# Patient Record
Sex: FEMALE | Race: BLACK OR AFRICAN AMERICAN | Employment: PART TIME | URBAN - METROPOLITAN AREA
[De-identification: names, ages, dates, MRNs, and addresses within clinical notes are randomized per-mention and may not be internally consistent; named-entity substitution may affect disease eponyms.]

---

## 2017-01-03 ENCOUNTER — OFFICE VISIT (OUTPATIENT)
Dept: OBGYN CLINIC | Age: 72
End: 2017-01-03

## 2017-01-03 VITALS
HEIGHT: 65 IN | WEIGHT: 179.4 LBS | BODY MASS INDEX: 29.89 KG/M2 | HEART RATE: 61 BPM | SYSTOLIC BLOOD PRESSURE: 103 MMHG | DIASTOLIC BLOOD PRESSURE: 59 MMHG | RESPIRATION RATE: 16 BRPM | TEMPERATURE: 98.9 F

## 2017-01-03 DIAGNOSIS — B37.2 YEAST INFECTION OF THE SKIN: ICD-10-CM

## 2017-01-03 DIAGNOSIS — N89.8 VAGINAL DISCHARGE: ICD-10-CM

## 2017-01-03 DIAGNOSIS — Z01.419 WELL WOMAN EXAM WITH ROUTINE GYNECOLOGICAL EXAM: Primary | ICD-10-CM

## 2017-01-03 RX ORDER — NYSTATIN AND TRIAMCINOLONE ACETONIDE 100000; 1 [USP'U]/G; MG/G
CREAM TOPICAL 2 TIMES DAILY
Qty: 30 G | Refills: 3 | Status: ON HOLD | OUTPATIENT
Start: 2017-01-03 | End: 2022-08-26

## 2017-01-03 RX ORDER — NYSTATIN AND TRIAMCINOLONE ACETONIDE 100000; 1 [USP'U]/G; MG/G
CREAM TOPICAL 2 TIMES DAILY
Qty: 30 G | Refills: 3 | Status: SHIPPED | OUTPATIENT
Start: 2017-01-03 | End: 2017-01-03 | Stop reason: SDUPTHER

## 2017-01-03 NOTE — PROGRESS NOTES
NEW PATIENT EXAM  Stefani/Post Menopause    SUBJECTIVE: Kaleb Shukla is a 80 y.o. female who presents for annual well woman exam. Complaints of perineal irritation and itching. Lots of scratching. No LMP recorded. Patient has had a hysterectomy. GYN History  Menopause:  49  Post menopausal bleeding:   NO  Sexually active: NO  Contraception:  none  Sexually transmitted diseases/infections: NO    Last pap: >10 years  The prior Pap result: Normal.      Past Medical History   Diagnosis Date    Arthritis 2014    CAD (coronary artery disease)     Chronic obstructive pulmonary disease ([facility] [address] 2016    Hypercholesterolemia     Hypertension      Past Surgical History   Procedure Laterality Date    Hx hysterectomy  1983     OB History     No data available        Family History   Problem Relation Age of Onset    Diabetes Mother     Lung Disease Father      Lung cancer    Diabetes Maternal Grandmother     Heart Disease Maternal Grandfather     Arthritis-rheumatoid Paternal Grandmother     Cancer Paternal Grandfather      Social History     Social History    Marital status: SINGLE     Spouse name: N/A    Number of children: N/A    Years of education: N/A     Occupational History    Not on file. Social History Main Topics    Smoking status: Current Every Day Smoker     Packs/day: 6.00     Types: Cigarettes    Smokeless tobacco: Not on file      Comment: patient states she has patches    Alcohol use No    Drug use: No    Sexual activity: No     Other Topics Concern    Not on file     Social History Narrative       Current Outpatient Prescriptions   Medication Sig Dispense Refill    nystatin-triamcinolone (MYCOLOG II) topical cream Apply  to affected area two (2) times a day. 30 g 3    magnesium oxide (MAG-OX) 400 mg tablet TAKE 1 TABLET EVERY DAY  6    traMADol (ULTRAM) 50 mg tablet Take 50 mg by mouth two (2) times daily as needed for Pain.       linaclotide (LINZESS) 145 mcg cap capsule Take  by mouth Daily (before breakfast).  cholecalciferol, vitamin D3, (VITAMIN D3) 2,000 unit tab Take  by mouth.  FOLIC ACID/MV,FE,MIN (ONE DAILY FOR WOMEN PO) Take  by mouth daily.  omeprazole (PRILOSEC) 20 mg capsule Take 20 mg by mouth daily.  cyanocobalamin (VITAMIN B-12) 100 mcg tablet Take 100 mcg by mouth daily.  atenolol (TENORMIN) 25 mg tablet Take 25 mg by mouth daily.  atorvastatin (LIPITOR) 20 mg tablet Take 1 tablet by mouth nightly. 30 tablet 12    amlodipine (NORVASC) 5 mg tablet Take 1 tablet by mouth daily. 30 tablet 12    lisinopril (PRINIVIL, ZESTRIL) 10 mg tablet Take 1 tablet by mouth daily. 30 tablet 12    nitroglycerin (NITROSTAT) 0.4 mg SL tablet 1 tablet by SubLINGual route every five (5) minutes as needed for Chest Pain. 25 tablet 12    gabapentin (NEURONTIN) 600 mg tablet Take 600 mg by mouth four (4) times daily as needed.  diclofenac EC (VOLTAREN) 75 mg EC tablet Take 75 mg by mouth two (2) times a day.  aspirin delayed-release 81 mg tablet Take  by mouth daily. Review of Systems:   Complete review of systems reviewed from social and history data forms. Pertinent positives in HPI. Objective:     Visit Vitals    /59 (BP 1 Location: Right arm, BP Patient Position: Sitting)    Pulse 61    Temp 98.9 °F (37.2 °C) (Oral)    Resp 16    Ht 5' 5\" (1.651 m)    Wt 179 lb 6.4 oz (81.4 kg)    BMI 29.85 kg/m2       General:  alert, cooperative, no distress, appears stated age   Skin:  Normal.   Lymph Nodes:  Cervical, supraclavicular, and axillary nodes normal.   Breast Exam: normal appearance, no masses or tenderness    Lungs:  clear to auscultation bilaterally   Heart:  regular rate and rhythm, S1, S2 normal, no murmur, click, rub or gallop   Abdomen: soft, non-tender.  No masses,  no organomegaly   Back:  Costovertebral angle tenderness absent   Genitourinary: BUS normal. Introitus normal. Normal appearing vaginal epithelium, Vaginal discharge described as normal and physiologic.,  Adnexa normal in size left and right without tenderness. Extremities:  extremities normal, atraumatic, no cyanosis or edema     Neurologic:  negative   Psychiatric:  non focal     ASSESSMENT:      ICD-10-CM ICD-9-CM    1. Well woman exam with routine gynecological exam Z01.419 V72.31    2. Vaginal discharge N89.8 623.5    3. Yeast infection of the skin B37.2 112.3 nystatin-triamcinolone (MYCOLOG II) topical cream      DISCONTINUED: nystatin-triamcinolone (MYCOLOG II) topical cream     Follow-up Disposition:  Return if symptoms worsen or fail to improve. Today's care was reviewed and discussed with the patient. She expresses understanding and approval of today's plan.     Reggie Gillespie MD

## 2017-01-03 NOTE — PROGRESS NOTES
Chief Complaint   Patient presents with    New Patient     Patient complains of vaginal itching with odorous brownish discharge for 4-5 months.     -Note written by Ian Pa RN

## 2017-01-03 NOTE — MR AVS SNAPSHOT
Visit Information Date & Time Provider Department Dept. Phone Encounter #  
 1/3/2017  2:30 PM Sky Núñez [phone] [id number] Upcoming Health Maintenance Date Due Hepatitis C Screening 1945 FOBT Q 1 YEAR AGE 50-75 7/23/1996 ZOSTER VACCINE AGE 60> 7/23/2006 OSTEOPOROSIS SCREENING (DEXA) 7/23/2011 BREAST CANCER SCRN MAMMOGRAM 10/11/2018 Allergies as of 1/3/2017  Review Complete On: 1/3/2017 By: Brandie Baker MD  
 No Known Allergies Current Immunizations  Never Reviewed Name Date Influenza High Dose Vaccine PF 8/22/2016 Not reviewed this visit You Were Diagnosed With   
  
 Codes Comments Vaginal discharge    -  Primary ICD-10-CM: N89.8 ICD-9-CM: 623.5 Yeast infection of the skin     ICD-10-CM: B37.2 ICD-9-CM: 112.3 Vitals BP Pulse Temp Resp Height(growth percentile) Weight(growth percentile) 103/59 (BP 1 Location: Right arm, BP Patient Position: Sitting) 61 98.9 °F (37.2 °C) (Oral) 16 5' 5\" (1.651 m) 179 lb 6.4 oz (81.4 kg) BMI OB Status Smoking Status 29.85 kg/m2 Hysterectomy Current Every Day Smoker Vitals History BMI and BSA Data Body Mass Index Body Surface Area  
 29.85 kg/m 2 1.93 m 2 Preferred Pharmacy Pharmacy Name Phone [id number] [place] [address] Your Updated Medication List  
  
   
This list is accurate as of: 1/3/17  3:35 PM.  Always use your most recent med list. amLODIPine 5 mg tablet Commonly known as:  [place] Take 1 tablet by mouth daily. aspirin delayed-release 81 mg tablet Take  by mouth daily. atenolol 25 mg tablet Commonly known as:  TENORMIN Take 25 mg by mouth daily. atorvastatin 20 mg tablet Commonly known as:  LIPITOR Take 1 tablet by mouth nightly. diclofenac EC 75 mg EC tablet Commonly known as:  VOLTAREN Take 75 mg by mouth two (2) times a day.  
  
 gabapentin 600 mg tablet Commonly known as:  NEURONTIN Take 600 mg by mouth four (4) times daily as needed. LINZESS 145 mcg Cap capsule Generic drug:  linaclotide Take  by mouth Daily (before breakfast). lisinopril 10 mg tablet Commonly known as:  [place] Take 1 tablet by mouth daily. magnesium oxide 400 mg tablet Commonly known as:  MAG-OX  
TAKE 1 TABLET EVERY DAY  
  
 nitroglycerin 0.4 mg SL tablet Commonly known as:  NITROSTAT  
1 tablet by SubLINGual route every five (5) minutes as needed for Chest Pain. nystatin-triamcinolone topical cream  
Commonly known as:  MYCOLOG II Apply  to affected area two (2) times a day. omeprazole 20 mg capsule Commonly known as:  PRILOSEC Take 20 mg by mouth daily. ONE DAILY FOR WOMEN PO Take  by mouth daily. traMADol 50 mg tablet Commonly known as:  ULTRAM  
Take 50 mg by mouth two (2) times daily as needed for Pain. VITAMIN B-12 100 mcg tablet Generic drug:  cyanocobalamin Take 100 mcg by mouth daily. VITAMIN D3 2,000 unit Tab Generic drug:  cholecalciferol (vitamin D3) Take  by mouth. Prescriptions Sent to Pharmacy Refills  
 nystatin-triamcinolone (MYCOLOG II) topical cream 3 Sig: Apply  to affected area two (2) times a day. Class: Normal  
 Pharmacy: [facility] #[id number] #: [phone] Route: Topical  
  
Patient Instructions Menopause Diet: Care Instructions Your Care Instructions Healthy eating helps ease menopause symptoms. And it can reduce your risk for getting conditions such as osteoporosis and heart disease. Follow-up care is a key part of your treatment and safety.  Be sure to make and go to all appointments, and call your doctor if you are having problems. It's also a good idea to know your test results and keep a list of the medicines you take. How can you care for yourself at home? · Limit fats in your diet. · Choose foods that have a lot of calcium. The recommended daily intake for adults ages 23 to 48 is 1,000 milligrams (mg). Adults over 50 need 1,200 mg a day. Take a calcium supplement if you don't get enough calcium in the foods you eat. · Add vitamin D to your daily diet. It helps your body use calcium. The recommended daily intake of vitamin D is 600 international units (IU) a day for children and adults up to age 79. Adults age 70 and older need 800 IU a day. Take vitamin D supplements if you need to. · Include good sources of fiber in your diet each day. These include whole grains, beans, fruits, and vegetables. · Avoid simple sugars. This helps if you have mood swings, anxiety, or depression. · Avoid caffeine, or cut back on it. Caffeine can cause sleep problems. It can also make you feel anxious. To relieve these symptoms, pay attention to how much caffeine you are getting in drinks and chocolate. · Limit your intake of alcohol. Heavy drinking tends to make symptoms worse. Where can you learn more? Go to http://edna-ilda.info/. Enter D404 in the search box to learn more about \"Menopause Diet: Care Instructions. \" Current as of: July 26, 2016 Content Version: 11.1 © 3864-1819 [employer]. Care instructions adapted under license by [employer] (which disclaims liability or warranty for this information). If you have questions about a medical condition or this instruction, always ask your healthcare professional. [address] any warranty or liability for your use of this information. Introducing [facility]!    
 Nathalia Lugo introduces [employer] patient portal. Now you can access parts of your medical record, email your doctor's office, and request medication refills online. 1. In your internet browser, go to [email or web address] 2. Click on the First Time User? Click Here link in the Sign In box. You will see the New Member Sign Up page. 3. Enter your WorkSnug Access Code exactly as it appears below. You will not need to use this code after youve completed the sign-up process. If you do not sign up before the expiration date, you must request a new code. · WorkSnug Access Code: Rose Marie Guadalupe Expires: 4/3/2017  3:35 PM 
 
4. Enter the last four digits of your Social Security Number (xxxx) and Date of Birth (mm/dd/yyyy) as indicated and click Submit. You will be taken to the next sign-up page. 5. Create a [email or web address] ID. This will be your WorkSnug login ID and cannot be changed, so think of one that is secure and easy to remember. 6. Create a WorkSnug password. You can change your password at any time. 7. Enter your Password Reset Question and Answer. This can be used at a later time if you forget your password. 8. Enter your e-mail address. You will receive e-mail notification when new information is available in [address] 9. Click Sign Up. You can now view and download portions of your medical record. 10. Click the Download Summary menu link to download a portable copy of your medical information. If you have questions, please visit the Frequently Asked Questions section of the WorkSnug website. Remember, WorkSnug is NOT to be used for urgent needs. For medical emergencies, dial 911. Now available from your iPhone and Android! Please provide this summary of care documentation to your next provider. Your primary care clinician is listed as James Mckenzie. If you have any questions after today's visit, please call [phone].

## 2017-01-03 NOTE — PATIENT INSTRUCTIONS
Menopause Diet: Care Instructions  Your Care Instructions  Healthy eating helps ease menopause symptoms. And it can reduce your risk for getting conditions such as osteoporosis and heart disease. Follow-up care is a key part of your treatment and safety. Be sure to make and go to all appointments, and call your doctor if you are having problems. It's also a good idea to know your test results and keep a list of the medicines you take. How can you care for yourself at home? · Limit fats in your diet. · Choose foods that have a lot of calcium. The recommended daily intake for adults ages 23 to 48 is 1,000 milligrams (mg). Adults over 50 need 1,200 mg a day. Take a calcium supplement if you don't get enough calcium in the foods you eat. · Add vitamin D to your daily diet. It helps your body use calcium. The recommended daily intake of vitamin D is 600 international units (IU) a day for children and adults up to age 79. Adults age 70 and older need 800 IU a day. Take vitamin D supplements if you need to. · Include good sources of fiber in your diet each day. These include whole grains, beans, fruits, and vegetables. · Avoid simple sugars. This helps if you have mood swings, anxiety, or depression. · Avoid caffeine, or cut back on it. Caffeine can cause sleep problems. It can also make you feel anxious. To relieve these symptoms, pay attention to how much caffeine you are getting in drinks and chocolate. · Limit your intake of alcohol. Heavy drinking tends to make symptoms worse. Where can you learn more? Go to http://edna-ilda.info/. Enter G445 in the search box to learn more about \"Menopause Diet: Care Instructions. \"  Current as of: July 26, 2016  Content Version: 11.1  © 0705-5909 [employer]. Care instructions adapted under license by [employer] (which disclaims liability or warranty for this information).  If you have questions about a medical condition or this instruction, always ask your healthcare professional. [place] [address] any warranty or liability for your use of this information.

## 2017-01-09 LAB
CYTOLOGIST CVX/VAG CYTO: NORMAL
CYTOLOGY CVX/VAG DOC THIN PREP: NORMAL
DX ICD CODE: NORMAL
DX ICD CODE: NORMAL
LABCORP, 190119: NORMAL
Lab: NORMAL
Lab: NORMAL
OTHER STN SPEC: NORMAL
PATH REPORT.FINAL DX SPEC: NORMAL
STAT OF ADQ CVX/VAG CYTO-IMP: NORMAL

## 2017-01-10 RX ORDER — METRONIDAZOLE 500 MG/1
2000 TABLET ORAL
Qty: 4 TAB | Refills: 0 | Status: ON HOLD | OUTPATIENT
Start: 2017-01-10 | End: 2022-08-26

## 2017-01-10 NOTE — PROGRESS NOTES
Pt given results. Pt states she hasn't had a sexual partner in 10 years, and its been a long time since she's been checked. Pt encouraged to take her medication and to make an appt to have another exam done in one year. Pt voiced understanding.

## 2017-01-27 ENCOUNTER — DOCUMENTATION ONLY (OUTPATIENT)
Dept: OBGYN CLINIC | Age: 72
End: 2017-01-27

## 2017-04-25 ENCOUNTER — OFFICE VISIT (OUTPATIENT)
Dept: CARDIOLOGY CLINIC | Age: 72
End: 2017-04-25

## 2017-04-25 VITALS
RESPIRATION RATE: 16 BRPM | BODY MASS INDEX: 30.26 KG/M2 | HEART RATE: 70 BPM | SYSTOLIC BLOOD PRESSURE: 110 MMHG | HEIGHT: 65 IN | DIASTOLIC BLOOD PRESSURE: 70 MMHG | OXYGEN SATURATION: 91 % | WEIGHT: 181.6 LBS

## 2017-04-25 DIAGNOSIS — E78.2 MIXED HYPERLIPIDEMIA: ICD-10-CM

## 2017-04-25 DIAGNOSIS — I73.9 CLAUDICATION OF BOTH LOWER EXTREMITIES (HCC): Primary | ICD-10-CM

## 2017-04-25 DIAGNOSIS — I10 ESSENTIAL HYPERTENSION: ICD-10-CM

## 2017-04-25 DIAGNOSIS — R09.89 DECREASED PULSES IN FEET: ICD-10-CM

## 2017-04-25 RX ORDER — DICLOFENAC SODIUM 10 MG/G
GEL TOPICAL 4 TIMES DAILY
COMMUNITY

## 2017-04-25 NOTE — MR AVS SNAPSHOT
Visit Information Date & Time Provider Department Dept. Phone Encounter #  
 4/25/2017  3:45 PM Deyanira Steward, [address] [facility] [phone] [id number] Follow-up Instructions Return in about 4 weeks (around 5/23/2017). Routing History Follow-up and Disposition History Upcoming Health Maintenance Date Due Hepatitis C Screening 1945 DTaP/Tdap/Td series (1 - Tdap) 7/23/1967 FOBT Q 1 YEAR AGE 50-75 7/23/1996 ZOSTER VACCINE AGE 60> 7/23/2006 GLAUCOMA SCREENING Q2Y 7/23/2011 OSTEOPOROSIS SCREENING (DEXA) 7/23/2011 Pneumococcal 65+ Low/Medium Risk (1 of 2 - PCV13) 7/23/2011 MEDICARE YEARLY EXAM 7/23/2011 BREAST CANCER SCRN MAMMOGRAM 10/11/2018 Allergies as of 4/25/2017  Review Complete On: 4/25/2017 By: Deyanira Steward MD  
 No Known Allergies Current Immunizations  Never Reviewed Name Date Influenza High Dose Vaccine PF 8/22/2016 Not reviewed this visit You Were Diagnosed With   
  
 Codes Comments Claudication of both lower extremities ([facility] [address]    -  Primary ICD-10-CM: I73.9 ICD-9-CM: 443.9 Mixed hyperlipidemia     ICD-10-CM: E78.2 ICD-9-CM: 272.2 Essential hypertension     ICD-10-CM: I10 
ICD-9-CM: 401.9 Decreased pulses in feet     ICD-10-CM: R09.89 ICD-9-CM: 546. 9 Vitals BP Pulse Resp Height(growth percentile) Weight(growth percentile) SpO2  
 110/70 (BP 1 Location: Left arm, BP Patient Position: Sitting) 70 16 5' 5\" (1.651 m) 181 lb 9.6 oz (82.4 kg) 91% BMI OB Status Smoking Status 30.22 kg/m2 Hysterectomy Current Every Day Smoker Vitals History BMI and BSA Data Body Mass Index Body Surface Area  
 30.22 kg/m 2 1.94 m 2 Preferred Pharmacy Pharmacy Name Phone [email or web address], [address] [phone] Your Updated Medication List  
  
   
 This list is accurate as of: 4/25/17  4:04 PM.  Always use your most recent med list. amLODIPine 5 mg tablet Commonly known as:  [place]phina [place] Take 1 tablet by mouth daily. aspirin delayed-release 81 mg tablet Take  by mouth daily. atenolol 25 mg tablet Commonly known as:  TENORMIN Take 25 mg by mouth daily. atorvastatin 20 mg tablet Commonly known as:  LIPITOR Take 1 tablet by mouth nightly. * diclofenac EC 75 mg EC tablet Commonly known as:  VOLTAREN Take 75 mg by mouth two (2) times a day. * VOLTAREN 1 % Gel Generic drug:  diclofenac Apply  to affected area four (4) times daily. gabapentin 600 mg tablet Commonly known as:  NEURONTIN Take 600 mg by mouth four (4) times daily as needed. LINZESS 145 mcg Cap capsule Generic drug:  linaclotide Take  by mouth Daily (before breakfast). lisinopril 10 mg tablet Commonly known as:  Jodean Embs Take 1 tablet by mouth daily. magnesium oxide 400 mg tablet Commonly known as:  MAG-OX  
TAKE 1 TABLET EVERY DAY  
  
 metroNIDAZOLE 500 mg tablet Commonly known as:  FLAGYL Take 4 Tabs by mouth once as needed for up to 1 dose. nitroglycerin 0.4 mg SL tablet Commonly known as:  NITROSTAT  
1 tablet by SubLINGual route every five (5) minutes as needed for Chest Pain. nystatin-triamcinolone topical cream  
Commonly known as:  MYCOLOG II Apply  to affected area two (2) times a day. omeprazole 20 mg capsule Commonly known as:  PRILOSEC Take 20 mg by mouth daily. ONE DAILY FOR WOMEN PO Take  by mouth daily. traMADol 50 mg tablet Commonly known as:  ULTRAM  
Take 50 mg by mouth two (2) times daily as needed for Pain. VITAMIN B-12 100 mcg tablet Generic drug:  cyanocobalamin Take 100 mcg by mouth daily. VITAMIN D3 2,000 unit Tab Generic drug:  cholecalciferol (vitamin D3) Take  by mouth. * Notice: This list has 2 medication(s) that are the same as other medications prescribed for you. Read the directions carefully, and ask your doctor or other care provider to review them with you. Follow-up Instructions Return in about 4 weeks (around 5/23/2017). To-Do List   
 04/25/2017 Imaging:  [facility]ACH[facility] INDEX Introducing [facility]! Debby Leonard introduces [employer] patient portal. Now you can access parts of your medical record, email your doctor's office, and request medication refills online. 1. In your internet browser, go to [email or web address] 2. Click on the First Time User? Click Here link in the Sign In box. You will see the New Member Sign Up page. 3. Enter your [employer] Access Code exactly as it appears below. You will not need to use this code after youve completed the sign-up process. If you do not sign up before the expiration date, you must request a new code. · [employer] Access Code: [email or web address] Expires: 7/24/2017  2:24 PM 
 
4. Enter the last four digits of your Social Security Number (xxxx) and Date of Birth (mm/dd/yyyy) as indicated and click Submit. You will be taken to the next sign-up page. 5. Create a [employer] ID. This will be your [employer] login ID and cannot be changed, so think of one that is secure and easy to remember. 6. Create a [employer] password. You can change your password at any time. 7. Enter your Password Reset Question and Answer. This can be used at a later time if you forget your password. 8. Enter your e-mail address. You will receive e-mail notification when new information is available in [address] 9. Click Sign Up. You can now view and download portions of your medical record. 10. Click the Download Summary menu link to download a portable copy of your medical information.  
 
If you have questions, please visit the Frequently Asked Questions section of the [employer]. Remember, Tailoredhart is NOT to be used for urgent needs. For medical emergencies, dial 911. Now available from your iPhone and Android! Please provide this summary of care documentation to your next provider. Your primary care clinician is listed as Sandy Mcintyre. If you have any questions after today's visit, please call [phone].

## 2017-04-25 NOTE — PROGRESS NOTES
Chief Complaint   Patient presents with    Other     vascular consult-some swelling in lf ankle, pain in both legs, some discoloration on lf thigh

## 2017-04-25 NOTE — PROGRESS NOTES
4/25/2017 3:50 PM      Subjective:     Nathan Murillo is seen in office today for further evaluation of b/l LE pain, cramps, claudication. She has these symptoms for last year, however now symptoms worse and she is having hard time walking. Symptoms consistent with [place] class 3. Visit Vitals    /70 (BP 1 Location: Left arm, BP Patient Position: Sitting)    Pulse 70    Resp 16    Ht 5' 5\" (1.651 m)    Wt 181 lb 9.6 oz (82.4 kg)    SpO2 91%    BMI 30.22 kg/m2     Current Outpatient Prescriptions   Medication Sig    diclofenac (VOLTAREN) 1 % gel Apply  to affected area four (4) times daily.  nystatin-triamcinolone (MYCOLOG II) topical cream Apply  to affected area two (2) times a day.  magnesium oxide (MAG-OX) 400 mg tablet TAKE 1 TABLET EVERY DAY    traMADol (ULTRAM) 50 mg tablet Take 50 mg by mouth two (2) times daily as needed for Pain.  linaclotide (LINZESS) 145 mcg cap capsule Take  by mouth Daily (before breakfast).  cholecalciferol, vitamin D3, (VITAMIN D3) 2,000 unit tab Take  by mouth.  FOLIC ACID/MV,FE,MIN (ONE DAILY FOR WOMEN PO) Take  by mouth daily.  omeprazole (PRILOSEC) 20 mg capsule Take 20 mg by mouth daily.  cyanocobalamin (VITAMIN B-12) 100 mcg tablet Take 100 mcg by mouth daily.  atenolol (TENORMIN) 25 mg tablet Take 25 mg by mouth daily.  atorvastatin (LIPITOR) 20 mg tablet Take 1 tablet by mouth nightly.  amlodipine (NORVASC) 5 mg tablet Take 1 tablet by mouth daily.  lisinopril (PRINIVIL, ZESTRIL) 10 mg tablet Take 1 tablet by mouth daily.  nitroglycerin (NITROSTAT) 0.4 mg SL tablet 1 tablet by SubLINGual route every five (5) minutes as needed for Chest Pain.  gabapentin (NEURONTIN) 600 mg tablet Take 600 mg by mouth four (4) times daily as needed.  diclofenac EC (VOLTAREN) 75 mg EC tablet Take 75 mg by mouth two (2) times a day.  aspirin delayed-release 81 mg tablet Take  by mouth daily.     metroNIDAZOLE (FLAGYL) 500 mg tablet Take 4 Tabs by mouth once as needed for up to 1 dose. No current facility-administered medications for this visit. Objective:      Visit Vitals    /70 (BP 1 Location: Left arm, BP Patient Position: Sitting)    Pulse 70    Resp 16    Ht 5' 5\" (1.651 m)    Wt 181 lb 9.6 oz (82.4 kg)    SpO2 91%    BMI 30.22 kg/m2       Data Review:     Reviewed and/or ordered active problem list, medication list tests    Past Medical History:   Diagnosis Date    Arthritis 2014    CAD (coronary artery disease)     Chronic obstructive pulmonary disease ([facility] [address] 2016    Hypercholesterolemia     Hypertension       Past Surgical History:   Procedure Laterality Date    HX HYSTERECTOMY  1983     No Known Allergies   Family History   Problem Relation Age of Onset    Diabetes Mother     Lung Disease Father      Lung cancer    Diabetes Maternal Grandmother     Heart Disease Maternal Grandfather     Arthritis-rheumatoid Paternal Grandmother     Cancer Paternal Grandfather       Social History     Social History    Marital status: SINGLE     Spouse name: N/A    Number of children: N/A    Years of education: N/A     Occupational History    Not on file. Social History Main Topics    Smoking status: Current Every Day Smoker     Packs/day: 6.00     Types: Cigarettes    Smokeless tobacco: Never Used      Comment: patient states she has patches    Alcohol use No    Drug use: No    Sexual activity: No     Other Topics Concern    Not on file     Social History Narrative       Review of Systems     General: Not Present- Anorexia, Chills, Dietary Changes, Fatigue, Fever, Medication Changes, Night Sweats, Weight Gain > 10lbs. and Weight Loss > 10lbs. .  Skin: Not Present- Bruising and Excessive Sweating. HEENT: Not Present- Headache, Visual Loss and Vertigo.   Respiratory: Not Present- Cough, Decreased Exercise Tolerance, Difficulty Breathing, Snoring and Wheezing. Cardiovascular: Not Present- Abnormal Blood Pressure, Chest Pain, Difficulty Breathing On Exertion, Edema, Fainting / Blacking Out, Irregular Heart Beat, Orthopnea, Palpitations, Paroxysmal Nocturnal Dyspnea, Rapid Heart Rate, Shortness of Breath and Swelling of Extremities. Gastrointestinal: Not Present- Black, Tarry Stool, Bloody Stool, Diarrhea, Hematemesis, Rectal Bleeding and Vomiting. Musculoskeletal: Not Present- Muscle Pain and Muscle Weakness. Neurological: Not Present- Dizziness. Psychiatric: Not Present- Depression. Endocrine: Not Present- Cold Intolerance, Heat Intolerance and Thyroid Problems. Hematology: Not Present- Abnormal Bleeding, Anemia, Blood Clots and Easy Bruising.       Physical Exam   The physical exam findings are as follows:       General   Mental Status - Alert. General Appearance - Cooperative and Well groomed. Not in acute distress. Orientation - Oriented to time, Oriented to place and Oriented to person. Build & Nutrition - Well developed. HEENT  Head - Normal.  Eye - Normal.      Neck   Carotid Arteries - normal upstroke. No Bruits. Chest and Lung Exam   Inspection:   Chest Wall: - Normal. Accessory muscles - No use of accessory muscles in breathing. Auscultation:   Breath sounds: - Normal.      Cardiovascular   Inspection: Jugular vein - Bilateral - Inspection Normal.  Palpation/Percussion:   Apical Impulse: - Normal.  Auscultation: Rhythm - Regular. Heart Sounds - S1 WNL and S2 WNL. No S3 or S4. Murmurs & Other Heart Sounds: Auscultation of the heart reveals - No Murmurs. Abdomen   Palpation/Percussion: Palpation and Percussion of the abdomen reveal - No Palpable abdominal masses. Auscultation: Auscultation of the abdomen reveals - Bowel sounds normal.      Peripheral Vascular   Upper Extremity: Inspection - Bilateral - No Cyanotic nailbeds or Digital clubbing.   Palpation: Radial pulse - Bilateral - Normal.  Lower Extremity:   Palpation: Femoral pulse - Bilateral - Normal. Pop b/l weak. Dorsalis pedis pulse - Bilateral - weak. Posterior tibia pulse - Bilateral - weak. Edema - Bilateral - No edema. No evidence of varicose veins, spider veins or telangiectasias. Auscultation - No Bilateral Groin Bruits. Neurologic   Mental Status: Affect - normal.  Motor: - Normal. Gait - Normal.        Assessment:       ICD-10-CM ICD-9-CM    1. Claudication of both lower extremities (HCC) I73.9 443. 9 ANKLE BRACHIAL INDEX   2. Mixed hyperlipidemia E78.2 272.2    3. Essential hypertension I10 401.9    4. Decreased pulses in feet R09.89 785. 9 ANKLE BRACHIAL INDEX       Plan:     1. Assess for PAF with DEVAUGHN. Further recommendation based upon DEVAUGHN results. 2. HTN: BP controlled. Continue current meds. 3. On statin. 4. Smoking cessation d/w pt.

## 2017-05-08 ENCOUNTER — OFFICE VISIT (OUTPATIENT)
Dept: CARDIOLOGY CLINIC | Age: 72
End: 2017-05-08

## 2017-05-08 DIAGNOSIS — M79.605 PAIN IN BOTH LOWER EXTREMITIES: ICD-10-CM

## 2017-05-08 DIAGNOSIS — R09.89 DECREASED PULSES IN FEET: Primary | ICD-10-CM

## 2017-05-08 DIAGNOSIS — M79.604 PAIN IN BOTH LOWER EXTREMITIES: ICD-10-CM

## 2017-05-09 ENCOUNTER — TELEPHONE (OUTPATIENT)
Dept: CARDIOLOGY CLINIC | Age: 72
End: 2017-05-09

## 2017-05-09 NOTE — TELEPHONE ENCOUNTER
.Verified patient with two identifiers. Pt informed of DEVAUGHN study results. Arterial doppler scheduled per Dr Maged Oliveira.

## 2017-05-17 ENCOUNTER — CLINICAL SUPPORT (OUTPATIENT)
Dept: CARDIOLOGY CLINIC | Age: 72
End: 2017-05-17

## 2017-05-17 DIAGNOSIS — R68.89 ABNORMAL ANKLE BRACHIAL INDEX (ABI): ICD-10-CM

## 2017-05-17 DIAGNOSIS — I73.9 CLAUDICATION (HCC): ICD-10-CM

## 2017-05-17 DIAGNOSIS — R09.89 DECREASED PULSES IN FEET: Primary | ICD-10-CM

## 2017-05-17 NOTE — PROCEDURES
[facility]  *** FINAL REPORT ***    Name: Dixie Grove  MRN: [id number]       Outpatient  : 1945  HIS Order #: [id number]  [address] [facility] Visit #: [id number]  Date: 17 May 2017    TYPE OF TEST: Extremity Arterial Duplex    REASON FOR TEST  Claudication (both sides), Rest pain (both sides)                            Right                     Left  Artery               PSV   Finding             PSV   Finding  ------------------  -----  ---------------    -----  ---------------  External iliac:  Common femoral:     111.0                      92.0  Mild stenosis  Profunda femoris:   108.0                     144.0  Mild stenosis  Proximal SFA:       138.0                     177.0  Mid SFA:             96.0  >50% stenosis      100.0  Mild stenosis  Distal SFA:          89.0                      73.0  Popliteal:           87.0  Mild stenosis       73.0  Mild stenosis  Anterior tibial:     74.0                      91.0  Mild stenosis  Posterior tibial:    90.0                      94.0  Mild stenosis    Pressures               Right     Left               -----     -----     Brachial:           DP:           PT:            DEVAUGHN:            Toe: INTERPRETATION/FINDINGS  Right leg :  1. <50% stenosis of the popliteal (ak) artery. 2. >50% stenosis of the superficial femoral artery. 3. A monophasic signal is demonstrated in the distal superficial  femoral, anterior tibial, posterior tibial and peroneal arteries. Left leg :  1. <50% stenosis of the common femoral, profunda femoris, superficial  femoral, popliteal (ak), anterior tibial and posterior tibial  arteries. 2. Unable to visualize the peroneal artery. 3. A monophasic signal is demonstrated in the anterior tibial and  posterior tibial arteries. ADDITIONAL COMMENTS    I have personally reviewed the data relevant to the interpretation of  this  study.     TECHNOLOGIST: Lesley Harrington RVT  Signed: 2017 02:33 PM    PHYSICIAN: Jeff Noriega.  Ryanne Bloom MD  Signed: 05/18/2017 05:48 PM

## 2017-05-23 ENCOUNTER — OFFICE VISIT (OUTPATIENT)
Dept: CARDIOLOGY CLINIC | Age: 72
End: 2017-05-23

## 2017-05-23 VITALS
RESPIRATION RATE: 16 BRPM | HEART RATE: 57 BPM | BODY MASS INDEX: 29.84 KG/M2 | DIASTOLIC BLOOD PRESSURE: 60 MMHG | HEIGHT: 65 IN | WEIGHT: 179.1 LBS | SYSTOLIC BLOOD PRESSURE: 102 MMHG | OXYGEN SATURATION: 94 %

## 2017-05-23 DIAGNOSIS — E78.2 MIXED HYPERLIPIDEMIA: ICD-10-CM

## 2017-05-23 DIAGNOSIS — I25.10 CORONARY ARTERY DISEASE INVOLVING NATIVE CORONARY ARTERY OF NATIVE HEART WITHOUT ANGINA PECTORIS: ICD-10-CM

## 2017-05-23 DIAGNOSIS — I73.9 CLAUDICATION (HCC): ICD-10-CM

## 2017-05-23 DIAGNOSIS — I73.9 PAD (PERIPHERAL ARTERY DISEASE) (HCC): Primary | ICD-10-CM

## 2017-05-23 DIAGNOSIS — I10 ESSENTIAL HYPERTENSION: ICD-10-CM

## 2017-05-23 NOTE — PROGRESS NOTES
5/23/2017 1:48 PM      Subjective:     Marshall Tracey continues to c/o b/l LE claudication. L >> R. Limiting symptoms. DEVAUGHN and LE arterial doppler noted. Visit Vitals    /60 (BP 1 Location: Right arm, BP Patient Position: Sitting)    Pulse (!) 57    Resp 16    Ht 5' 5\" (1.651 m)    Wt 179 lb 1.6 oz (81.2 kg)    SpO2 94%    BMI 29.8 kg/m2     Current Outpatient Prescriptions   Medication Sig    diclofenac (VOLTAREN) 1 % gel Apply  to affected area four (4) times daily.  nystatin-triamcinolone (MYCOLOG II) topical cream Apply  to affected area two (2) times a day.  magnesium oxide (MAG-OX) 400 mg tablet TAKE 1 TABLET EVERY DAY    traMADol (ULTRAM) 50 mg tablet Take 50 mg by mouth two (2) times daily as needed for Pain.  cholecalciferol, vitamin D3, (VITAMIN D3) 2,000 unit tab Take  by mouth.  FOLIC ACID/MV,FE,MIN (ONE DAILY FOR WOMEN PO) Take  by mouth daily.  omeprazole (PRILOSEC) 20 mg capsule Take 20 mg by mouth daily.  cyanocobalamin (VITAMIN B-12) 100 mcg tablet Take 100 mcg by mouth daily.  atenolol (TENORMIN) 25 mg tablet Take 25 mg by mouth daily.  atorvastatin (LIPITOR) 20 mg tablet Take 1 tablet by mouth nightly.  amlodipine (NORVASC) 5 mg tablet Take 1 tablet by mouth daily.  lisinopril (PRINIVIL, ZESTRIL) 10 mg tablet Take 1 tablet by mouth daily.  nitroglycerin (NITROSTAT) 0.4 mg SL tablet 1 tablet by SubLINGual route every five (5) minutes as needed for Chest Pain.  gabapentin (NEURONTIN) 600 mg tablet Take 600 mg by mouth four (4) times daily as needed.  diclofenac EC (VOLTAREN) 75 mg EC tablet Take 75 mg by mouth two (2) times a day.  aspirin delayed-release 81 mg tablet Take  by mouth daily.  metroNIDAZOLE (FLAGYL) 500 mg tablet Take 4 Tabs by mouth once as needed for up to 1 dose.  linaclotide (LINZESS) 145 mcg cap capsule Take  by mouth Daily (before breakfast).      No current facility-administered medications for this visit. Objective:      Visit Vitals    /60 (BP 1 Location: Right arm, BP Patient Position: Sitting)    Pulse (!) 57    Resp 16    Ht 5' 5\" (1.651 m)    Wt 179 lb 1.6 oz (81.2 kg)    SpO2 94%    BMI 29.8 kg/m2       Data Review:     Reviewed and/or ordered active problem list, medication list tests    Past Medical History:   Diagnosis Date    Arthritis 2014    CAD (coronary artery disease)     Chronic obstructive pulmonary disease ([facility] [address] 2016    Hypercholesterolemia     Hypertension       Past Surgical History:   Procedure Laterality Date    HX HYSTERECTOMY  1983     No Known Allergies   Family History   Problem Relation Age of Onset    Diabetes Mother     Lung Disease Father      Lung cancer    Diabetes Maternal Grandmother     Heart Disease Maternal Grandfather     Arthritis-rheumatoid Paternal Grandmother     Cancer Paternal Grandfather       Social History     Social History    Marital status: SINGLE     Spouse name: N/A    Number of children: N/A    Years of education: N/A     Occupational History    Not on file. Social History Main Topics    Smoking status: Current Every Day Smoker     Packs/day: 6.00     Types: Cigarettes    Smokeless tobacco: Never Used      Comment: patient states she has patches    Alcohol use No    Drug use: No    Sexual activity: No     Other Topics Concern    Not on file     Social History Narrative         Review of Systems     General: Not Present- Anorexia, Chills, Dietary Changes, Fatigue, Fever, Medication Changes, Night Sweats, Weight Gain > 10lbs. and Weight Loss > 10lbs. .  Skin: Not Present- Bruising and Excessive Sweating. HEENT: Not Present- Headache, Visual Loss and Vertigo. Respiratory: Not Present- Cough, Decreased Exercise Tolerance, Difficulty Breathing, Snoring and Wheezing.   Cardiovascular: Not Present- Abnormal Blood Pressure, Chest Pain, Difficulty Breathing On Exertion, Edema, Fainting / Blacking Out, Irregular Heart Beat, Orthopnea, Palpitations, Paroxysmal Nocturnal Dyspnea, Rapid Heart Rate, Shortness of Breath and Swelling of Extremities. Gastrointestinal: Not Present- Black, Tarry Stool, Bloody Stool, Diarrhea, Hematemesis, Rectal Bleeding and Vomiting. Musculoskeletal: Not Present- Muscle Pain and Muscle Weakness. Neurological: Not Present- Dizziness. Psychiatric: Not Present- Depression. Endocrine: Not Present- Cold Intolerance, Heat Intolerance and Thyroid Problems. Hematology: Not Present- Abnormal Bleeding, Anemia, Blood Clots and Easy Bruising.       Physical Exam   The physical exam findings are as follows:       General   Mental Status - Alert. General Appearance - Not in acute distress. Chest and Lung Exam   Inspection: Accessory muscles - No use of accessory muscles in breathing. Auscultation:   Breath sounds: - Normal.      Cardiovascular   Inspection: Jugular vein - Bilateral - Inspection Normal.  Palpation/Percussion:   Apical Impulse: - Normal.  Auscultation: Rhythm - Regular. Heart Sounds - S1 WNL and S2 WNL. No S3 or S4. Murmurs & Other Heart Sounds: Auscultation of the heart reveals - No Murmurs. Carotid arteries - No Carotid bruit. Peripheral Vascular   Upper Extremity: Inspection - Bilateral - No Cyanotic nailbeds or Digital clubbing. Lower Extremity:   Palpation: Dorsalis pedis pulse - Bilateral - NP. Posterior tibia pulse - Bilateral - NP. Edema - Bilateral - No edema. Assessment:       ICD-10-CM ICD-9-CM    1. PAD (peripheral artery disease) ([facility]) I73.9 443.9 IR ANGIO EXT LOWER BI      CBC W/O DIFF      METABOLIC PANEL, COMPREHENSIVE      PROTHROMBIN TIME + INR   2. Claudication ([facility]) I73.9 443.9 IR ANGIO EXT LOWER BI      CBC W/O DIFF      METABOLIC PANEL, COMPREHENSIVE      PROTHROMBIN TIME + INR   3.  Coronary artery disease involving native coronary artery of native heart without angina pectoris I25.10 414.01 IR ANGIO EXT LOWER BI      CBC W/O DIFF METABOLIC PANEL, COMPREHENSIVE      PROTHROMBIN TIME + INR   4. Essential hypertension I10 401.9 IR ANGIO EXT LOWER BI      CBC W/O DIFF      METABOLIC PANEL, COMPREHENSIVE      PROTHROMBIN TIME + INR   5. Mixed hyperlipidemia E78.2 272.2 IR ANGIO EXT LOWER BI      CBC W/O DIFF      METABOLIC PANEL, COMPREHENSIVE      PROTHROMBIN TIME + INR       Plan:     1. PAD: [place] class 3 limiting symptoms. Recommend LE angio. I discussed the risks/benefits/alternatives of the procedure with the patient. Risks include (but are not limited to) bleeding, infection, death. The patient understands and agrees to proceed. 2. HTN: BP controlled. Continue current meds. 3. On statin. 4. Smoking cessation d/w pt.

## 2017-05-24 ENCOUNTER — HOSPITAL ENCOUNTER (OUTPATIENT)
Dept: LAB | Age: 72
Discharge: HOME | End: 2017-05-24
Payer: MEDICARE

## 2017-05-24 PROCEDURE — 85610 PROTHROMBIN TIME: CPT

## 2017-05-24 PROCEDURE — 85027 COMPLETE CBC AUTOMATED: CPT

## 2017-05-24 PROCEDURE — 80053 COMPREHEN METABOLIC PANEL: CPT

## 2017-05-24 PROCEDURE — 36415 COLL VENOUS BLD VENIPUNCTURE: CPT

## 2017-05-25 LAB
ALBUMIN SERPL-MCNC: 4.1 G/DL (ref 3.5–4.8)
ALBUMIN/GLOB SERPL: 1.5 {RATIO} (ref 1.2–2.2)
ALP SERPL-CCNC: 100 IU/L (ref 39–117)
ALT SERPL-CCNC: 26 IU/L (ref 0–32)
AST SERPL-CCNC: 22 IU/L (ref 0–40)
BILIRUB SERPL-MCNC: 0.2 MG/DL (ref 0–1.2)
BUN SERPL-MCNC: 13 MG/DL (ref 8–27)
BUN/CREAT SERPL: 17 (ref 12–28)
CALCIUM SERPL-MCNC: 9.2 MG/DL (ref 8.7–10.3)
CHLORIDE SERPL-SCNC: 101 MMOL/L (ref 96–106)
CO2 SERPL-SCNC: 26 MMOL/L (ref 18–29)
CREAT SERPL-MCNC: 0.78 MG/DL (ref 0.57–1)
ERYTHROCYTE [DISTWIDTH] IN BLOOD BY AUTOMATED COUNT: 14.6 % (ref 12.3–15.4)
GLOBULIN SER CALC-MCNC: 2.7 G/DL (ref 1.5–4.5)
GLUCOSE SERPL-MCNC: 92 MG/DL (ref 65–99)
HCT VFR BLD AUTO: 44.6 % (ref 34–46.6)
HGB BLD-MCNC: 14.7 G/DL (ref 11.1–15.9)
INR PPP: 1 (ref 0.8–1.2)
MCH RBC QN AUTO: 30.4 PG (ref 26.6–33)
MCHC RBC AUTO-ENTMCNC: 33 G/DL (ref 31.5–35.7)
MCV RBC AUTO: 92 FL (ref 79–97)
PLATELET # BLD AUTO: 218 X10E3/UL (ref 150–379)
POTASSIUM SERPL-SCNC: 4.7 MMOL/L (ref 3.5–5.2)
PROT SERPL-MCNC: 6.8 G/DL (ref 6–8.5)
PROTHROMBIN TIME: 9.9 SEC (ref 9.1–12)
RBC # BLD AUTO: 4.83 X10E6/UL (ref 3.77–5.28)
SODIUM SERPL-SCNC: 144 MMOL/L (ref 134–144)
WBC # BLD AUTO: 5.5 X10E3/UL (ref 3.4–10.8)

## 2017-05-26 ENCOUNTER — HOSPITAL ENCOUNTER (OUTPATIENT)
Dept: CARDIAC CATH/INVASIVE PROCEDURES | Age: 72
Discharge: HOME | End: 2017-05-26
Attending: INTERNAL MEDICINE | Admitting: INTERNAL MEDICINE
Payer: MEDICARE

## 2017-05-26 VITALS
HEIGHT: 66 IN | BODY MASS INDEX: 28.12 KG/M2 | RESPIRATION RATE: 15 BRPM | WEIGHT: 175 LBS | OXYGEN SATURATION: 98 % | SYSTOLIC BLOOD PRESSURE: 124 MMHG | DIASTOLIC BLOOD PRESSURE: 66 MMHG | HEART RATE: 65 BPM | TEMPERATURE: 97.7 F

## 2017-05-26 PROCEDURE — 74011250636 HC RX REV CODE- 250/636

## 2017-05-26 PROCEDURE — C1894 INTRO/SHEATH, NON-LASER: HCPCS

## 2017-05-26 PROCEDURE — 77030029065 HC DRSG HEMO QCLOT ZMED -B

## 2017-05-26 PROCEDURE — C1887 CATHETER, GUIDING: HCPCS

## 2017-05-26 PROCEDURE — 74011250636 HC RX REV CODE- 250/636: Performed by: INTERNAL MEDICINE

## 2017-05-26 PROCEDURE — 74011000250 HC RX REV CODE- 250

## 2017-05-26 PROCEDURE — 77030021533 HC CATH ANGI DX PRFMA MRTM -A

## 2017-05-26 PROCEDURE — 76937 US GUIDE VASCULAR ACCESS: CPT

## 2017-05-26 PROCEDURE — C1769 GUIDE WIRE: HCPCS

## 2017-05-26 PROCEDURE — 77030010852 HC CATH NB ADVNTG COOK -B

## 2017-05-26 PROCEDURE — 74011636320 HC RX REV CODE- 636/320

## 2017-05-26 RX ORDER — HEPARIN SODIUM 1000 [USP'U]/ML
1000-10000 INJECTION, SOLUTION INTRAVENOUS; SUBCUTANEOUS
Status: DISCONTINUED | OUTPATIENT
Start: 2017-05-26 | End: 2017-05-26

## 2017-05-26 RX ORDER — LIDOCAINE HYDROCHLORIDE 10 MG/ML
INJECTION INFILTRATION; PERINEURAL
Status: COMPLETED
Start: 2017-05-26 | End: 2017-05-26

## 2017-05-26 RX ORDER — IODIXANOL 320 MG/ML
INJECTION, SOLUTION INTRAVASCULAR
Status: COMPLETED
Start: 2017-05-26 | End: 2017-05-26

## 2017-05-26 RX ORDER — MIDAZOLAM HYDROCHLORIDE 1 MG/ML
INJECTION, SOLUTION INTRAMUSCULAR; INTRAVENOUS
Status: COMPLETED
Start: 2017-05-26 | End: 2017-05-26

## 2017-05-26 RX ORDER — IODIXANOL 320 MG/ML
0-100 INJECTION, SOLUTION INTRAVASCULAR
Status: DISCONTINUED | OUTPATIENT
Start: 2017-05-26 | End: 2017-05-26

## 2017-05-26 RX ORDER — SODIUM CHLORIDE 9 MG/ML
75 INJECTION, SOLUTION INTRAVENOUS CONTINUOUS
Status: DISCONTINUED | OUTPATIENT
Start: 2017-05-26 | End: 2017-05-26 | Stop reason: HOSPADM

## 2017-05-26 RX ORDER — FENTANYL CITRATE 50 UG/ML
25-50 INJECTION, SOLUTION INTRAMUSCULAR; INTRAVENOUS
Status: DISCONTINUED | OUTPATIENT
Start: 2017-05-26 | End: 2017-05-26

## 2017-05-26 RX ORDER — MIDAZOLAM HYDROCHLORIDE 1 MG/ML
.5-2 INJECTION, SOLUTION INTRAMUSCULAR; INTRAVENOUS
Status: DISCONTINUED | OUTPATIENT
Start: 2017-05-26 | End: 2017-05-26

## 2017-05-26 RX ORDER — HEPARIN SODIUM 200 [USP'U]/100ML
500 INJECTION, SOLUTION INTRAVENOUS ONCE
Status: COMPLETED | OUTPATIENT
Start: 2017-05-26 | End: 2017-05-26

## 2017-05-26 RX ORDER — HEPARIN SODIUM 1000 [USP'U]/ML
INJECTION, SOLUTION INTRAVENOUS; SUBCUTANEOUS
Status: DISCONTINUED
Start: 2017-05-26 | End: 2017-05-26 | Stop reason: HOSPADM

## 2017-05-26 RX ORDER — FENTANYL CITRATE 50 UG/ML
INJECTION, SOLUTION INTRAMUSCULAR; INTRAVENOUS
Status: COMPLETED
Start: 2017-05-26 | End: 2017-05-26

## 2017-05-26 RX ORDER — HEPARIN SODIUM 200 [USP'U]/100ML
INJECTION, SOLUTION INTRAVENOUS
Status: COMPLETED
Start: 2017-05-26 | End: 2017-05-26

## 2017-05-26 RX ORDER — LIDOCAINE HYDROCHLORIDE 10 MG/ML
1-20 INJECTION INFILTRATION; PERINEURAL ONCE
Status: COMPLETED | OUTPATIENT
Start: 2017-05-26 | End: 2017-05-26

## 2017-05-26 RX ADMIN — LIDOCAINE HYDROCHLORIDE 12 ML: 10 INJECTION, SOLUTION INFILTRATION; PERINEURAL at 09:26

## 2017-05-26 RX ADMIN — IODIXANOL 24 ML: 320 INJECTION, SOLUTION INTRAVASCULAR at 09:41

## 2017-05-26 RX ADMIN — MIDAZOLAM HYDROCHLORIDE 2 MG: 1 INJECTION INTRAMUSCULAR; INTRAVENOUS at 09:13

## 2017-05-26 RX ADMIN — FENTANYL CITRATE 50 MCG: 50 INJECTION, SOLUTION INTRAMUSCULAR; INTRAVENOUS at 09:13

## 2017-05-26 RX ADMIN — HEPARIN SODIUM 1000 UNITS: 200 INJECTION, SOLUTION INTRAVENOUS at 09:09

## 2017-05-26 RX ADMIN — LIDOCAINE HYDROCHLORIDE 12 ML: 10 INJECTION INFILTRATION; PERINEURAL at 09:26

## 2017-05-26 RX ADMIN — IODIXANOL 80 ML: 320 INJECTION, SOLUTION INTRAVASCULAR at 09:45

## 2017-05-26 RX ADMIN — MIDAZOLAM HYDROCHLORIDE 2 MG: 1 INJECTION, SOLUTION INTRAMUSCULAR; INTRAVENOUS at 09:13

## 2017-05-26 RX ADMIN — FENTANYL CITRATE 50 MCG: 50 INJECTION, SOLUTION INTRAMUSCULAR; INTRAVENOUS at 09:25

## 2017-05-26 NOTE — PROGRESS NOTES
Discharge instructions reviewed with patient. Patient transported to front entrance via wheelchair. Pt transferred safely to vehicle.

## 2017-05-26 NOTE — PROGRESS NOTES
TRANSFER - IN REPORT:    Verbal report received from LUCITA Yun(name) on [address] [facility] [place]  being received from [facility](unit) for routine progression of care      Report consisted of patients Situation, Background, Assessment and   Recommendations(SBAR). Information from the following report(s) Procedure Summary and MAR was reviewed with the receiving nurse. Opportunity for questions and clarification was provided. Assessment completed upon patients arrival to unit and care assumed.

## 2017-05-26 NOTE — PROGRESS NOTES
SHEATH PULL NOTE:    Patient informed of procedure with questions answered with review. Sheath site prepped with Chloraprep swab. 5 fr sheath in rfa pulled by JULIETTE Simeon RN. Hand hold and quick clot, with manual compression to site. No bleeding, no hematoma, no pain at site. Hemostasis obtained with hand hold/manual compression at site. Patient tolerated well. No change in status. Handhold for 15 minutes. No change at site. Occlusive dressing applied to site. No bleeding, no hematoma, no pain/discomfort at site. Groin instructions provided with review. Continue to monitor procedure site and patient status. *Advised patient to keep head flat and extremity flat to decrease risk of bleeding. *Recommended that patient not drink for ONE HOUR post sheath pull completion. *Recommended that patient not eat for TWO HOURS post sheath pull completion. *Instructed patient on rationale for delay of PO products to decrease risk for aspiration and if additional treatment to procedure site is required. Patient verbalized understanding of instructions with review.

## 2017-05-26 NOTE — IP AVS SNAPSHOT
Current Discharge Medication List  
  
CONTINUE these medications which have NOT CHANGED Dose & Instructions Dispensing Information Comments Morning Noon Evening Bedtime  
 amLODIPine 5 mg tablet Commonly known as:  Zane Radha Your last dose was: Your next dose is:    
   
   
 Dose:  5 mg Take 1 tablet by mouth daily. Quantity:  30 tablet Refills:  12  
     
   
   
   
  
 aspirin delayed-release 81 mg tablet Your last dose was: Your next dose is: Take  by mouth daily. Refills:  0  
     
   
   
   
  
 atenolol 25 mg tablet Commonly known as:  TENORMIN Your last dose was: Your next dose is:    
   
   
 Dose:  25 mg Take 25 mg by mouth daily. Refills:  0  
     
   
   
   
  
 atorvastatin 20 mg tablet Commonly known as:  LIPITOR Your last dose was: Your next dose is:    
   
   
 Dose:  20 mg Take 1 tablet by mouth nightly. Quantity:  30 tablet Refills:  12  
     
   
   
   
  
 * diclofenac EC 75 mg EC tablet Commonly known as:  VOLTAREN Your last dose was: Your next dose is:    
   
   
 Dose:  75 mg Take 75 mg by mouth two (2) times a day. Refills:  0  
     
   
   
   
  
 * VOLTAREN 1 % Gel Generic drug:  diclofenac Your last dose was: Your next dose is:    
   
   
 Apply  to affected area four (4) times daily. Refills:  0  
     
   
   
   
  
 gabapentin 600 mg tablet Commonly known as:  NEURONTIN Your last dose was: Your next dose is:    
   
   
 Dose:  600 mg Take 600 mg by mouth four (4) times daily as needed. Refills:  0 LINZESS 145 mcg Cap capsule Generic drug:  linaclotide Your last dose was: Your next dose is: Take  by mouth Daily (before breakfast). Refills:  0  
     
   
   
   
  
 lisinopril 10 mg tablet Commonly known as:  Randle Dance  
   
 Your last dose was: Your next dose is:    
   
   
 Dose:  10 mg Take 1 tablet by mouth daily. Quantity:  30 tablet Refills:  12  
     
   
   
   
  
 magnesium oxide 400 mg tablet Commonly known as:  MAG-OX Your last dose was: Your next dose is: TAKE 1 TABLET EVERY DAY Refills:  6  
     
   
   
   
  
 metroNIDAZOLE 500 mg tablet Commonly known as:  FLAGYL Your last dose was: Your next dose is:    
   
   
 Dose:  2000 mg Take 4 Tabs by mouth once as needed for up to 1 dose. Quantity:  4 Tab Refills:  0  
     
   
   
   
  
 nitroglycerin 0.4 mg SL tablet Commonly known as:  NITROSTAT Your last dose was: Your next dose is:    
   
   
 Dose:  0.4 mg  
1 tablet by SubLINGual route every five (5) minutes as needed for Chest Pain. Quantity:  25 tablet Refills:  12  
     
   
   
   
  
 nystatin-triamcinolone topical cream  
Commonly known as:  MYCOLOG II Your last dose was: Your next dose is:    
   
   
 Apply  to affected area two (2) times a day. Quantity:  30 g Refills:  3  
     
   
   
   
  
 omeprazole 20 mg capsule Commonly known as:  PRILOSEC Your last dose was: Your next dose is:    
   
   
 Dose:  20 mg Take 20 mg by mouth daily. Refills:  0  
     
   
   
   
  
 ONE DAILY FOR WOMEN PO Your last dose was: Your next dose is: Take  by mouth daily. Refills:  0  
     
   
   
   
  
 traMADol 50 mg tablet Commonly known as:  ULTRAM  
   
Your last dose was: Your next dose is:    
   
   
 Dose:  50 mg Take 50 mg by mouth two (2) times daily as needed for Pain. Refills:  0  
     
   
   
   
  
 VITAMIN B-12 100 mcg tablet Generic drug:  cyanocobalamin Your last dose was: Your next dose is:    
   
   
 Dose:  100 mcg Take 100 mcg by mouth daily. Refills:  0 VITAMIN D3 2,000 unit Tab Generic drug:  cholecalciferol (vitamin D3) Your last dose was: Your next dose is: Take  by mouth. Refills:  0  
     
   
   
   
  
 * Notice: This list has 2 medication(s) that are the same as other medications prescribed for you. Read the directions carefully, and ask your doctor or other care provider to review them with you.

## 2017-05-26 NOTE — DISCHARGE INSTRUCTIONS
[address] [place] [address]  [phone]      CARDIOLOGY DISCHARGE INSTRUCTIONS      Patient ID:  Bozena Augustin  [id number]  40 y.o.  1945    Admit Date: 5/26/2017    Discharge Date: 5/26/2017     Admitting Physician: Stephanie Hawkins MD     Discharge Physician: Stephanie Hawkins MD    Admission Diagnoses:   ABN TEST    Discharge Diagnoses: Active Problems:    * No active hospital problems. *      Discharge Condition: Good    Cardiology Procedures this Admission:  LE angio    Disposition: home    Reference discharge instructions provided by nursing for diet and activity. Signed: Stephanie Hawkins MD  5/26/2017  9:51 AM  PERIPHERAL CATHETERIZATION/PCI DISCHARGE INSTRUCTIONS    It is normal to feel tired the first couple days. Take it easy and follow the physicians instructions. CHECK THE CATHETER INSERTION SITE DAILY:    You may shower 24 hours after the procedure, remove the bandage during showering. Wash with soap and water and pat dry. Gentle cleaning of the site with soap and water is sufficient, cover with a dry clean dressing or bandage. Do not apply creams or powders to the area. Do not sit in a bathtub or pool of water for 7 days or until wound has completely healed. Temporary bruising and discomfort is normal and may last a few weeks. You may have a  formation of a small lump at the site which may last up to 6 weeks. CALL THE PHYSICIANS:    If the site becomes red, swollen or feels warm to the touch  If there is bleeding or drainage or if there is unusual pain at the groin or down the leg. If there is any bleeding, lie down, apply pressure or have someone apply pressure with a clean cloth until the bleeding stops. If the bleeding continues, call 911 to be transported to the hospital.  DO NOT DRIVE YOURSELF, [facility]     ACTIVITY:    For the first 24-48 hours or as instructed by the physician:  No lifting, pushing or pulling over 10 pounds and no straining the insertion site. Do not life grocery bags or the garbage can, do not run the vacuum  or  for 7 days. Start with short walks as in the hospital and gradually increase as tolerated each day. It is recommended to walk 30 minutes 5-7 days per week. Follow your physicians instructions on activity. Avoid walking outside in extremes of heat or cold. Walk inside when it is cold and windy or hot and humid. Things to keep in mind:  No driving for at least 24 hours, or as designated by your physician. Limit the number of times you go up and down the stairs  Take rests and pace yourself with activity. Be careful and do not strain with bowel movements. MEDICATIONS:    Take all medications as prescribed  Call your physician if you have any questions  Keep an updated list of your medications with you at all times and give a list to your physician and pharmacist      AFTER CARE:    Follow up with your physician as instructed. Follow a heart healthy diet with proper portion control, daily stress management, daily exercise, blood pressure and cholesterol control , and smoking cessation.

## 2017-05-26 NOTE — PROGRESS NOTES
TRANSFER - OUT REPORT:    Verbal report given to VIRAJ Vergara(name) on [employer]  being transferred to [facility]) for routine progression of care       Report consisted of patients Situation, Background, Assessment and   Recommendations(SBAR). Information from the following report(s) MAR was reviewed with the receiving nurse. Lines:   Peripheral IV 05/26/17 Right Arm (Active)   Site Assessment Clean, dry, & intact 5/26/2017  8:48 AM   Phlebitis Assessment 0 5/26/2017  8:48 AM   Infiltration Assessment 0 5/26/2017  8:48 AM   Dressing Status Clean, dry, & intact 5/26/2017  8:48 AM        Opportunity for questions and clarification was provided.       Patient transported with:   Registered Nurse

## 2017-05-26 NOTE — IP AVS SNAPSHOT
[address] [facility] 
[phone] Patient: Earl Davis MRN: [id number] SGP:6/41/9097 You are allergic to the following No active allergies Recent Documentation Height Weight Breastfeeding? BMI OB Status Smoking Status 1.676 m 79.4 kg No 28.25 kg/m2 Hysterectomy Current Every Day Smoker Emergency Contacts Name Discharge Info Relation Home Work Mobile [address]  Other Relative [6] [phone] Milad Yao  Child [2] [phone] Aubrey Yao  Child [2] [phone] About your hospitalization You were admitted on:  May 26, 2017 You last received care in the:  [facility] You were discharged on:  May 26, 2017 Unit phone number:  [phone] Why you were hospitalized Your primary diagnosis was:  Not on File Providers Seen During Your Hospitalizations Provider Role Specialty Primary office phone Ana M Zuniga MD Attending Provider Cardiology [phone] Your Primary Care Physician (PCP) Primary Care Physician Office Phone Office Fax Les Costello [phone] Follow-up Information Follow up With Details Comments Contact Info Mathew Jade, [address] 
[phone] Current Discharge Medication List  
  
CONTINUE these medications which have NOT CHANGED Dose & Instructions Dispensing Information Comments Morning Noon Evening Bedtime  
 amLODIPine 5 mg tablet Commonly known as:  Misa Fast Your last dose was: Your next dose is:    
   
   
 Dose:  5 mg Take 1 tablet by mouth daily. Quantity:  30 tablet Refills:  12  
     
   
   
   
  
 aspirin delayed-release 81 mg tablet Your last dose was: Your next dose is: Take  by mouth daily. Refills:  0 atenolol 25 mg tablet Commonly known as:  TENORMIN Your last dose was: Your next dose is:    
   
   
 Dose:  25 mg Take 25 mg by mouth daily. Refills:  0  
     
   
   
   
  
 atorvastatin 20 mg tablet Commonly known as:  LIPITOR Your last dose was: Your next dose is:    
   
   
 Dose:  20 mg Take 1 tablet by mouth nightly. Quantity:  30 tablet Refills:  12  
     
   
   
   
  
 * diclofenac EC 75 mg EC tablet Commonly known as:  VOLTAREN Your last dose was: Your next dose is:    
   
   
 Dose:  75 mg Take 75 mg by mouth two (2) times a day. Refills:  0  
     
   
   
   
  
 * VOLTAREN 1 % Gel Generic drug:  diclofenac Your last dose was: Your next dose is:    
   
   
 Apply  to affected area four (4) times daily. Refills:  0  
     
   
   
   
  
 gabapentin 600 mg tablet Commonly known as:  NEURONTIN Your last dose was: Your next dose is:    
   
   
 Dose:  600 mg Take 600 mg by mouth four (4) times daily as needed. Refills:  0 LINZESS 145 mcg Cap capsule Generic drug:  linaclotide Your last dose was: Your next dose is: Take  by mouth Daily (before breakfast). Refills:  0  
     
   
   
   
  
 lisinopril 10 mg tablet Commonly known as:  Gregory Mash Your last dose was: Your next dose is:    
   
   
 Dose:  10 mg Take 1 tablet by mouth daily. Quantity:  30 tablet Refills:  12  
     
   
   
   
  
 magnesium oxide 400 mg tablet Commonly known as:  MAG-OX Your last dose was: Your next dose is: TAKE 1 TABLET EVERY DAY Refills:  6  
     
   
   
   
  
 metroNIDAZOLE 500 mg tablet Commonly known as:  FLAGYL Your last dose was: Your next dose is:    
   
   
 Dose:  2000 mg Take 4 Tabs by mouth once as needed for up to 1 dose. Quantity:  4 Tab Refills:  0  
     
   
   
   
  
 nitroglycerin 0.4 mg SL tablet Commonly known as:  NITROSTAT Your last dose was: Your next dose is:    
   
   
 Dose:  0.4 mg  
1 tablet by SubLINGual route every five (5) minutes as needed for Chest Pain. Quantity:  25 tablet Refills:  12  
     
   
   
   
  
 nystatin-triamcinolone topical cream  
Commonly known as:  MYCOLOG II Your last dose was: Your next dose is:    
   
   
 Apply  to affected area two (2) times a day. Quantity:  30 g Refills:  3  
     
   
   
   
  
 omeprazole 20 mg capsule Commonly known as:  PRILOSEC Your last dose was: Your next dose is:    
   
   
 Dose:  20 mg Take 20 mg by mouth daily. Refills:  0  
     
   
   
   
  
 ONE DAILY FOR WOMEN PO Your last dose was: Your next dose is: Take  by mouth daily. Refills:  0  
     
   
   
   
  
 traMADol 50 mg tablet Commonly known as:  ULTRAM  
   
Your last dose was: Your next dose is:    
   
   
 Dose:  50 mg Take 50 mg by mouth two (2) times daily as needed for Pain. Refills:  0  
     
   
   
   
  
 VITAMIN B-12 100 mcg tablet Generic drug:  cyanocobalamin Your last dose was: Your next dose is:    
   
   
 Dose:  100 mcg Take 100 mcg by mouth daily. Refills:  0  
     
   
   
   
  
 VITAMIN D3 2,000 unit Tab Generic drug:  cholecalciferol (vitamin D3) Your last dose was: Your next dose is: Take  by mouth. Refills:  0  
     
   
   
   
  
 * Notice: This list has 2 medication(s) that are the same as other medications prescribed for you. Read the directions carefully, and ask your doctor or other care provider to review them with you. Discharge Instructions Deandra Reyes, [facility] [address]  [phone] CARDIOLOGY DISCHARGE INSTRUCTIONS Patient ID: 
Jian De Luna [id number] 80 y.o. 
1945 Admit Date: 5/26/2017 Discharge Date: 5/26/2017 Admitting Physician: Kat Reveles MD  
 
Discharge Physician: Kat Reveles MD 
 
Admission Diagnoses: ABN TEST Discharge Diagnoses: Active Problems: * No active hospital problems. * Discharge Condition: Good Cardiology Procedures this Admission:  LE angio Disposition: home Reference discharge instructions provided by nursing for diet and activity. Signed: Kat Reveles MD 
5/26/2017 
9:51 AM 
PERIPHERAL CATHETERIZATION/PCI DISCHARGE INSTRUCTIONS It is normal to feel tired the first couple days. Take it easy and follow the physicians instructions. CHECK THE CATHETER INSERTION SITE DAILY: 
 
You may shower 24 hours after the procedure, remove the bandage during showering. Wash with soap and water and pat dry. Gentle cleaning of the site with soap and water is sufficient, cover with a dry clean dressing or bandage. Do not apply creams or powders to the area. Do not sit in a bathtub or pool of water for 7 days or until wound has completely healed. Temporary bruising and discomfort is normal and may last a few weeks. You may have a  formation of a small lump at the site which may last up to 6 weeks. CALL THE PHYSICIANS: 
 
If the site becomes red, swollen or feels warm to the touch If there is bleeding or drainage or if there is unusual pain at the groin or down the leg. If there is any bleeding, lie down, apply pressure or have someone apply pressure with a clean cloth until the bleeding stops. If the bleeding continues, call 911 to be transported to the hospital. 
DO  [place] [address]. ACTIVITY: 
 
For the first 24-48 hours or as instructed by the physician: No lifting, pushing or pulling over 10 pounds and no straining the insertion site.  Do not life grocery bags or the garbage can, do not run the vacuum  or  for 7 days. Start with short walks as in the hospital and gradually increase as tolerated each day. It is recommended to walk 30 minutes 5-7 days per week. Follow your physicians instructions on activity. Avoid walking outside in extremes of heat or cold. Walk inside when it is cold and windy or hot and humid. Things to keep in mind: 
No driving for at least 24 hours, or as designated by your physician. Limit the number of times you go up and down the stairs Take rests and pace yourself with activity. Be careful and do not strain with bowel movements. MEDICATIONS: 
 
Take all medications as prescribed Call your physician if you have any questions Keep an updated list of your medications with you at all times and give a list to your physician and pharmacist 
 
 
AFTER CARE: 
 
Follow up with your physician as instructed. Follow a heart healthy diet with proper portion control, daily stress management, daily exercise, blood pressure and cholesterol control , and smoking cessation. Discharge Orders None Introducing [facility]! Joana Mullen introduces [email or web address] patient portal. Now you can access parts of your medical record, email your doctor's office, and request medication refills online. 1. In your internet browser, go to [email or web address] 2. Click on the First Time User? Click Here link in the Sign In box. You will see the New Member Sign Up page. 3. Enter your [email or web address] Access Code exactly as it appears below. You will not need to use this code after youve completed the sign-up process. If you do not sign up before the expiration date, you must request a new code. · [email or web address] Access Code: [email or web address] Expires: 7/24/2017  2:24 PM 
 
4. Enter the last four digits of your Social Security Number (xxxx) and Date of Birth (mm/dd/yyyy) as indicated and click Submit.  You will be taken to the next sign-up page. 5. Create a Agent Ace ID. This will be your [employer] login ID and cannot be changed, so think of one that is secure and easy to remember. 6. Create a [employer] password. You can change your password at any time. 7. Enter your Password Reset Question and Answer. This can be used at a later time if you forget your password. 8. Enter your e-mail address. You will receive e-mail notification when new information is available in [address] 9. Click Sign Up. You can now view and download portions of your medical record. 10. Click the Download Summary menu link to download a portable copy of your medical information. If you have questions, please visit the Frequently Asked Questions section of the [employer] website. Remember, [employer] is NOT to be used for urgent needs. For medical emergencies, dial 911. Now available from your iPhone and Android! General Information Please provide this summary of care documentation to your next provider. Patient Signature:  ____________________________________________________________ Date:  ____________________________________________________________  
  
[facility] Provider Signature:  ____________________________________________________________ Date:  ____________________________________________________________

## 2017-05-27 NOTE — PROCEDURES
[facility] [address] [facility]   PERIPHERAL ANGIOGRAPHY       Name:  Gabriella Workman   MR#:  [id number]   :  1945   Account #:  [de-identified]        Date of Adm:  2017       PROCEDURE:   1. Abdominal aortogram.   2. Bilateral lower extremity angiography. 3. 3rd order cath placement on right common femoral artery, in left common   femoral artery. 4. Central line access using ultrasound guidance. FINDINGS:   1. Abdominal aorta: Mild lateral sclerosis. No evidence of any aortic   aneurysm, aortic stenosis noted. Bilateral renal arteries   are angiographically patent. 2. Right common iliac artery: Mild disease. 3. Right external iliac artery: No significant stenosis. 4. Right internal iliac artery: No significant stenosis. 5. Right common femoral artery: Minor irregularities. No significant   stenosis. 6. Right profunda femoris artery: No significant stenosis. 7. Superficial femoral artery: Minor irregularity. No significant stenosis. 8. Right popliteal artery: No significant stenosis. 9. Right infrapopliteal vessels: 2 vessel distal runoff is noted. No   evidence of any significant stenosis. Flow is sluggish. 10. The left common iliac artery: No significant stenosis. Mild   atherosclerosis. 11. Left internal iliac artery: Focal 50% stenosis at the ostium. No other   significant disease. 12. Left external iliac artery: No significant stenosis. 13. Left common femoral artery: Mild disease. No significant stenosis. 14. Left profunda femoris artery: Ostial 30% focal stenosis. No   significant disease. 15. Left superficial femoral artery: Mild disease. Focal 20% to 30%   stenosis in the mid segment without any significant pressure gradient   across the lesion. 16. Left popliteal artery: No significant stenosis. 17: Left infra popliteal vessels: 2 vessel distal runoff is noted in the left   plantar arch.  Left anterior iliac artery is smaller and atretic. Left peroneal and   posterior tibial arteries supply blood flow to left plantar arch. Brief note: Right CFA access was obtained using ultrasound guidance and   modified Seldinger technique. Five-Ukrainian  sheath was placed. Five-Ukrainian pigtail catheter was advanced into the abdominal aorta. The abdominal aortogram was performed. Right lower extremity   angiogram was performed through the right common femoral artery   sheath. Rim catheter and VersaCore wire was used to cross over to   the left common femoral artery. Dedicated angiogram of left lower   extremity was performed. Pressure   pullback across the left superficial femoral artery lesion was performed. CONCLUSION: No evidence of any significant peripheral arterial   disease. COMPLICATIONS: None. AMOUNT OF BLOOD LOSS: Minimal.     SPECIMENS REMOVED: None. ANESTHESIA: IV conscious sedation local anesthesia.               Shreya Fernandez MD      [address] [place] [address].Mervin   D:  05/26/2017   11:08   T:  05/27/2017   10:55   Job #:  [id number]

## 2018-03-09 ENCOUNTER — HOSPITAL ENCOUNTER (OUTPATIENT)
Dept: MAMMOGRAPHY | Age: 73
Discharge: HOME | End: 2018-03-09
Attending: FAMILY MEDICINE
Payer: MEDICARE

## 2018-03-09 ENCOUNTER — HOSPITAL ENCOUNTER (OUTPATIENT)
Dept: BONE DENSITY | Age: 73
Discharge: HOME | End: 2018-03-09
Attending: FAMILY MEDICINE
Payer: MEDICARE

## 2018-03-09 DIAGNOSIS — Z12.31 VISIT FOR SCREENING MAMMOGRAM: ICD-10-CM

## 2018-03-09 DIAGNOSIS — M81.0 OSTEOPOROSIS: ICD-10-CM

## 2018-03-09 PROCEDURE — 77080 DXA BONE DENSITY AXIAL: CPT

## 2018-03-09 PROCEDURE — 77067 SCR MAMMO BI INCL CAD: CPT

## 2018-06-29 ENCOUNTER — HOSPITAL ENCOUNTER (OUTPATIENT)
Dept: CT IMAGING | Age: 73
Discharge: HOME | End: 2018-06-29
Attending: INTERNAL MEDICINE
Payer: MEDICARE

## 2018-06-29 DIAGNOSIS — R19.01 RUQ ABDOMINAL MASS: ICD-10-CM

## 2018-06-29 PROCEDURE — 74160 CT ABDOMEN W/CONTRAST: CPT

## 2018-06-29 PROCEDURE — 74011000255 HC RX REV CODE- 255: Performed by: RADIOLOGY

## 2018-06-29 RX ORDER — BARIUM SULFATE 20 MG/ML
450 SUSPENSION ORAL
Status: COMPLETED | OUTPATIENT
Start: 2018-06-29 | End: 2018-06-29

## 2018-06-29 RX ADMIN — BARIUM SULFATE 450 ML: 20 SUSPENSION ORAL at 12:49

## 2019-04-16 ENCOUNTER — HOSPITAL ENCOUNTER (OUTPATIENT)
Dept: MAMMOGRAPHY | Age: 74
Discharge: HOME | End: 2019-04-16
Attending: INTERNAL MEDICINE
Payer: MEDICARE

## 2019-04-16 DIAGNOSIS — Z12.39 SCREENING BREAST EXAMINATION: ICD-10-CM

## 2019-04-16 PROCEDURE — 77067 SCR MAMMO BI INCL CAD: CPT

## 2020-11-11 ENCOUNTER — HOSPITAL ENCOUNTER (OUTPATIENT)
Dept: MAMMOGRAPHY | Age: 75
Discharge: HOME | End: 2020-11-11
Attending: INTERNAL MEDICINE
Payer: MEDICARE

## 2020-11-11 ENCOUNTER — HOSPITAL ENCOUNTER (OUTPATIENT)
Dept: BONE DENSITY | Age: 75
Discharge: HOME | End: 2020-11-11
Attending: INTERNAL MEDICINE
Payer: MEDICARE

## 2020-11-11 DIAGNOSIS — Z78.0 POSTMENOPAUSAL: ICD-10-CM

## 2020-11-11 DIAGNOSIS — Z12.31 VISIT FOR SCREENING MAMMOGRAM: ICD-10-CM

## 2020-11-11 PROCEDURE — 77067 SCR MAMMO BI INCL CAD: CPT

## 2020-11-11 PROCEDURE — 77080 DXA BONE DENSITY AXIAL: CPT

## 2021-03-29 ENCOUNTER — TRANSCRIBE ORDER (OUTPATIENT)
Dept: SCHEDULING | Age: 76
End: 2021-03-29

## 2021-03-29 DIAGNOSIS — J44.9 COPD (CHRONIC OBSTRUCTIVE PULMONARY DISEASE) (HCC): ICD-10-CM

## 2021-03-29 DIAGNOSIS — Z72.0 TOBACCO ABUSE: Primary | ICD-10-CM

## 2021-05-19 ENCOUNTER — HOSPITAL ENCOUNTER (OUTPATIENT)
Dept: CT IMAGING | Age: 76
Discharge: HOME | End: 2021-05-19
Attending: INTERNAL MEDICINE
Payer: MEDICARE

## 2021-05-19 DIAGNOSIS — Z72.0 TOBACCO ABUSE: ICD-10-CM

## 2021-05-19 DIAGNOSIS — J44.9 COPD (CHRONIC OBSTRUCTIVE PULMONARY DISEASE) (HCC): ICD-10-CM

## 2021-05-19 PROCEDURE — 71271 CT THORAX LUNG CANCER SCR C-: CPT

## 2021-08-11 ENCOUNTER — TRANSCRIBE ORDER (OUTPATIENT)
Dept: SCHEDULING | Age: 76
End: 2021-08-11

## 2021-08-11 DIAGNOSIS — G56.93 NEUROPATHY OF BOTH UPPER EXTREMITIES: Primary | ICD-10-CM

## 2021-08-17 ENCOUNTER — HOSPITAL ENCOUNTER (OUTPATIENT)
Dept: MRI IMAGING | Age: 76
Discharge: HOME | End: 2021-08-17
Attending: INTERNAL MEDICINE
Payer: MEDICARE

## 2021-08-17 DIAGNOSIS — G56.93 NEUROPATHY OF BOTH UPPER EXTREMITIES: ICD-10-CM

## 2021-08-17 PROCEDURE — 72141 MRI NECK SPINE W/O DYE: CPT

## 2021-10-29 ENCOUNTER — TRANSCRIBE ORDER (OUTPATIENT)
Dept: SCHEDULING | Age: 76
End: 2021-10-29

## 2021-10-29 DIAGNOSIS — Z12.31 VISIT FOR SCREENING MAMMOGRAM: Primary | ICD-10-CM

## 2021-11-12 ENCOUNTER — HOSPITAL ENCOUNTER (OUTPATIENT)
Dept: MAMMOGRAPHY | Age: 76
Discharge: HOME | End: 2021-11-12
Attending: INTERNAL MEDICINE

## 2021-11-12 DIAGNOSIS — Z12.31 VISIT FOR SCREENING MAMMOGRAM: ICD-10-CM

## 2022-02-15 ENCOUNTER — HOSPITAL ENCOUNTER (OUTPATIENT)
Dept: MAMMOGRAPHY | Age: 77
Discharge: HOME | End: 2022-02-15
Attending: INTERNAL MEDICINE
Payer: MEDICARE

## 2022-02-15 PROCEDURE — 77067 SCR MAMMO BI INCL CAD: CPT

## 2022-03-18 PROBLEM — R68.89 ABNORMAL ANKLE BRACHIAL INDEX (ABI): Status: ACTIVE | Noted: 2017-05-17

## 2022-03-18 PROBLEM — I10 ESSENTIAL HYPERTENSION: Status: ACTIVE | Noted: 2017-04-25

## 2022-03-19 PROBLEM — R09.89 DECREASED PULSES IN FEET: Status: ACTIVE | Noted: 2017-04-25

## 2022-03-19 PROBLEM — E78.2 MIXED HYPERLIPIDEMIA: Status: ACTIVE | Noted: 2017-04-25

## 2022-03-19 PROBLEM — I73.9 CLAUDICATION (HCC): Status: ACTIVE | Noted: 2017-05-17

## 2022-06-22 ENCOUNTER — TRANSCRIBE ORDER (OUTPATIENT)
Dept: SCHEDULING | Age: 77
End: 2022-06-22

## 2022-06-22 DIAGNOSIS — F17.210 CIGARETTE SMOKER: Primary | ICD-10-CM

## 2022-08-26 ENCOUNTER — APPOINTMENT (OUTPATIENT)
Dept: GENERAL RADIOLOGY | Age: 77
DRG: 189 | End: 2022-08-26
Attending: NURSE PRACTITIONER
Payer: MEDICARE

## 2022-08-26 ENCOUNTER — TRANSCRIBE ORDER (OUTPATIENT)
Dept: SCHEDULING | Age: 77
End: 2022-08-26

## 2022-08-26 ENCOUNTER — HOSPITAL ENCOUNTER (INPATIENT)
Age: 77
LOS: 3 days | Discharge: HOME | DRG: 189 | End: 2022-08-29
Attending: EMERGENCY MEDICINE | Admitting: STUDENT IN AN ORGANIZED HEALTH CARE EDUCATION/TRAINING PROGRAM
Payer: MEDICARE

## 2022-08-26 ENCOUNTER — APPOINTMENT (OUTPATIENT)
Dept: CT IMAGING | Age: 77
DRG: 189 | End: 2022-08-26
Attending: NURSE PRACTITIONER
Payer: MEDICARE

## 2022-08-26 DIAGNOSIS — I73.9 CLAUDICATION IN PERIPHERAL VASCULAR DISEASE (HCC): ICD-10-CM

## 2022-08-26 DIAGNOSIS — J44.1 ACUTE EXACERBATION OF CHRONIC OBSTRUCTIVE PULMONARY DISEASE (COPD) (HCC): Primary | ICD-10-CM

## 2022-08-26 DIAGNOSIS — I25.10 CAD (CORONARY ARTERY DISEASE): ICD-10-CM

## 2022-08-26 DIAGNOSIS — R07.9 CHEST PAIN AT REST: ICD-10-CM

## 2022-08-26 DIAGNOSIS — R09.2 RESPIRATORY ARREST (HCC): Primary | ICD-10-CM

## 2022-08-26 PROBLEM — J96.01 ACUTE RESPIRATORY FAILURE WITH HYPOXIA (HCC): Status: ACTIVE | Noted: 2022-08-26

## 2022-08-26 LAB
ALBUMIN SERPL-MCNC: 3.5 G/DL (ref 3.5–5)
ALBUMIN/GLOB SERPL: 0.9 {RATIO} (ref 1.1–2.2)
ALP SERPL-CCNC: 85 U/L (ref 45–117)
ALT SERPL-CCNC: 23 U/L (ref 12–78)
ANION GAP SERPL CALC-SCNC: 4 MMOL/L (ref 5–15)
AST SERPL-CCNC: 19 U/L (ref 15–37)
BASOPHILS # BLD: 0.1 K/UL (ref 0–0.1)
BASOPHILS NFR BLD: 1 % (ref 0–1)
BILIRUB SERPL-MCNC: 1 MG/DL (ref 0.2–1)
BNP SERPL-MCNC: 1223 PG/ML (ref 0–450)
BUN SERPL-MCNC: 15 MG/DL (ref 6–20)
BUN/CREAT SERPL: 19 (ref 12–20)
CALCIUM SERPL-MCNC: 9.1 MG/DL (ref 8.5–10.1)
CHLORIDE SERPL-SCNC: 98 MMOL/L (ref 97–108)
CO2 SERPL-SCNC: 39 MMOL/L (ref 21–32)
CREAT SERPL-MCNC: 0.77 MG/DL (ref 0.55–1.02)
DIFFERENTIAL METHOD BLD: ABNORMAL
EOSINOPHIL # BLD: 0.3 K/UL (ref 0–0.4)
EOSINOPHIL NFR BLD: 4 % (ref 0–7)
ERYTHROCYTE [DISTWIDTH] IN BLOOD BY AUTOMATED COUNT: 18.5 % (ref 11.5–14.5)
FLUAV RNA SPEC QL NAA+PROBE: NOT DETECTED
FLUBV RNA SPEC QL NAA+PROBE: NOT DETECTED
GLOBULIN SER CALC-MCNC: 4.1 G/DL (ref 2–4)
GLUCOSE SERPL-MCNC: 95 MG/DL (ref 65–100)
HCT VFR BLD AUTO: 51.2 % (ref 35–47)
HGB BLD-MCNC: 15.1 G/DL (ref 11.5–16)
IMM GRANULOCYTES # BLD AUTO: 0 K/UL (ref 0–0.04)
IMM GRANULOCYTES NFR BLD AUTO: 0 % (ref 0–0.5)
LACTATE SERPL-SCNC: 2 MMOL/L (ref 0.4–2)
LYMPHOCYTES # BLD: 1.9 K/UL (ref 0.8–3.5)
LYMPHOCYTES NFR BLD: 33 % (ref 12–49)
MCH RBC QN AUTO: 26.3 PG (ref 26–34)
MCHC RBC AUTO-ENTMCNC: 29.5 G/DL (ref 30–36.5)
MCV RBC AUTO: 89 FL (ref 80–99)
MONOCYTES # BLD: 1 K/UL (ref 0–1)
MONOCYTES NFR BLD: 18 % (ref 5–13)
NEUTS SEG # BLD: 2.5 K/UL (ref 1.8–8)
NEUTS SEG NFR BLD: 44 % (ref 32–75)
NRBC # BLD: 0 K/UL (ref 0–0.01)
NRBC BLD-RTO: 0 PER 100 WBC
PLATELET # BLD AUTO: 157 K/UL (ref 150–400)
PMV BLD AUTO: 11.7 FL (ref 8.9–12.9)
POTASSIUM SERPL-SCNC: 4 MMOL/L (ref 3.5–5.1)
PROT SERPL-MCNC: 7.6 G/DL (ref 6.4–8.2)
RBC # BLD AUTO: 5.75 M/UL (ref 3.8–5.2)
SARS-COV-2, COV2: NOT DETECTED
SODIUM SERPL-SCNC: 141 MMOL/L (ref 136–145)
TROPONIN-HIGH SENSITIVITY: 32 NG/L (ref 0–51)
WBC # BLD AUTO: 5.8 K/UL (ref 3.6–11)

## 2022-08-26 PROCEDURE — 71275 CT ANGIOGRAPHY CHEST: CPT

## 2022-08-26 PROCEDURE — 77010033678 HC OXYGEN DAILY

## 2022-08-26 PROCEDURE — 36415 COLL VENOUS BLD VENIPUNCTURE: CPT

## 2022-08-26 PROCEDURE — 83605 ASSAY OF LACTIC ACID: CPT

## 2022-08-26 PROCEDURE — 94760 N-INVAS EAR/PLS OXIMETRY 1: CPT

## 2022-08-26 PROCEDURE — 83880 ASSAY OF NATRIURETIC PEPTIDE: CPT

## 2022-08-26 PROCEDURE — 74011000636 HC RX REV CODE- 636: Performed by: EMERGENCY MEDICINE

## 2022-08-26 PROCEDURE — 74011000250 HC RX REV CODE- 250: Performed by: NURSE PRACTITIONER

## 2022-08-26 PROCEDURE — 77030029684 HC NEB SM VOL KT MONA -A

## 2022-08-26 PROCEDURE — 80053 COMPREHEN METABOLIC PANEL: CPT

## 2022-08-26 PROCEDURE — 71045 X-RAY EXAM CHEST 1 VIEW: CPT

## 2022-08-26 PROCEDURE — 85025 COMPLETE CBC W/AUTO DIFF WBC: CPT

## 2022-08-26 PROCEDURE — 74011250636 HC RX REV CODE- 250/636: Performed by: STUDENT IN AN ORGANIZED HEALTH CARE EDUCATION/TRAINING PROGRAM

## 2022-08-26 PROCEDURE — 87040 BLOOD CULTURE FOR BACTERIA: CPT

## 2022-08-26 PROCEDURE — 84484 ASSAY OF TROPONIN QUANT: CPT

## 2022-08-26 PROCEDURE — 87636 SARSCOV2 & INF A&B AMP PRB: CPT

## 2022-08-26 PROCEDURE — 93005 ELECTROCARDIOGRAM TRACING: CPT

## 2022-08-26 PROCEDURE — 99285 EMERGENCY DEPT VISIT HI MDM: CPT

## 2022-08-26 PROCEDURE — 74011636637 HC RX REV CODE- 636/637: Performed by: NURSE PRACTITIONER

## 2022-08-26 PROCEDURE — 74011000250 HC RX REV CODE- 250: Performed by: STUDENT IN AN ORGANIZED HEALTH CARE EDUCATION/TRAINING PROGRAM

## 2022-08-26 PROCEDURE — 94640 AIRWAY INHALATION TREATMENT: CPT

## 2022-08-26 PROCEDURE — 74011250637 HC RX REV CODE- 250/637: Performed by: STUDENT IN AN ORGANIZED HEALTH CARE EDUCATION/TRAINING PROGRAM

## 2022-08-26 PROCEDURE — 65270000032 HC RM SEMIPRIVATE

## 2022-08-26 RX ORDER — AZITHROMYCIN 250 MG/1
1 TABLET, FILM COATED ORAL DAILY
COMMUNITY
Start: 2022-08-24 | End: 2022-08-29

## 2022-08-26 RX ORDER — ONDANSETRON 4 MG/1
4 TABLET, ORALLY DISINTEGRATING ORAL
Status: DISCONTINUED | OUTPATIENT
Start: 2022-08-26 | End: 2022-08-29 | Stop reason: HOSPADM

## 2022-08-26 RX ORDER — POLYETHYLENE GLYCOL 3350 17 G/17G
17 POWDER, FOR SOLUTION ORAL DAILY PRN
Status: DISCONTINUED | OUTPATIENT
Start: 2022-08-26 | End: 2022-08-29 | Stop reason: HOSPADM

## 2022-08-26 RX ORDER — PANTOPRAZOLE SODIUM 40 MG/1
40 TABLET, DELAYED RELEASE ORAL
Status: DISCONTINUED | OUTPATIENT
Start: 2022-08-27 | End: 2022-08-29 | Stop reason: HOSPADM

## 2022-08-26 RX ORDER — METOPROLOL TARTRATE 25 MG/1
1 TABLET, FILM COATED ORAL 2 TIMES DAILY
COMMUNITY
Start: 2022-08-05

## 2022-08-26 RX ORDER — IPRATROPIUM BROMIDE AND ALBUTEROL SULFATE 2.5; .5 MG/3ML; MG/3ML
3 SOLUTION RESPIRATORY (INHALATION)
Status: DISCONTINUED | OUTPATIENT
Start: 2022-08-26 | End: 2022-08-27

## 2022-08-26 RX ORDER — PREDNISONE 20 MG/1
60 TABLET ORAL
Status: COMPLETED | OUTPATIENT
Start: 2022-08-26 | End: 2022-08-26

## 2022-08-26 RX ORDER — SODIUM CHLORIDE 0.9 % (FLUSH) 0.9 %
5-40 SYRINGE (ML) INJECTION EVERY 8 HOURS
Status: DISCONTINUED | OUTPATIENT
Start: 2022-08-26 | End: 2022-08-29 | Stop reason: HOSPADM

## 2022-08-26 RX ORDER — AMLODIPINE BESYLATE 5 MG/1
5 TABLET ORAL DAILY
Status: DISCONTINUED | OUTPATIENT
Start: 2022-08-27 | End: 2022-08-27

## 2022-08-26 RX ORDER — FLUTICASONE PROPIONATE AND SALMETEROL 500; 50 UG/1; UG/1
1 POWDER RESPIRATORY (INHALATION) 2 TIMES DAILY
COMMUNITY
Start: 2022-08-05

## 2022-08-26 RX ORDER — UREA 10 %
100 LOTION (ML) TOPICAL DAILY
Status: DISCONTINUED | OUTPATIENT
Start: 2022-08-27 | End: 2022-08-29 | Stop reason: HOSPADM

## 2022-08-26 RX ORDER — FUROSEMIDE 20 MG/1
1 TABLET ORAL DAILY
COMMUNITY
Start: 2022-08-05

## 2022-08-26 RX ORDER — IPRATROPIUM BROMIDE AND ALBUTEROL SULFATE 2.5; .5 MG/3ML; MG/3ML
3 SOLUTION RESPIRATORY (INHALATION)
Status: DISCONTINUED | OUTPATIENT
Start: 2022-08-26 | End: 2022-08-29 | Stop reason: HOSPADM

## 2022-08-26 RX ORDER — ACETAMINOPHEN 650 MG/1
650 SUPPOSITORY RECTAL
Status: DISCONTINUED | OUTPATIENT
Start: 2022-08-26 | End: 2022-08-29 | Stop reason: HOSPADM

## 2022-08-26 RX ORDER — IPRATROPIUM BROMIDE AND ALBUTEROL SULFATE 2.5; .5 MG/3ML; MG/3ML
3 SOLUTION RESPIRATORY (INHALATION)
Status: COMPLETED | OUTPATIENT
Start: 2022-08-26 | End: 2022-08-26

## 2022-08-26 RX ORDER — ACETAMINOPHEN 325 MG/1
650 TABLET ORAL
Status: DISCONTINUED | OUTPATIENT
Start: 2022-08-26 | End: 2022-08-29 | Stop reason: HOSPADM

## 2022-08-26 RX ORDER — SODIUM CHLORIDE 0.9 % (FLUSH) 0.9 %
5-40 SYRINGE (ML) INJECTION AS NEEDED
Status: DISCONTINUED | OUTPATIENT
Start: 2022-08-26 | End: 2022-08-29 | Stop reason: HOSPADM

## 2022-08-26 RX ORDER — ENOXAPARIN SODIUM 100 MG/ML
40 INJECTION SUBCUTANEOUS DAILY
Status: DISCONTINUED | OUTPATIENT
Start: 2022-08-27 | End: 2022-08-29 | Stop reason: HOSPADM

## 2022-08-26 RX ORDER — DICLOFENAC SODIUM 10 MG/G
2 GEL TOPICAL 4 TIMES DAILY
Status: DISCONTINUED | OUTPATIENT
Start: 2022-08-26 | End: 2022-08-29 | Stop reason: HOSPADM

## 2022-08-26 RX ORDER — PREDNISONE 20 MG/1
TABLET ORAL
COMMUNITY
Start: 2022-08-24 | End: 2022-08-29

## 2022-08-26 RX ORDER — ATORVASTATIN CALCIUM 40 MG/1
1 TABLET, FILM COATED ORAL DAILY
COMMUNITY
Start: 2022-07-30

## 2022-08-26 RX ORDER — LISINOPRIL 5 MG/1
10 TABLET ORAL DAILY
Status: DISCONTINUED | OUTPATIENT
Start: 2022-08-27 | End: 2022-08-29 | Stop reason: HOSPADM

## 2022-08-26 RX ORDER — DULOXETIN HYDROCHLORIDE 60 MG/1
1 CAPSULE, DELAYED RELEASE ORAL DAILY
COMMUNITY
Start: 2022-08-09

## 2022-08-26 RX ORDER — ALBUTEROL SULFATE 90 UG/1
2 AEROSOL, METERED RESPIRATORY (INHALATION)
COMMUNITY

## 2022-08-26 RX ORDER — ONDANSETRON 2 MG/ML
4 INJECTION INTRAMUSCULAR; INTRAVENOUS
Status: DISCONTINUED | OUTPATIENT
Start: 2022-08-26 | End: 2022-08-29 | Stop reason: HOSPADM

## 2022-08-26 RX ORDER — ALENDRONATE SODIUM 70 MG/1
1 TABLET ORAL
COMMUNITY
Start: 2022-08-24

## 2022-08-26 RX ORDER — ASPIRIN 81 MG/1
81 TABLET ORAL DAILY
Status: DISCONTINUED | OUTPATIENT
Start: 2022-08-27 | End: 2022-08-29 | Stop reason: HOSPADM

## 2022-08-26 RX ORDER — GABAPENTIN 800 MG/1
1 TABLET ORAL 3 TIMES DAILY
COMMUNITY
Start: 2022-07-01

## 2022-08-26 RX ORDER — ATORVASTATIN CALCIUM 10 MG/1
20 TABLET, FILM COATED ORAL
Status: DISCONTINUED | OUTPATIENT
Start: 2022-08-26 | End: 2022-08-29

## 2022-08-26 RX ADMIN — METHYLPREDNISOLONE SODIUM SUCCINATE 40 MG: 40 INJECTION, POWDER, FOR SOLUTION INTRAMUSCULAR; INTRAVENOUS at 23:54

## 2022-08-26 RX ADMIN — PREDNISONE 60 MG: 20 TABLET ORAL at 15:58

## 2022-08-26 RX ADMIN — IPRATROPIUM BROMIDE AND ALBUTEROL SULFATE 3 ML: .5; 3 SOLUTION RESPIRATORY (INHALATION) at 14:04

## 2022-08-26 RX ADMIN — SODIUM CHLORIDE, PRESERVATIVE FREE 10 ML: 5 INJECTION INTRAVENOUS at 21:35

## 2022-08-26 RX ADMIN — IOPAMIDOL 80 ML: 755 INJECTION, SOLUTION INTRAVENOUS at 14:56

## 2022-08-26 RX ADMIN — IPRATROPIUM BROMIDE AND ALBUTEROL SULFATE 3 ML: .5; 3 SOLUTION RESPIRATORY (INHALATION) at 23:51

## 2022-08-26 RX ADMIN — IPRATROPIUM BROMIDE AND ALBUTEROL SULFATE 3 ML: .5; 3 SOLUTION RESPIRATORY (INHALATION) at 21:17

## 2022-08-26 RX ADMIN — ATORVASTATIN CALCIUM 20 MG: 10 TABLET, FILM COATED ORAL at 21:35

## 2022-08-26 RX ADMIN — ONDANSETRON 4 MG: 2 INJECTION INTRAMUSCULAR; INTRAVENOUS at 21:46

## 2022-08-26 RX ADMIN — DICLOFENAC SODIUM 2 G: 10 GEL TOPICAL at 21:35

## 2022-08-26 NOTE — ED PROVIDER NOTES
EMERGENCY DEPARTMENT HISTORY AND PHYSICAL EXAM          Date: 8/26/2022  Patient Name: Zoey Laughlin    History of Presenting Illness     Chief Complaint   Patient presents with    Shortness of Breath     LOW SPO2          History Provided By: Patient    Chief Complaint: shortness of breath  Duration: 1 month  Timing:  Gradual and Worsening  Quality:  Has trouble catching her breath she states and is short of breath even at rest  Severity: Severe  Modifying Factors:   Associated Symptoms:  Cough      HPI: Zoey Laughlin is a 69 y.o. female with a PMH of CAD hypertension hyperlipidemia COPD who presents with a breath for the past month. Patient states she has also had a cough which has been productive for clear mucus. Per patient's doctor patient has declined admission to the hospital for COPD exacerbation and yesterday home oxygen was ordered for patient to have delivered. to her home. Patient came to the ER today for CTA which she had ordered as outpatient and came to the emergency department due to her shortness of breath.     PCP: Shaheen Moss MD    Current Facility-Administered Medications   Medication Dose Route Frequency Provider Last Rate Last Admin    [START ON 8/28/2022] amLODIPine (NORVASC) tablet 10 mg  10 mg Oral DAILY Krystlelory Cross MD        albuterol-ipratropium (DUO-NEB) 2.5 MG-0.5 MG/3 ML  3 mL Nebulization Q6H RT Krystle Cross, MD   3 mL at 08/27/22 1400    sodium chloride (NS) flush 5-40 mL  5-40 mL IntraVENous Q8H Krystlelory Cross MD   10 mL at 08/27/22 1400    sodium chloride (NS) flush 5-40 mL  5-40 mL IntraVENous PRN Krystle Cross MD        acetaminophen (TYLENOL) tablet 650 mg  650 mg Oral Q6H PRN Krystlelory Cross MD        Or    acetaminophen (TYLENOL) suppository 650 mg  650 mg Rectal Q6H PRN Krystlelory Cross MD        polyethylene glycol (MIRALAX) packet 17 g  17 g Oral DAILY PRN Krystle Cross MD        ondansetron (ZOFRAN ODT) tablet 4 mg  4 mg Oral Q8H PRN Franky Booker MD        Or    ondansetron [facility]) injection 4 mg  4 mg IntraVENous Q6H PRN Franky Booker MD   4 mg at 08/26/22 2146    enoxaparin (LOVENOX) injection 40 mg  40 mg SubCUTAneous DAILY Franky Booker MD   40 mg at 08/27/22 0847    methylPREDNISolone (PF) (SOLU-MEDROL) injection 40 mg  40 mg IntraVENous Q6H Franky Booker MD   40 mg at 08/27/22 1224    albuterol-ipratropium (DUO-NEB) 2.5 MG-0.5 MG/3 ML  3 mL Nebulization Q4H PRN Franky Booker MD        aspirin delayed-release tablet 81 mg  81 mg Oral DAILY Franky Booker MD   81 mg at 08/27/22 0847    atorvastatin (LIPITOR) tablet 20 mg  20 mg Oral QHS Franky Booker MD   20 mg at 08/26/22 2135    cyanocobalamin (VITAMIN B12) tablet 100 mcg  100 mcg Oral DAILY Franky Booker MD   100 mcg at 08/27/22 0381    diclofenac (VOLTAREN) 1 % topical gel 2 g  2 g Topical QID Franky Booker MD   2 g at 08/27/22 1235    lisinopriL (PRINIVIL, ZESTRIL) tablet 10 mg  10 mg Oral DAILY Franky Booker MD   10 mg at 08/27/22 0847    pantoprazole (PROTONIX) tablet 40 mg  40 mg Oral ACB Franky Booker MD   40 mg at 08/27/22 0470       Past History     Past Medical History:  Past Medical History:   Diagnosis Date    Arthritis 2014    CAD (coronary artery disease)     Chronic obstructive pulmonary disease ([facility] [address] 2016    Hypercholesterolemia     Hypertension        Past Surgical History:  Past Surgical History:   Procedure Laterality Date    HX HYSTERECTOMY  1983       Family History:  Family History   Problem Relation Age of Onset    Diabetes Mother     Lung Disease Father         Lung cancer    Diabetes Maternal Grandmother     Heart Disease Maternal Grandfather     Arthritis-rheumatoid Paternal Grandmother     Cancer Paternal Grandfather        Social History:  Social History     Tobacco Use    Smoking status: Every Day     Packs/day: 0.25     Types: Cigarettes    Smokeless tobacco: Never    Tobacco comments:     patient states she has patches Substance Use Topics    Alcohol use: No    Drug use: No       Allergies:  No Known Allergies      Review of Systems   Review of Systems   Constitutional:  Negative for fatigue and fever. Respiratory:  Positive for cough, shortness of breath and wheezing. Cardiovascular:  Negative for chest pain. Gastrointestinal:  Negative for abdominal pain. Musculoskeletal:  Negative for arthralgias, myalgias, neck pain and neck stiffness. Skin:  Negative for pallor and rash. Neurological:  Negative for dizziness, tremors and headaches. All other systems reviewed and are negative. Physical Exam     Vitals:    08/27/22 1216 08/27/22 1404 08/27/22 1428 08/27/22 1548   BP: 120/68   118/68   Pulse: 93   (!) 102   Resp: 20      Temp: 98.4 °F (36.9 °C)   98.6 °F (37 °C)   SpO2: 93% 97% (!) 88% (!) 88%   Weight:       Height:         Physical Exam  Vitals and nursing note reviewed. Constitutional:       General: She is not in acute distress. Appearance: She is well-developed. HENT:      Head: Normocephalic and atraumatic. Nose: Nose normal.      Mouth/Throat:      Mouth: Mucous membranes are moist.      Pharynx: No oropharyngeal exudate. Eyes:      General:         Right eye: No discharge. Left eye: No discharge. Conjunctiva/sclera: Conjunctivae normal.   Neck:      Trachea: No tracheal deviation. Cardiovascular:      Rate and Rhythm: Regular rhythm. Tachycardia present. Heart sounds: Normal heart sounds. No murmur heard. Pulmonary:      Effort: Pulmonary effort is normal. No respiratory distress. Breath sounds: Examination of the right-upper field reveals decreased breath sounds and wheezing. Examination of the left-upper field reveals decreased breath sounds and wheezing. Decreased breath sounds and wheezing present. No rales. Chest:      Chest wall: No tenderness. Abdominal:      General: Bowel sounds are normal. There is no distension. Palpations: Abdomen is soft. Tenderness: There is no abdominal tenderness. There is no rebound. Musculoskeletal:         General: No tenderness. Normal range of motion. Cervical back: Normal range of motion and neck supple. Right lower leg: Edema present. Comments: Blisters lower leg   Lymphadenopathy:      Cervical: No cervical adenopathy. Skin:     General: Skin is warm and dry. Findings: No erythema or rash. Neurological:      Mental Status: She is alert and oriented to person, place, and time. Cranial Nerves: No cranial nerve deficit. Psychiatric:         Behavior: Behavior normal.         Thought Content: Thought content normal.         Judgment: Judgment normal.         Diagnostic Study Results     Labs -   No results found for this or any previous visit (from the past 12 hour(s)). Radiologic Studies -   CTA CHEST W OR W WO CONT   Final Result   1. No CT evidence for central pulmonary embolus at this time . 2. Enlarged main pulmonary outflow tract. Correlate for pulmonary arterial   hypertension. 3. Emphysema. XR CHEST PORT   Final Result   No acute cardiac pulmonary process. Mild atelectasis in the right   lung base. CT Results  (Last 48 hours)                 08/26/22 1456  CTA CHEST W OR W WO CONT Final result    Impression:  1. No CT evidence for central pulmonary embolus at this time . 2. Enlarged main pulmonary outflow tract. Correlate for pulmonary arterial   hypertension. 3. Emphysema. Narrative:  EXAM: CT ANGIOGRAPHY CHEST        INDICATION:  SOB ??PE was originally ordered as out pt PCP called requested it   now       COMPARISON: 5/19/2021 CT chest without contrast..       CONTRAST: 80 mL of Isovue-370. TECHNIQUE:    Precontrast  images were obtained to localize the volume for acquisition.    Multislice helical CT arteriography was performed from the diaphragm to the   thoracic inlet during uneventful rapid bolus intravenous contrast administration. Lung and soft tissue windows were generated. Coronal and   sagittal  reformatted images were also generated. 3D post processing consisting   of coronal maximum intensity projection images was performed. CT dose reduction   was achieved through use of a standardized protocol tailored for this   examination and automatic exposure control for dose modulation. FINDINGS:   The lungs are clear of mass, nodule, airspace disease or edema. Emphysematous   change. The pulmonary arteries are well enhanced and no pulmonary emboli are identified. Main pulmonary outflow tract 4.1 cm, enlarged. There is no mediastinal or hilar adenopathy or mass. The aorta enhances normally   without evidence of aneurysm or dissection. Aortic calcification with plaque   formation. Mild cardiomegaly. The visualized portions of the upper abdominal organs are normal.                 CXR Results  (Last 48 hours)                 08/26/22 1329  XR CHEST PORT Final result    Impression:  No acute cardiac pulmonary process. Mild atelectasis in the right   lung base. Narrative:  EXAM: XR CHEST PORT       INDICATION: chest pain       COMPARISON: 5/22/2006       FINDINGS: A portable AP radiograph of the chest was obtained at 1326 hours. Cardiac monitoring leads. . Mild patchy atelectasis in the right lung base. . The   cardiac and mediastinal contours and pulmonary vascularity are normal.  The   bones and soft tissues are grossly within normal limits. Medical Decision Making   I am the first provider for this patient. I reviewed the vital signs, available nursing notes, past medical history, past surgical history, family history and social history. Vital Signs-Reviewed the patient's vital signs.     Records Reviewed: Nursing Notes    Provider Notes (Medical Decision Making):   DDX PE pneumonia bronchitis upper respiratory infection  81-year-old female presents ambulatory to the emergency department for a CTA which was ordered by her PCP found to be short of breath present to the emergency department O2 sat 86% on room air. On discussion with the primary care doctor at [facility] Dr. Burk President she advised CTA be done iand patient be admitted. patient had been adamant about staying in the hospital when she was seen at Dr. Spring Vicente office. ED Course as of 08/27/22 1705   Fri Aug 26, 2022   1329 I have discussed available test results and HPI with Dr. Flores Briones hospitalist.  She has accepted admission. [AN]      ED Course User Index  [AN] Shweta Stern NP          Please note that this dictation was completed with Dragon, computer voice recognition software. Quite often unanticipated grammatical, syntax, homophones, and other interpretive errors are inadvertently transcribed by the computer software. Please disregard these errors. Additionally, please excuse any errors that have escaped final proofreading.     Diagnosis     Clinical Impression: exacerbation of COPD

## 2022-08-26 NOTE — PROGRESS NOTES
Transition of Care Plan:  *RUR: TBD     * Disposition- Home  * Transportation at Discharge: TBD  * IM/MOONS/OBS/FOC letter:  2nd IMM  * Appointment with PCP Isaak Farris     CM opened case for assessment of D/C planning needs, CM reviewed chart. Per RN note \"Pt presents to ED reporting that her PCP sent her here. Pt reports she is just here for a CT scan. When questioned by the ED, it was found her O2 was in the low 80s. Pt reports she cary not normally wear oxygen. Pt has COPD and still smokes cigarettes\"     Patient is [facility] at imaging CM to follow-up with patient for assessment.     [address] [facility], [address] [phone]

## 2022-08-26 NOTE — ED TRIAGE NOTES
Pt arrives to the ED AAOX4 with a c/c of low oxygen levels. Pt states her PCP sent her to the ED to get a CT scan. Pt is noted 83% on RA, states she only feels SOB when she \"moved around a lot. \" Pt able to speak in full sentences. Pt ambulated into ED/Triage using her walker. Pt is noted in stable condition and in no acute distress.

## 2022-08-26 NOTE — ED NOTES
After pt placed on 2L NC, O2 noted to be fluctuating between the upper 80s to upper 90s.
IP bed assigned.
Noted that pt keeps taking off her supplemental O2. Pt also keeps unhooking herself from monitoring equiptment.  Multiple attempts made to educate pt on need to stay hooked up to everything and how to use the call bell for help
Pt [facility] for imaging
Pt presents to ED reporting that her PCP sent her here. Pt reports she is just here for a CT scan. When questioned by the ED, it was found her O2 was in the low 80s. Pt reports she cary not normally wear oxygen. Pt has COPD and still smokes cigarettes. Pt is alert and oriented x 4, RR even and unlabored, skin is warm and dry. Assessment completed and pt updated on plan of care. Call bell in reach. Emergency Department Nursing Plan of Care       The Nursing Plan of Care is developed from the Nursing assessment and Emergency Department Attending provider initial evaluation. The plan of care may be reviewed in the ED Provider note.     The Plan of Care was developed with the following considerations:   Patient / Family readiness to learn indicated by:verbalized understanding  Persons(s) to be included in education: patient  Barriers to Learning/Limitations:No    Signed     Caryn Clayton RN    8/26/2022
RT made aware of duo neb
TRANSFER - OUT REPORT:    Verbal report given to Earl Campos RN(name) on [employer]  being transferred to [facility](unit) for routine progression of care       Report consisted of patients Situation, Background, Assessment and   Recommendations(SBAR). Information from the following report(s) SBAR and ED Summary was reviewed with the receiving nurse. Lines:   Peripheral IV 08/26/22 Right Antecubital (Active)   Site Assessment Clean, dry, & intact 08/26/22 1357   Phlebitis Assessment 0 08/26/22 1357   Infiltration Assessment 0 08/26/22 1357   Dressing Status Clean, dry, & intact 08/26/22 1357   Dressing Type Tape;Transparent 08/26/22 1357   Hub Color/Line Status Pink;Flushed;Patent 08/26/22 1357   Action Taken Blood drawn 08/26/22 1357        Opportunity for questions and clarification was provided.       Patient transported with:   Monitor  Registered Nurse
Abnormal Lactate: > 2

## 2022-08-26 NOTE — H&P
Hospitalist Admission Note    NAME: Kimberly Jean-Baptiste   :  1945   MRN:  [id number]   Room Number: ER10/10  @ [facility]     Date/Time:  2022 5:40 PM    Patient PCP: Deyvi Baker MD  ______________________________________________________________________  Please note that this dictation was completed with [employer], the computer voice recognition software. Quite often unanticipated grammatical, syntax, homophones, and other interpretive errors are inadvertently transcribed by the computer software. Please disregard these errors. Please excuse any errors that have escaped final proofreading. Given the patient's current clinical presentation, I have a high level of concern for decompensation if discharged from the emergency department. Complex decision making was performed, which includes reviewing the patient's available past medical records, laboratory results, and x-ray films. My assessment of this patient's clinical condition and my plan of care is as follows. Assessment / Plan: Active Problems:    Acute respiratory failure with hypoxia ([address] (2022)      Acute respiratory failure with hypoxia POA due to   COPD Exacerbation POA   sPO2 84% on room air. CXR interpreted independently no consolidation or pleural effusion noted. CTA Chest reviewed - no PE noted, emphysematous changes in lungs, enlarged main pulmonary artery. - continue supplemental oxygen  - Duoneb LASHAWN, PRN  - Methylprednisolone  - COVID, Influenza PCR      Uncontrolled hypertension POA   HLD POA    - resume amlodipine  - holding atenolol  - continue statin      Tobacco dependence POA   - Patient was counseled extensively on the need to abstain from tobacco, its addictive tendencies, its deleterious effects on the lungs, cardiovascular  as well as its financial & social sequelae  - Nicotine patch       GERD POA  - continue PPI      Body mass index is 28.57 kg/m².   Code Status: Full   Surrogate Decision Maker:    DVT Prophylaxis: Lovenox  GI Prophylaxis: not indicated  Baseline: ambulatory with walker         Subjective:   CHIEF COMPLAINT: shortness of breath     HISTORY OF PRESENT ILLNESS:     Garcia Jaffe is a 69 y.o.  female with PMH of CAD, COPD, Arthritis, HLD, HTN who presents to ED with c/o shortness of breath. Patient reported shortness of breath on exertion, wheezing , nonproductive cough, that has been going on for past 3-4 days. Presented to PCP for Check up and was sent ro ER. She was found to have sPO2 84 % on room air in ED triage. Denies fevers, chills, sick contacts. She is vaccinated for COVID. We were asked to admit for work up and evaluation of the above problems. Past Medical History:   Diagnosis Date    Arthritis 2014    CAD (coronary artery disease)     Chronic obstructive pulmonary disease ([address] 2016    Hypercholesterolemia     Hypertension         Past Surgical History:   Procedure Laterality Date    HX HYSTERECTOMY  1983       Social History     Tobacco Use    Smoking status: Every Day     Packs/day: 0.25     Types: Cigarettes    Smokeless tobacco: Never    Tobacco comments:     patient states she has patches   Substance Use Topics    Alcohol use: No        Family History   Problem Relation Age of Onset    Diabetes Mother     Lung Disease Father         Lung cancer    Diabetes Maternal Grandmother     Heart Disease Maternal Grandfather     Arthritis-rheumatoid Paternal Grandmother     Cancer Paternal Grandfather      No Known Allergies     Prior to Admission medications    Medication Sig Start Date End Date Taking? Authorizing Provider   diclofenac (VOLTAREN) 1 % gel Apply  to affected area four (4) times daily. Provider, Historical   metroNIDAZOLE (FLAGYL) 500 mg tablet Take 4 Tabs by mouth once as needed for up to 1 dose.  1/10/17   Hollings, Dorothey Skiff, MD   nystatin-triamcinolone (MYCOLOG II) topical cream Apply  to affected area two (2) times a day. 1/3/17   Yvan Anderson MD   magnesium oxide (MAG-OX) 400 mg tablet TAKE 1 TABLET EVERY DAY 10/6/16   Provider, Historical   traMADol (ULTRAM) 50 mg tablet Take 50 mg by mouth two (2) times daily as needed for Pain. Provider, Historical   linaCLOtide (LINZESS) 145 mcg cap capsule Take  by mouth Daily (before breakfast). Provider, Historical   cholecalciferol, vitamin D3, 50 mcg (2,000 unit) tab Take  by mouth. Provider, Historical   FOLIC ACID/MV,FE,MIN (ONE DAILY FOR WOMEN PO) Take  by mouth daily. Provider, Historical   omeprazole (PRILOSEC) 20 mg capsule Take 20 mg by mouth daily. Provider, Historical   cyanocobalamin (VITAMIN B12) 100 mcg tablet Take 100 mcg by mouth daily. Provider, Historical   atenolol (TENORMIN) 25 mg tablet Take 25 mg by mouth daily. Provider, Historical   atorvastatin (LIPITOR) 20 mg tablet Take 1 tablet by mouth nightly. 8/30/14   Jose Maria Sun MD   amlodipine (NORVASC) 5 mg tablet Take 1 tablet by mouth daily. 8/30/14   Jose Maria Sun MD   lisinopril (PRINIVIL, ZESTRIL) 10 mg tablet Take 1 tablet by mouth daily. 8/30/14   Jose Maria Sun MD   nitroglycerin (NITROSTAT) 0.4 mg SL tablet 1 tablet by SubLINGual route every five (5) minutes as needed for Chest Pain. 8/30/14   Jose Maria Sun MD   gabapentin (NEURONTIN) 600 mg tablet Take 600 mg by mouth four (4) times daily as needed. Provider, Historical   diclofenac EC (VOLTAREN) 75 mg EC tablet Take 75 mg by mouth two (2) times a day. Provider, Historical   aspirin delayed-release 81 mg tablet Take  by mouth daily. Provider, Historical       REVIEW OF SYSTEMS:     I am not able to complete the review of systems because:    The patient is intubated and sedated    The patient has altered mental status due to his acute medical problems    The patient has baseline aphasia from prior stroke(s)    The patient has baseline dementia and is not reliable historian    The patient is in acute medical distress and unable to provide information           Total of 12 systems reviewed as follows:       POSITIVE= underlined text  Negative = text not underlined  General:  fever, chills, sweats, generalized weakness, weight loss/gain,      loss of appetite   Eyes:    blurred vision, eye pain, loss of vision, double vision  ENT:    rhinorrhea, pharyngitis   Respiratory:   cough, sputum production, SOB, SINGLETON, wheezing, pleuritic pain   Cardiology:   chest pain, palpitations, orthopnea, PND, edema, syncope   Gastrointestinal:  abdominal pain , N/V, diarrhea, dysphagia, constipation, bleeding   Genitourinary:  frequency, urgency, dysuria, hematuria, incontinence   Muskuloskeletal :  arthralgia, myalgia, back pain  Hematology:  easy bruising, nose or gum bleeding, lymphadenopathy   Dermatological: rash, ulceration, pruritis, color change / jaundice  Endocrine:   hot flashes or polydipsia   Neurological:  headache, dizziness, confusion, focal weakness, paresthesia,     Speech difficulties, memory loss, gait difficulty  Psychological: Feelings of anxiety, depression, agitation    Objective:   VITALS:    Visit Vitals  BP (!) 155/89   Pulse 95   Temp 99.3 °F (37.4 °C)   Resp 22   Ht 5' 6\" (1.676 m)   Wt 80.3 kg (177 lb)   SpO2 95%   BMI 28.57 kg/m²       PHYSICAL EXAM:    General:    Alert, cooperative, no distress, appears stated age. HEENT: Atraumatic, anicteric sclerae, pink conjunctivae     No oral ulcers, mucosa moist, throat clear, dentition fair  Neck:  Supple, symmetrical,  thyroid: non tender  Lungs:   Bilateral expiratory wheezing. No rales. Chest wall:  No tenderness  No Accessory muscle use. Heart:   Regular  rhythm,  No  murmur   No edema  Abdomen:   Soft, non-tender. Not distended. Bowel sounds normal  Extremities: No cyanosis. No clubbing,      Skin turgor normal, Capillary refill normal, Radial dial pulse 2+  Skin:     Not pale. Not Jaundiced  No rashes   Psych:  Good insight. Not depressed. Not anxious or agitated. Neurologic: EOMs intact. No facial asymmetry. No aphasia or slurred speech. Symmetrical strength, Sensation grossly intact. Alert and oriented X 4.     ______________________________________________________________________    Care Plan discussed with:  Patient/Family, Nurse, and     Expected  Disposition:  Home w/Family  ________________________________________________________________________  TOTAL TIME:  55 Minutes  CRITICAL CARE WAS PERFORMED FOR THIS ENCOUNTER: YES. I had a face to face encounter with the patient, reviewed and interpreted patient data including clinical events, labs, images, vital signs, I/O's, and examined patient. I have discussed the case and the plan and management of the patient's care with the consulting services, the bedside nurses and necessary ancillary providers. This patient has a high probability of imminent, clinically significant deterioration, which requires the highest level of preparedness to intervene urgently. I participated in the decision-making and personally managed or directed the management of the following life and organ supporting interventions that required my frequent assessment to treat or prevent imminent deterioration. I personally spent 35  minutes of critical care time. This is time spent at this critically ill patient's bedside actively involved in patient care as well as the coordination of care and discussions with the patient's family. This does not include any procedural time which has been billed separately.   IMPENDING DETERIORATION -Respiratory  ASSOCIATED RISK FACTORS -Hypoxia, Dysrhythmia, Metabolic changes,  MANAGEMENT- Bedside Assessment and Supervision of Care  INTERPRETATION -  Xrays, CTA Scan, ECG, Blood Pressure, and Cardiac Output Measures   INTERVENTIONS - hemodynamic mngmt and Metobolic interventions  CASE REVIEW - ER/ Consulting services  Nursing, and Family  TREATMENT RESPONSE -Improved  PERFORMED BY - Self    Critical Care Provided  35   Minutes non procedure based      Comments    x Reviewed previous records   >50% of visit spent in counseling and coordination of care x Discussion with patient and/or family and questions answered       ________________________________________________________________________  Signed: Jai Newberry MD    Procedures: see electronic medical records for all procedures/Xrays and details which were not copied into this note but were reviewed prior to creation of Plan. LAB DATA REVIEWED:    Recent Results (from the past 24 hour(s))   EKG, 12 LEAD, INITIAL    Collection Time: 08/26/22  1:33 PM   Result Value Ref Range    Ventricular Rate 89 BPM    Atrial Rate 89 BPM    P-R Interval 162 ms    QRS Duration 130 ms    Q-T Interval 402 ms    QTC Calculation (Bezet) 489 ms    Calculated P Axis 71 degrees    Calculated R Axis -39 degrees    Calculated T Axis -24 degrees    Diagnosis       Normal sinus rhythm  Possible Left atrial enlargement  Left axis deviation  Right bundle branch block  Inferior infarct , age undetermined  Abnormal ECG  No previous ECGs available     CBC WITH AUTOMATED DIFF    Collection Time: 08/26/22  1:34 PM   Result Value Ref Range    WBC 5.8 3.6 - 11.0 K/uL    RBC 5.75 (H) 3.80 - 5.20 M/uL    HGB 15.1 11.5 - 16.0 g/dL    HCT 51.2 (H) 35.0 - 47.0 %    MCV 89.0 80.0 - 99.0 FL    MCH 26.3 26.0 - 34.0 PG    MCHC 29.5 (L) 30.0 - 36.5 g/dL    RDW 18.5 (H) 11.5 - 14.5 %    PLATELET 369 587 - 952 K/uL    MPV 11.7 8.9 - 12.9 FL    NRBC 0.0 0  WBC    ABSOLUTE NRBC 0.00 0.00 - 0.01 K/uL    NEUTROPHILS 44 32 - 75 %    LYMPHOCYTES 33 12 - 49 %    MONOCYTES 18 (H) 5 - 13 %    EOSINOPHILS 4 0 - 7 %    BASOPHILS 1 0 - 1 %    IMMATURE GRANULOCYTES 0 0.0 - 0.5 %    ABS. NEUTROPHILS 2.5 1.8 - 8.0 K/UL    ABS. LYMPHOCYTES 1.9 0.8 - 3.5 K/UL    ABS. MONOCYTES 1.0 0.0 - 1.0 K/UL    ABS. EOSINOPHILS 0.3 0.0 - 0.4 K/UL    ABS. BASOPHILS 0.1 0.0 - 0.1 K/UL    ABS. IMM.  GRANS. 0.0 0.00 - 0.04 K/UL    DF AUTOMATED     METABOLIC PANEL, COMPREHENSIVE    Collection Time: 08/26/22  1:34 PM   Result Value Ref Range    Sodium 141 136 - 145 mmol/L    Potassium 4.0 3.5 - 5.1 mmol/L    Chloride 98 97 - 108 mmol/L    CO2 39 (H) 21 - 32 mmol/L    Anion gap 4 (L) 5 - 15 mmol/L    Glucose 95 65 - 100 mg/dL    BUN 15 6 - 20 MG/DL    Creatinine 0.77 0.55 - 1.02 MG/DL    BUN/Creatinine ratio 19 12 - 20      GFR est AA >60 >60 ml/min/1.73m2    GFR est non-AA >60 >60 ml/min/1.73m2    Calcium 9.1 8.5 - 10.1 MG/DL    Bilirubin, total 1.0 0.2 - 1.0 MG/DL    ALT (SGPT) 23 12 - 78 U/L    AST (SGOT) 19 15 - 37 U/L    Alk.  phosphatase 85 45 - 117 U/L    Protein, total 7.6 6.4 - 8.2 g/dL    Albumin 3.5 3.5 - 5.0 g/dL    Globulin 4.1 (H) 2.0 - 4.0 g/dL    A-G Ratio 0.9 (L) 1.1 - 2.2     TROPONIN-HIGH SENSITIVITY    Collection Time: 08/26/22  1:34 PM   Result Value Ref Range    Troponin-High Sensitivity 32 0 - 51 ng/L   LACTIC ACID    Collection Time: 08/26/22  1:34 PM   Result Value Ref Range    Lactic acid 2.0 0.4 - 2.0 MMOL/L   NT-PRO BNP    Collection Time: 08/26/22  1:34 PM   Result Value Ref Range    NT pro-BNP 1,223 (H) 0 - 450 PG/ML

## 2022-08-27 PROCEDURE — 74011250637 HC RX REV CODE- 250/637: Performed by: STUDENT IN AN ORGANIZED HEALTH CARE EDUCATION/TRAINING PROGRAM

## 2022-08-27 PROCEDURE — 74011000250 HC RX REV CODE- 250: Performed by: STUDENT IN AN ORGANIZED HEALTH CARE EDUCATION/TRAINING PROGRAM

## 2022-08-27 PROCEDURE — 74011250636 HC RX REV CODE- 250/636: Performed by: STUDENT IN AN ORGANIZED HEALTH CARE EDUCATION/TRAINING PROGRAM

## 2022-08-27 PROCEDURE — 65270000032 HC RM SEMIPRIVATE

## 2022-08-27 PROCEDURE — 94640 AIRWAY INHALATION TREATMENT: CPT

## 2022-08-27 PROCEDURE — 77010033678 HC OXYGEN DAILY

## 2022-08-27 PROCEDURE — 94761 N-INVAS EAR/PLS OXIMETRY MLT: CPT

## 2022-08-27 RX ORDER — IPRATROPIUM BROMIDE AND ALBUTEROL SULFATE 2.5; .5 MG/3ML; MG/3ML
3 SOLUTION RESPIRATORY (INHALATION)
Status: DISCONTINUED | OUTPATIENT
Start: 2022-08-27 | End: 2022-08-28

## 2022-08-27 RX ORDER — AMLODIPINE BESYLATE 5 MG/1
10 TABLET ORAL DAILY
Status: DISCONTINUED | OUTPATIENT
Start: 2022-08-28 | End: 2022-08-29 | Stop reason: HOSPADM

## 2022-08-27 RX ORDER — AMLODIPINE BESYLATE 5 MG/1
5 TABLET ORAL ONCE
Status: DISCONTINUED | OUTPATIENT
Start: 2022-08-27 | End: 2022-08-27

## 2022-08-27 RX ADMIN — AMLODIPINE BESYLATE 5 MG: 5 TABLET ORAL at 08:47

## 2022-08-27 RX ADMIN — SODIUM CHLORIDE, PRESERVATIVE FREE 10 ML: 5 INJECTION INTRAVENOUS at 14:00

## 2022-08-27 RX ADMIN — Medication 100 MCG: at 08:47

## 2022-08-27 RX ADMIN — ASPIRIN 81 MG: 81 TABLET, COATED ORAL at 08:47

## 2022-08-27 RX ADMIN — IPRATROPIUM BROMIDE AND ALBUTEROL SULFATE 3 ML: .5; 3 SOLUTION RESPIRATORY (INHALATION) at 14:00

## 2022-08-27 RX ADMIN — METHYLPREDNISOLONE SODIUM SUCCINATE 40 MG: 40 INJECTION, POWDER, FOR SOLUTION INTRAMUSCULAR; INTRAVENOUS at 05:54

## 2022-08-27 RX ADMIN — METHYLPREDNISOLONE SODIUM SUCCINATE 40 MG: 40 INJECTION, POWDER, FOR SOLUTION INTRAMUSCULAR; INTRAVENOUS at 12:24

## 2022-08-27 RX ADMIN — IPRATROPIUM BROMIDE AND ALBUTEROL SULFATE 3 ML: .5; 3 SOLUTION RESPIRATORY (INHALATION) at 20:10

## 2022-08-27 RX ADMIN — DICLOFENAC SODIUM 2 G: 10 GEL TOPICAL at 12:35

## 2022-08-27 RX ADMIN — DICLOFENAC SODIUM 2 G: 10 GEL TOPICAL at 08:48

## 2022-08-27 RX ADMIN — SODIUM CHLORIDE, PRESERVATIVE FREE 10 ML: 5 INJECTION INTRAVENOUS at 21:26

## 2022-08-27 RX ADMIN — METHYLPREDNISOLONE SODIUM SUCCINATE 40 MG: 40 INJECTION, POWDER, FOR SOLUTION INTRAMUSCULAR; INTRAVENOUS at 17:19

## 2022-08-27 RX ADMIN — ATORVASTATIN CALCIUM 20 MG: 10 TABLET, FILM COATED ORAL at 21:25

## 2022-08-27 RX ADMIN — DICLOFENAC SODIUM 2 G: 10 GEL TOPICAL at 21:26

## 2022-08-27 RX ADMIN — SODIUM CHLORIDE, PRESERVATIVE FREE 10 ML: 5 INJECTION INTRAVENOUS at 06:36

## 2022-08-27 RX ADMIN — PANTOPRAZOLE SODIUM 40 MG: 40 TABLET, DELAYED RELEASE ORAL at 08:47

## 2022-08-27 RX ADMIN — ENOXAPARIN SODIUM 40 MG: 100 INJECTION SUBCUTANEOUS at 08:47

## 2022-08-27 RX ADMIN — LISINOPRIL 10 MG: 5 TABLET ORAL at 08:47

## 2022-08-27 RX ADMIN — IPRATROPIUM BROMIDE AND ALBUTEROL SULFATE 3 ML: .5; 3 SOLUTION RESPIRATORY (INHALATION) at 07:56

## 2022-08-27 RX ADMIN — DICLOFENAC SODIUM 2 G: 10 GEL TOPICAL at 18:10

## 2022-08-27 RX ADMIN — METHYLPREDNISOLONE SODIUM SUCCINATE 40 MG: 40 INJECTION, POWDER, FOR SOLUTION INTRAMUSCULAR; INTRAVENOUS at 18:10

## 2022-08-27 NOTE — PROGRESS NOTES
ADULT PROTOCOL: JET AEROSOL ASSESSMENT    Patient  Kira Delgado     69 y.o.   female     8/26/2022  9:40 PM    Breath Sounds Pre Procedure: Right Breath Sounds: Diminished, Posterior (bases)                               Left Breath Sounds: Diminished, Posterior (bases)    Breath Sounds Post Procedure: Right Breath Sounds: Diminished (slight congestion upper airway)                                 Left Breath Sounds: Diminished (slight congestion upper airway)    Breathing pattern: Pre procedure Breathing Pattern: Regular          Post procedure Breathing Pattern: Regular    Heart Rate: Pre procedure Pulse: 90           Post procedure Pulse: 95    Resp Rate: Pre procedure Respirations: 18           Post procedure Respirations: 18    Peak Flow: Pre bronchodilator             Post bronchodilator       FVC/FEV1:  N/A    Incentive Spirometry:             Cough: Pre procedure Cough: Non-productive               Post procedure Cough: Non-productive    Suctioned: NO    Sputum: Pre procedure                   Post procedure      Oxygen: O2 Device: Nasal cannula   Flow rate (L/min) 3L     Changed: YES    SpO2: Pre procedure SpO2: 96 %   with oxygen              Post procedure SpO2: 97 %  with oxygen    Nebulizer Therapy: Current medications Aerosolized Medications: DuoNeb      Changed: NO    Smoking History:    Smoking status: Every Day       Packs/day: 0.25       Types: Cigarettes    Smokeless tobacco: Never    Tobacco comments:       patient states she has patches       Problem List:   Patient Active Problem List   Diagnosis Code    CAD (coronary artery disease) I25.10    SOB (shortness of breath) R06.02    Chest pain at rest R07.9    Decreased pulses in feet R09.89    Essential hypertension I10    Mixed hyperlipidemia E78.2    Abnormal ankle brachial index (DEVAUGHN) R68.89    Claudication (HCC) I73.9    Acute respiratory failure with hypoxia (HCC) J96.01       Respiratory Therapist: Merline Mathieu, RT

## 2022-08-27 NOTE — PROGRESS NOTES
Pt given med's po with ice water and Iv line flushed as ordered. Pt began to feel nauseated,stating that usual. She vomited clear liquid about 60 ml, she is to be given zofran. States she ate dinner earlier and had black beans and tomatoes which she normally done eat but was so hungry she ate it. Vitals have been taken Bp 167/96- - 02 =93%- R-18-  T-98. 2. will notify md

## 2022-08-27 NOTE — PROGRESS NOTES
Pt received in bed she is asleep, rise and fall of chest observed.  Pt has on NC @ 2L, call bell in reach

## 2022-08-27 NOTE — PROGRESS NOTES
Hospitalist Progress Note    NAME: Jill Villanueva   :  1945   MRN:  [id number]   Room Number:  493/18  @ [facility]     Please note that this dictation was completed with [employer], the computer voice recognition software. Quite often unanticipated grammatical, syntax, homophones, and other interpretive errors are inadvertently transcribed by the computer software. Please disregard these errors. Please excuse any errors that have escaped final proofreading. Interim Hospital Summary: 69 y.o. female whom presented on 2022 with      Assessment / Plan:    Acute respiratory failure with hypoxia POA due to   COPD Exacerbation POA   sPO2 84% on room air. CXR interpreted independently no consolidation or pleural effusion noted. CTA Chest reviewed - no PE noted, emphysematous changes in lungs, enlarged main pulmonary artery. COVID, Influenza PCR negative     - continue supplemental oxygen  - Duoneb LASHAWN, PRN  - Methylprednisolone          Uncontrolled hypertension POA   HLD POA     - Amlodipine increase dose   - discontinue nonselective beta blocker  - continue statin        Tobacco dependence POA   - Patient was counseled extensively on the need to abstain from tobacco, its addictive tendencies, its deleterious effects on the lungs, cardiovascular  as well as its financial & social sequelae  - Nicotine patch         GERD POA  - continue PPI        Body mass index is 28.57 kg/m². Code Status: Full   Surrogate Decision Maker:     DVT Prophylaxis: Lovenox  GI Prophylaxis: not indicated  Baseline: ambulatory with walker        Subjective:     Chief Complaint / Reason for Physician Visit  \"Short of breath\". Discussed with RN events overnight. Review of Systems:  +shortness of breath, dyspnea on exertion. No fevers, chills, appetite change, cough, sputum production,  nausea, vomitting, diarrhea, constipation, chest pain, leg edema, abdominal pain, joint pain, rash, itching. Tolerating PT/OT. Tolerating diet. Objective:     VITALS:   Last 24hrs VS reviewed since prior progress note. Most recent are:  Patient Vitals for the past 24 hrs:   Temp Pulse Resp BP SpO2   08/27/22 0833 99.4 °F (37.4 °C) 98 18 (!) 152/85 93 %   08/27/22 0752 -- 96 18 (!) 169/93 97 %   08/27/22 0400 98.7 °F (37.1 °C) (!) 105 18 (!) 154/85 96 %   08/27/22 0001 98.3 °F (36.8 °C) (!) 105 20 (!) 149/76 98 %   08/26/22 2348 -- -- -- -- 93 %   08/26/22 2114 -- -- -- -- 96 %   08/26/22 2029 -- (!) 104 -- -- --   08/26/22 2028 98.2 °F (36.8 °C) 91 20 (!) 151/92 92 %   08/26/22 2010 -- 90 20 (!) 147/92 99 %   08/26/22 1825 -- 92 22 -- 94 %   08/26/22 1810 -- 89 23 -- 94 %   08/26/22 1710 -- 100 21 -- (!) 87 %   08/26/22 1622 -- 95 22 -- 95 %   08/26/22 1620 -- 88 18 -- 93 %   08/26/22 1616 -- 87 21 -- (!) 89 %   08/26/22 1615 -- 90 18 -- (!) 88 %   08/26/22 1610 -- 91 19 -- 92 %   08/26/22 1604 -- 97 21 -- (!) 88 %   08/26/22 1442 -- 92 15 -- 100 %   08/26/22 1433 -- 96 25 -- 96 %   08/26/22 1430 -- 92 18 (!) 155/89 94 %   08/26/22 1400 -- 91 17 (!) 149/91 99 %   08/26/22 1252 -- 96 23 -- 97 %   08/26/22 1233 99.3 °F (37.4 °C) 97 16 (!) 143/86 (!) 84 %     No intake or output data in the 24 hours ending 08/27/22 1028     PHYSICAL EXAM:  General: Alert, cooperative, no acute distress    EENT:  EOMI. Anicteric sclerae. MMM  Resp:  Scattered wheezing. No accessory muscle use  CV:  Regular  rhythm,  normal S1/S2, no murmurs rubs gallops, No edema  GI:  Soft, Non distended, Non tender. +Bowel sounds  Neurologic:  Alert and oriented X 3, normal speech,   Psych:   Good insight. Not anxious nor agitated  Skin:  No rashes.   No jaundice    Reviewed most current lab test results and cultures  YES  Reviewed most current radiology test results   YES  Review and summation of old records today    NO  Reviewed patient's current orders and MAR    YES  PMH/SH reviewed - no change compared to H&P  ________________________________________________________________________  Care Plan discussed with:    Comments   Patient x    Family      RN x    Care Manager x    Consultant                       x Multidiciplinary team rounds were held today with , nursing, pharmacist and clinical coordinator. Patient's plan of care was discussed; medications were reviewed and discharge planning was addressed. ________________________________________________________________________  Total NON critical care TIME:  35   Minutes    Total CRITICAL CARE TIME Spent:   Minutes non procedure based      Comments   >50% of visit spent in counseling and coordination of care x    ________________________________________________________________________  Yaritza Chauhan MD     Procedures: see electronic medical records for all procedures/Xrays and details which were not copied into this note but were reviewed prior to creation of Plan. LABS:  I reviewed today's most current labs and imaging studies.   Pertinent labs include:  Recent Labs     08/26/22  1334   WBC 5.8   HGB 15.1   HCT 51.2*        Recent Labs     08/26/22  1334      K 4.0   CL 98   CO2 39*   GLU 95   BUN 15   CREA 0.77   CA 9.1   ALB 3.5   TBILI 1.0   ALT 23       Signed: Yaritza Chauhan MD

## 2022-08-27 NOTE — PROGRESS NOTES
ADULT PROTOCOL: JET AEROSOL  REASSESSMENT    Patient  Radha Moore     69 y.o.   female     8/27/2022  8:16 AM    Breath Sounds Pre Procedure: Right Breath Sounds: Diminished                               Left Breath Sounds: Diminished    Breath Sounds Post Procedure: Right Breath Sounds: Diminished                                 Left Breath Sounds: Diminished    Breathing pattern: Pre procedure Breathing Pattern: Regular          Post procedure Breathing Pattern: Regular    Heart Rate: Pre procedure Pulse: 99           Post procedure Pulse: 91    Resp Rate: Pre procedure Respirations: 20           Post procedure Respirations: 20      Incentive Spirometry:  Actual Volume (ml): 750 ml      Cough: Pre procedure Cough: Non-productive               Post procedure Cough: Non-productive    Suctioned: NO    Oxygen: O2 Device: Nasal cannula   Flow rate (L/min) 2     Changed: YES    SpO2: Pre procedure SpO2: 97 %   with oxygen              Post procedure SpO2: 100 %  with oxygen    Nebulizer Therapy: Current medications Aerosolized Medications: DuoNeb      Changed: NO    Smoking History: current everyday smoker    Problem List:   Patient Active Problem List   Diagnosis Code    CAD (coronary artery disease) I25.10    SOB (shortness of breath) R06.02    Chest pain at rest R07.9    Decreased pulses in feet R09.89    Essential hypertension I10    Mixed hyperlipidemia E78.2    Abnormal ankle brachial index (DEVAUGHN) R68.89    Claudication (HCC) I73.9    Acute respiratory failure with hypoxia (HCC) J96.01       Respiratory Therapist: Karlie Eric, RT

## 2022-08-27 NOTE — PROGRESS NOTES
[facility] Admission Pharmacy Medication Reconciliation    Information obtained from:  Patient interview, [facility]   RxQuery data available1: yes    Comments/recommendations:    1) The patient was interviewed regarding current PTA medication list, use and drug allergies. 2) Medication changes to PTA list:    Added  Prednisone 20 mg tab #30 (15-day supply)  Metoprolol tartrate 25 mg po BID  Removed  Linaclotide 145 mcg  Atenolol 25 mg  Adjusted  Gabapentin from 600 mg to 800 mg po TID    3) The [place] Prescription Monitoring Program () was accessed to determine fill history of any controlled medications:  Recurring since 9/2020 Gabapentin 800 mg TID #90 last filled 7/4/22  Recurring since 12/2020 Tramadol 50 mg #28 last filled 6/10/22       1RxQuery pharmacy benefit data reflects medications filled and processed through the patient's insurance, however                this data does NOT capture whether the medication was picked up or is currently being taken by the patient. Time spent: 20 minutes    Past Medical History/Disease States:  Past Medical History:   Diagnosis Date    Arthritis 2014    CAD (coronary artery disease)     Chronic obstructive pulmonary disease ([facility] [address] 2016    Hypercholesterolemia     Hypertension          Patient allergies: Allergies as of 08/26/2022    (No Known Allergies)         Prior to Admission Medications   Prescriptions Last Dose Informant  Taking? DULoxetine (CYMBALTA) 60 mg capsule    Yes   Sig: Take 1 Capsule by mouth daily. albuterol (PROVENTIL HFA, VENTOLIN HFA, PROAIR HFA) 90 mcg/actuation inhaler 8/26/2022   Yes   Sig: Take 2 Puffs by inhalation every four (4) hours as needed for Wheezing. alendronate (FOSAMAX) 70 mg tablet 8/19/2022   Yes   Sig: Take 1 Tablet by mouth every seven (7) days. On Thursdays   amlodipine (NORVASC) 5 mg tablet 8/25/2022   Yes   Sig: Take 1 tablet by mouth daily.    aspirin delayed-release 81 mg tablet    Yes   Sig: Take  by mouth daily.   atorvastatin (LIPITOR) 40 mg tablet    Yes   Sig: Take 1 Tablet by mouth daily. azithromycin (ZITHROMAX) 250 mg tablet    Yes   Sig: Take 1 Tablet by mouth daily. diclofenac (VOLTAREN) 1 % gel 2022   Yes   Sig: Apply  to affected area four (4) times daily. fluticasone propion-salmeteroL (ADVAIR/WIXELA) 500-50 mcg/dose diskus inhaler    Yes   Sig: Take 1 Puff by inhalation two (2) times a day. furosemide (LASIX) 20 mg tablet    Yes   Sig: Take 1 Tablet by mouth daily. gabapentin (NEURONTIN) 800 mg tablet    Yes   Sig: Take 1 Tablet by mouth three (3) times daily. lisinopril (PRINIVIL, ZESTRIL) 10 mg tablet    Yes   Sig: Take 1 tablet by mouth daily. metoprolol tartrate (LOPRESSOR) 25 mg tablet    Yes   Sig: Take 1 Tablet by mouth two (2) times a day. nitroglycerin (NITROSTAT) 0.4 mg SL tablet    Yes   Si tablet by SubLINGual route every five (5) minutes as needed for Chest Pain. omeprazole (PRILOSEC) 20 mg capsule 2022   Yes   Sig: Take 20 mg by mouth daily. predniSONE (DELTASONE) 20 mg tablet    Yes   Sig: As directed   traMADol (ULTRAM) 50 mg tablet 2022   Yes   Sig: Take 50 mg by mouth two (2) times daily as needed for Pain.             Thank you,  Monica Aguayo, [facility]

## 2022-08-27 NOTE — ROUTINE PROCESS
Bedside shift change report given to Charlene Matta  (oncoming nurse) by Toro Castro (offgoing nurse). Report included the following information SBAR, Kardex, and MAR.         1220 Perfect serve Dr Felipe Castro, Patients blood pressure is 120/68mmhg, can we hold amlodipine 5mg once? Okayed.

## 2022-08-27 NOTE — PROGRESS NOTES
Pt on breathing tx in which she was just recently put on. stating \" I think it may be the treatments making me feel nauseated\". Pt states she felt fine before starting it. Respiratory therapist when to see patient and patient stated she thinks its the smell of the treatment. Pt did state that it seems to be resolving and its not as bad. Pt has also been given and incentive spirometer by respiratory and she has been shown how to use it, Pt has also been told to cough and deep breath when she is awake at least every two hours per MD orders

## 2022-08-27 NOTE — PROGRESS NOTES
Bedside and Verbal shift change report given to Ramiro Caceres RN (oncoming nurse) by Ezekiel Asencio RN (offgoing nurse). Report included the following information SBAR, Kardex, and MAR.

## 2022-08-27 NOTE — PROGRESS NOTES
Pt  got up to  bathroom without calling, she made it in the bathroom but got stool and urine on the floor. She had to be cleaned with HCG wipes and brief put on. Bed alarm is on and call bell in reach. Pt has been encourage to not get up alone or without assistance.

## 2022-08-27 NOTE — PROGRESS NOTES
Pt awake, tele box adjusted because it had come off, now back on and reading.  Pt in bed, watching tv, call bell in reach

## 2022-08-27 NOTE — PROGRESS NOTES
Bedside and Verbal shift change report given to Rubén Kim RN (oncoming nurse) by Jonathan Doyle RN (offgoing nurse). Report included the following information SBAR, Kardex, and MAR.

## 2022-08-28 LAB
ANION GAP SERPL CALC-SCNC: 1 MMOL/L (ref 5–15)
ATRIAL RATE: 89 BPM
BASOPHILS # BLD: 0 K/UL (ref 0–0.1)
BASOPHILS NFR BLD: 0 % (ref 0–1)
BUN SERPL-MCNC: 14 MG/DL (ref 6–20)
BUN/CREAT SERPL: 19 (ref 12–20)
CALCIUM SERPL-MCNC: 9.2 MG/DL (ref 8.5–10.1)
CALCULATED P AXIS, ECG09: 71 DEGREES
CALCULATED R AXIS, ECG10: -39 DEGREES
CALCULATED T AXIS, ECG11: -24 DEGREES
CHLORIDE SERPL-SCNC: 102 MMOL/L (ref 97–108)
CO2 SERPL-SCNC: 38 MMOL/L (ref 21–32)
CREAT SERPL-MCNC: 0.75 MG/DL (ref 0.55–1.02)
DIAGNOSIS, 93000: NORMAL
DIFFERENTIAL METHOD BLD: ABNORMAL
EOSINOPHIL # BLD: 0 K/UL (ref 0–0.4)
EOSINOPHIL NFR BLD: 0 % (ref 0–7)
ERYTHROCYTE [DISTWIDTH] IN BLOOD BY AUTOMATED COUNT: 18.2 % (ref 11.5–14.5)
GLUCOSE SERPL-MCNC: 172 MG/DL (ref 65–100)
HCT VFR BLD AUTO: 48.4 % (ref 35–47)
HGB BLD-MCNC: 14.1 G/DL (ref 11.5–16)
IMM GRANULOCYTES # BLD AUTO: 0 K/UL (ref 0–0.04)
IMM GRANULOCYTES NFR BLD AUTO: 0 % (ref 0–0.5)
LYMPHOCYTES # BLD: 0.5 K/UL (ref 0.8–3.5)
LYMPHOCYTES NFR BLD: 7 % (ref 12–49)
MAGNESIUM SERPL-MCNC: 2 MG/DL (ref 1.6–2.4)
MCH RBC QN AUTO: 26.4 PG (ref 26–34)
MCHC RBC AUTO-ENTMCNC: 29.1 G/DL (ref 30–36.5)
MCV RBC AUTO: 90.5 FL (ref 80–99)
MONOCYTES # BLD: 0.6 K/UL (ref 0–1)
MONOCYTES NFR BLD: 9 % (ref 5–13)
NEUTS SEG # BLD: 5.4 K/UL (ref 1.8–8)
NEUTS SEG NFR BLD: 84 % (ref 32–75)
NRBC # BLD: 0.02 K/UL (ref 0–0.01)
NRBC BLD-RTO: 0.3 PER 100 WBC
P-R INTERVAL, ECG05: 162 MS
PHOSPHATE SERPL-MCNC: 4.6 MG/DL (ref 2.6–4.7)
PLATELET # BLD AUTO: 199 K/UL (ref 150–400)
PMV BLD AUTO: 12.1 FL (ref 8.9–12.9)
POTASSIUM SERPL-SCNC: 5.1 MMOL/L (ref 3.5–5.1)
Q-T INTERVAL, ECG07: 402 MS
QRS DURATION, ECG06: 130 MS
QTC CALCULATION (BEZET), ECG08: 489 MS
RBC # BLD AUTO: 5.35 M/UL (ref 3.8–5.2)
RBC MORPH BLD: ABNORMAL
SODIUM SERPL-SCNC: 141 MMOL/L (ref 136–145)
VENTRICULAR RATE, ECG03: 89 BPM
WBC # BLD AUTO: 6.5 K/UL (ref 3.6–11)

## 2022-08-28 PROCEDURE — 94761 N-INVAS EAR/PLS OXIMETRY MLT: CPT

## 2022-08-28 PROCEDURE — 83735 ASSAY OF MAGNESIUM: CPT

## 2022-08-28 PROCEDURE — 65270000032 HC RM SEMIPRIVATE

## 2022-08-28 PROCEDURE — 84100 ASSAY OF PHOSPHORUS: CPT

## 2022-08-28 PROCEDURE — 36415 COLL VENOUS BLD VENIPUNCTURE: CPT

## 2022-08-28 PROCEDURE — 74011000250 HC RX REV CODE- 250: Performed by: STUDENT IN AN ORGANIZED HEALTH CARE EDUCATION/TRAINING PROGRAM

## 2022-08-28 PROCEDURE — 94640 AIRWAY INHALATION TREATMENT: CPT

## 2022-08-28 PROCEDURE — 85025 COMPLETE CBC W/AUTO DIFF WBC: CPT

## 2022-08-28 PROCEDURE — 74011250637 HC RX REV CODE- 250/637: Performed by: STUDENT IN AN ORGANIZED HEALTH CARE EDUCATION/TRAINING PROGRAM

## 2022-08-28 PROCEDURE — 80048 BASIC METABOLIC PNL TOTAL CA: CPT

## 2022-08-28 PROCEDURE — 74011250636 HC RX REV CODE- 250/636: Performed by: STUDENT IN AN ORGANIZED HEALTH CARE EDUCATION/TRAINING PROGRAM

## 2022-08-28 PROCEDURE — 77010033678 HC OXYGEN DAILY

## 2022-08-28 RX ORDER — IPRATROPIUM BROMIDE AND ALBUTEROL SULFATE 2.5; .5 MG/3ML; MG/3ML
3 SOLUTION RESPIRATORY (INHALATION)
Status: DISCONTINUED | OUTPATIENT
Start: 2022-08-28 | End: 2022-08-29 | Stop reason: HOSPADM

## 2022-08-28 RX ADMIN — METHYLPREDNISOLONE SODIUM SUCCINATE 40 MG: 40 INJECTION, POWDER, FOR SOLUTION INTRAMUSCULAR; INTRAVENOUS at 17:08

## 2022-08-28 RX ADMIN — DICLOFENAC SODIUM 2 G: 10 GEL TOPICAL at 17:08

## 2022-08-28 RX ADMIN — ASPIRIN 81 MG: 81 TABLET, COATED ORAL at 08:11

## 2022-08-28 RX ADMIN — SODIUM CHLORIDE, PRESERVATIVE FREE 10 ML: 5 INJECTION INTRAVENOUS at 06:07

## 2022-08-28 RX ADMIN — METHYLPREDNISOLONE SODIUM SUCCINATE 40 MG: 40 INJECTION, POWDER, FOR SOLUTION INTRAMUSCULAR; INTRAVENOUS at 06:07

## 2022-08-28 RX ADMIN — LISINOPRIL 10 MG: 5 TABLET ORAL at 08:10

## 2022-08-28 RX ADMIN — ENOXAPARIN SODIUM 40 MG: 100 INJECTION SUBCUTANEOUS at 08:10

## 2022-08-28 RX ADMIN — METHYLPREDNISOLONE SODIUM SUCCINATE 40 MG: 40 INJECTION, POWDER, FOR SOLUTION INTRAMUSCULAR; INTRAVENOUS at 12:44

## 2022-08-28 RX ADMIN — IPRATROPIUM BROMIDE AND ALBUTEROL SULFATE 3 ML: .5; 3 SOLUTION RESPIRATORY (INHALATION) at 20:07

## 2022-08-28 RX ADMIN — DICLOFENAC SODIUM 2 G: 10 GEL TOPICAL at 12:48

## 2022-08-28 RX ADMIN — ATORVASTATIN CALCIUM 20 MG: 10 TABLET, FILM COATED ORAL at 21:37

## 2022-08-28 RX ADMIN — METHYLPREDNISOLONE SODIUM SUCCINATE 40 MG: 40 INJECTION, POWDER, FOR SOLUTION INTRAMUSCULAR; INTRAVENOUS at 00:40

## 2022-08-28 RX ADMIN — IPRATROPIUM BROMIDE AND ALBUTEROL SULFATE 3 ML: .5; 3 SOLUTION RESPIRATORY (INHALATION) at 07:47

## 2022-08-28 RX ADMIN — Medication 100 MCG: at 08:11

## 2022-08-28 RX ADMIN — AMLODIPINE BESYLATE 10 MG: 5 TABLET ORAL at 08:10

## 2022-08-28 RX ADMIN — SODIUM CHLORIDE, PRESERVATIVE FREE 10 ML: 5 INJECTION INTRAVENOUS at 14:00

## 2022-08-28 RX ADMIN — IPRATROPIUM BROMIDE AND ALBUTEROL SULFATE 3 ML: .5; 3 SOLUTION RESPIRATORY (INHALATION) at 14:12

## 2022-08-28 RX ADMIN — METHYLPREDNISOLONE SODIUM SUCCINATE 40 MG: 40 INJECTION, POWDER, FOR SOLUTION INTRAMUSCULAR; INTRAVENOUS at 23:54

## 2022-08-28 RX ADMIN — DICLOFENAC SODIUM 2 G: 10 GEL TOPICAL at 21:40

## 2022-08-28 RX ADMIN — PANTOPRAZOLE SODIUM 40 MG: 40 TABLET, DELAYED RELEASE ORAL at 08:11

## 2022-08-28 RX ADMIN — DICLOFENAC SODIUM 2 G: 10 GEL TOPICAL at 08:13

## 2022-08-28 RX ADMIN — SODIUM CHLORIDE, PRESERVATIVE FREE 10 ML: 5 INJECTION INTRAVENOUS at 21:38

## 2022-08-28 NOTE — PROGRESS NOTES
Pt awake assisted to bathroom, she is now back to bed, denies needing anything at this time, pt has in 2L via NC, call bell in reach, bed alarm on.

## 2022-08-28 NOTE — PROGRESS NOTES
ADULT PROTOCOL: JET AEROSOL  REASSESSMENT    Patient  Tanya Sweeney     69 y.o.   female     8/28/2022  8:02 AM    Breath Sounds Pre Procedure: Right Breath Sounds: Diminished                               Left Breath Sounds: Diminished    Breath Sounds Post Procedure: Right Breath Sounds: Diminished                                 Left Breath Sounds: Diminished    Breathing pattern: Pre procedure Breathing Pattern: Regular          Post procedure Breathing Pattern: Regular    Heart Rate: Pre procedure Pulse: 98           Post procedure Pulse: 84    Resp Rate: Pre procedure Respirations: 18           Post procedure Respirations: 18      Incentive Spirometry:  Actual Volume (ml): 750 ml      Cough: Pre procedure Cough: Non-productive               Post procedure Cough: Non-productive    Suctioned: NO      Oxygen: O2 Device: Nasal cannula   3L     Changed: NO    SpO2: Pre procedure SpO2: 94 %   with oxygen              Post procedure SpO2: 97 %  with oxygen    Nebulizer Therapy: Current medications Aerosolized Medications: DuoNeb      Changed: NO    Smoking History: current everyday smoker    Problem List:   Patient Active Problem List   Diagnosis Code    CAD (coronary artery disease) I25.10    SOB (shortness of breath) R06.02    Chest pain at rest R07.9    Decreased pulses in feet R09.89    Essential hypertension I10    Mixed hyperlipidemia E78.2    Abnormal ankle brachial index (DEVAUGHN) R68.89    Claudication (HCC) I73.9    Acute respiratory failure with hypoxia (HCC) J96.01       Respiratory Therapist: Karlie Eric, RT

## 2022-08-28 NOTE — PROGRESS NOTES
Transition of Care Plan:  *RUR: 9%     * Disposition- Home  * Transportation at Discharge: TBD  * IM/MOONS/OBS/FOC letter:  2nd IMM  * Appointment with PCP Vale CANCHOLA reviewed chart. CM completed assessment with pt at bedside. Pt is alert and oriented throughout encounter. CM introduced self/role, verified demographics, and discussed discharge planning. Patient sleepy doing assessment she was able to inform SUSHANT she has a follow-up with Vale but does not know the date and time.     [address] [facility], [address] [phone]

## 2022-08-28 NOTE — PROGRESS NOTES
Bedside and Verbal shift change report given to Qamar Low RN (oncoming nurse) by Brenda Alvarez RN (offgoing nurse). Report included the following information SBAR, Kardex, and MAR.

## 2022-08-28 NOTE — PROGRESS NOTES
Problem: Breathing Pattern - Ineffective  Goal: *Absence of hypoxia  Outcome: Progressing Towards Goal     Problem: Falls - Risk of  Goal: *Absence of Falls  Description: Document Jaz Fall Risk and appropriate interventions in the flowsheet.   Outcome: Progressing Towards Goal  Note: Fall Risk Interventions:  Mobility Interventions: Patient to call before getting OOB         Medication Interventions: Teach patient to arise slowly    Elimination Interventions: Call light in reach, Bed/chair exit alarm    History of Falls Interventions: Door open when patient unattended, Investigate reason for fall

## 2022-08-28 NOTE — PROGRESS NOTES
Pt awake and alert, sitting up in bed Nc@ 3L, pt denies needing anything at this time, bed alarm on,call bell in reach.

## 2022-08-28 NOTE — PROGRESS NOTES
Hospitalist Progress Note    NAME: Brian Sky   :  1945   MRN:  [id number]   Room Number:  536/40  @ [facility]     Please note that this dictation was completed with Meghan Horowitz, the computer voice recognition software. Quite often unanticipated grammatical, syntax, homophones, and other interpretive errors are inadvertently transcribed by the computer software. Please disregard these errors. Please excuse any errors that have escaped final proofreading. Interim Hospital Summary: 69 y.o. female whom presented on 2022 with      Assessment / Plan:  Anticipated discharge date : 1-2 days  Anticipated disposition : Home   Barriers to discharge : medical stability     Acute respiratory failure with hypoxia POA due to   COPD Exacerbation POA   sPO2 84% on room air. CXR interpreted independently no consolidation or pleural effusion noted. CTA Chest reviewed - no PE noted, emphysematous changes in lungs, enlarged main pulmonary artery. COVID, Influenza PCR negative     - continue supplemental oxygen  - Duoneb LASHAWN, PRN  - Methylprednisolone           Uncontrolled hypertension POA   HLD POA     - Amlodipine increase dose   - discontinue nonselective beta blocker  - continue statin        Tobacco dependence POA   - Patient was counseled extensively on the need to abstain from tobacco, its addictive tendencies, its deleterious effects on the lungs, cardiovascular  as well as its financial & social sequelae  - Nicotine patch         GERD POA  - continue PPI        Body mass index is 28.57 kg/m². Code Status: Full   Surrogate Decision Maker:     DVT Prophylaxis: Lovenox  GI Prophylaxis: not indicated  Baseline: ambulatory with walker             Subjective:     Chief Complaint / Reason for Physician Visit  \"Doing ok\". Discussed with RN events overnight.      Review of Systems:  No fevers, chills, appetite change, cough, sputum production, shortness of breath, dyspnea on exertion, nausea, vomitting, diarrhea, constipation, chest pain, leg edema, abdominal pain, joint pain, rash, itching. Tolerating PT/OT. Tolerating diet. Objective:     VITALS:   Last 24hrs VS reviewed since prior progress note. Most recent are:  Patient Vitals for the past 24 hrs:   Temp Pulse Resp BP SpO2   08/28/22 1123 98.5 °F (36.9 °C) 91 16 109/71 (!) 85 %   08/28/22 0812 -- 95 -- 127/85 --   08/28/22 0745 -- -- -- -- 94 %   08/28/22 0726 98.1 °F (36.7 °C) 96 16 122/82 91 %   08/28/22 0400 97.6 °F (36.4 °C) 90 16 121/75 94 %   08/28/22 0000 97.3 °F (36.3 °C) 86 18 127/69 94 %   08/27/22 2019 98.6 °F (37 °C) 95 20 120/62 93 %   08/27/22 2000 -- 95 -- -- --   08/27/22 1548 98.6 °F (37 °C) (!) 102 -- 118/68 (!) 88 %   08/27/22 1428 -- -- -- -- (!) 88 %   08/27/22 1404 -- -- -- -- 97 %   08/27/22 1216 98.4 °F (36.9 °C) 93 20 120/68 93 %   08/27/22 1200 -- 94 -- -- --     No intake or output data in the 24 hours ending 08/28/22 1126     PHYSICAL EXAM:  General: Alert, cooperative, no acute distress    EENT:  EOMI. Anicteric sclerae. MMM  Resp:  Scattered expiratory wheezing. No accessory muscle use  CV:  Regular  rhythm,  normal S1/S2, no murmurs rubs gallops, No edema  GI:  Soft, Non distended, Non tender. +Bowel sounds  Neurologic:  Alert and oriented X 3, normal speech,   Psych:   Good insight. Not anxious nor agitated  Skin:  No rashes. No jaundice    Reviewed most current lab test results and cultures  YES  Reviewed most current radiology test results   YES  Review and summation of old records today    NO  Reviewed patient's current orders and MAR    YES  PMH/SH reviewed - no change compared to H&P  ________________________________________________________________________  Care Plan discussed with:    Comments   Patient x    Family      RN x    Care Manager x    Consultant                       x Multidiciplinary team rounds were held today with , nursing, pharmacist and clinical coordinator. Patient's plan of care was discussed; medications were reviewed and discharge planning was addressed. ________________________________________________________________________  Total NON critical care TIME:  35   Minutes    Total CRITICAL CARE TIME Spent:   Minutes non procedure based      Comments   >50% of visit spent in counseling and coordination of care x    ________________________________________________________________________  Adela Gifford MD     Procedures: see electronic medical records for all procedures/Xrays and details which were not copied into this note but were reviewed prior to creation of Plan. LABS:  I reviewed today's most current labs and imaging studies.   Pertinent labs include:  Recent Labs     08/28/22  0349 08/26/22  1334   WBC 6.5 5.8   HGB 14.1 15.1   HCT 48.4* 51.2*    157     Recent Labs     08/28/22  0349 08/26/22  1334    141   K 5.1 4.0    98   CO2 38* 39*   * 95   BUN 14 15   CREA 0.75 0.77   CA 9.2 9.1   MG 2.0  --    PHOS 4.6  --    ALB  --  3.5   TBILI  --  1.0   ALT  --  23       Signed: Adela Gifford MD

## 2022-08-28 NOTE — PROGRESS NOTES
Bedside shift change report given to [address] [place] [address] Nw) by Jonnie La (offgoing nurse). Report included the following information SBAR, Kardex, and MAR.       1328 Perfect served, Patient unable to be above 90% while on 1 lit 02, ranging between 87 and 89, saturation 90-91% while on 2 lits.

## 2022-08-29 VITALS
DIASTOLIC BLOOD PRESSURE: 72 MMHG | RESPIRATION RATE: 20 BRPM | HEIGHT: 66 IN | HEART RATE: 86 BPM | SYSTOLIC BLOOD PRESSURE: 142 MMHG | OXYGEN SATURATION: 90 % | WEIGHT: 179.3 LBS | TEMPERATURE: 98.2 F | BODY MASS INDEX: 28.82 KG/M2

## 2022-08-29 LAB
ALBUMIN SERPL-MCNC: 3 G/DL (ref 3.5–5)
ALBUMIN/GLOB SERPL: 0.8 {RATIO} (ref 1.1–2.2)
ALP SERPL-CCNC: 68 U/L (ref 45–117)
ALT SERPL-CCNC: 35 U/L (ref 12–78)
ANION GAP SERPL CALC-SCNC: 4 MMOL/L (ref 5–15)
AST SERPL-CCNC: 15 U/L (ref 15–37)
BASOPHILS # BLD: 0 K/UL (ref 0–0.1)
BASOPHILS NFR BLD: 0 % (ref 0–1)
BILIRUB DIRECT SERPL-MCNC: 0.2 MG/DL (ref 0–0.2)
BILIRUB SERPL-MCNC: 0.6 MG/DL (ref 0.2–1)
BUN SERPL-MCNC: 22 MG/DL (ref 6–20)
BUN/CREAT SERPL: 30 (ref 12–20)
CALCIUM SERPL-MCNC: 9 MG/DL (ref 8.5–10.1)
CHLORIDE SERPL-SCNC: 99 MMOL/L (ref 97–108)
CO2 SERPL-SCNC: 37 MMOL/L (ref 21–32)
CREAT SERPL-MCNC: 0.74 MG/DL (ref 0.55–1.02)
DIFFERENTIAL METHOD BLD: ABNORMAL
EOSINOPHIL # BLD: 0 K/UL (ref 0–0.4)
EOSINOPHIL NFR BLD: 0 % (ref 0–7)
ERYTHROCYTE [DISTWIDTH] IN BLOOD BY AUTOMATED COUNT: 18.1 % (ref 11.5–14.5)
EST. AVERAGE GLUCOSE BLD GHB EST-MCNC: 180 MG/DL
GLOBULIN SER CALC-MCNC: 3.6 G/DL (ref 2–4)
GLUCOSE BLD STRIP.AUTO-MCNC: 311 MG/DL (ref 65–117)
GLUCOSE SERPL-MCNC: 265 MG/DL (ref 65–100)
HBA1C MFR BLD: 7.9 % (ref 4–5.6)
HCT VFR BLD AUTO: 46.8 % (ref 35–47)
HGB BLD-MCNC: 14 G/DL (ref 11.5–16)
IMM GRANULOCYTES # BLD AUTO: 0 K/UL (ref 0–0.04)
IMM GRANULOCYTES NFR BLD AUTO: 0 % (ref 0–0.5)
LYMPHOCYTES # BLD: 0.4 K/UL (ref 0.8–3.5)
LYMPHOCYTES NFR BLD: 4 % (ref 12–49)
MAGNESIUM SERPL-MCNC: 1.9 MG/DL (ref 1.6–2.4)
MCH RBC QN AUTO: 26.8 PG (ref 26–34)
MCHC RBC AUTO-ENTMCNC: 29.9 G/DL (ref 30–36.5)
MCV RBC AUTO: 89.5 FL (ref 80–99)
MONOCYTES # BLD: 0.5 K/UL (ref 0–1)
MONOCYTES NFR BLD: 6 % (ref 5–13)
NEUTS SEG # BLD: 8.2 K/UL (ref 1.8–8)
NEUTS SEG NFR BLD: 90 % (ref 32–75)
NRBC # BLD: 0.02 K/UL (ref 0–0.01)
NRBC BLD-RTO: 0.2 PER 100 WBC
PHOSPHATE SERPL-MCNC: 2.6 MG/DL (ref 2.6–4.7)
PLATELET # BLD AUTO: 207 K/UL (ref 150–400)
PMV BLD AUTO: 11.6 FL (ref 8.9–12.9)
POTASSIUM SERPL-SCNC: 4.4 MMOL/L (ref 3.5–5.1)
PROT SERPL-MCNC: 6.6 G/DL (ref 6.4–8.2)
RBC # BLD AUTO: 5.23 M/UL (ref 3.8–5.2)
RBC MORPH BLD: ABNORMAL
SERVICE CMNT-IMP: ABNORMAL
SODIUM SERPL-SCNC: 140 MMOL/L (ref 136–145)
WBC # BLD AUTO: 9.1 K/UL (ref 3.6–11)

## 2022-08-29 PROCEDURE — 83036 HEMOGLOBIN GLYCOSYLATED A1C: CPT

## 2022-08-29 PROCEDURE — 74011250637 HC RX REV CODE- 250/637: Performed by: STUDENT IN AN ORGANIZED HEALTH CARE EDUCATION/TRAINING PROGRAM

## 2022-08-29 PROCEDURE — 36415 COLL VENOUS BLD VENIPUNCTURE: CPT

## 2022-08-29 PROCEDURE — 94640 AIRWAY INHALATION TREATMENT: CPT

## 2022-08-29 PROCEDURE — 80048 BASIC METABOLIC PNL TOTAL CA: CPT

## 2022-08-29 PROCEDURE — 80076 HEPATIC FUNCTION PANEL: CPT

## 2022-08-29 PROCEDURE — 74011636637 HC RX REV CODE- 636/637: Performed by: STUDENT IN AN ORGANIZED HEALTH CARE EDUCATION/TRAINING PROGRAM

## 2022-08-29 PROCEDURE — 74011250636 HC RX REV CODE- 250/636: Performed by: STUDENT IN AN ORGANIZED HEALTH CARE EDUCATION/TRAINING PROGRAM

## 2022-08-29 PROCEDURE — 84100 ASSAY OF PHOSPHORUS: CPT

## 2022-08-29 PROCEDURE — 74011000250 HC RX REV CODE- 250: Performed by: STUDENT IN AN ORGANIZED HEALTH CARE EDUCATION/TRAINING PROGRAM

## 2022-08-29 PROCEDURE — 82962 GLUCOSE BLOOD TEST: CPT

## 2022-08-29 PROCEDURE — 85025 COMPLETE CBC W/AUTO DIFF WBC: CPT

## 2022-08-29 PROCEDURE — 77010033678 HC OXYGEN DAILY

## 2022-08-29 PROCEDURE — 83735 ASSAY OF MAGNESIUM: CPT

## 2022-08-29 RX ORDER — DEXTROSE 50 % IN WATER (D50W) INTRAVENOUS SYRINGE
25-50 AS NEEDED
Status: DISCONTINUED | OUTPATIENT
Start: 2022-08-29 | End: 2022-08-29 | Stop reason: HOSPADM

## 2022-08-29 RX ORDER — MAGNESIUM SULFATE 100 %
4 CRYSTALS MISCELLANEOUS AS NEEDED
Status: DISCONTINUED | OUTPATIENT
Start: 2022-08-29 | End: 2022-08-29 | Stop reason: HOSPADM

## 2022-08-29 RX ORDER — FUROSEMIDE 20 MG/1
20 TABLET ORAL DAILY
Status: DISCONTINUED | OUTPATIENT
Start: 2022-08-29 | End: 2022-08-29 | Stop reason: HOSPADM

## 2022-08-29 RX ORDER — ATORVASTATIN CALCIUM 40 MG/1
40 TABLET, FILM COATED ORAL DAILY
Status: DISCONTINUED | OUTPATIENT
Start: 2022-08-29 | End: 2022-08-29 | Stop reason: HOSPADM

## 2022-08-29 RX ORDER — IPRATROPIUM BROMIDE AND ALBUTEROL SULFATE 2.5; .5 MG/3ML; MG/3ML
SOLUTION RESPIRATORY (INHALATION)
Qty: 30 NEBULE | Refills: 0 | Status: SHIPPED | OUTPATIENT
Start: 2022-08-29

## 2022-08-29 RX ORDER — METOPROLOL TARTRATE 25 MG/1
25 TABLET, FILM COATED ORAL 2 TIMES DAILY
Status: DISCONTINUED | OUTPATIENT
Start: 2022-08-29 | End: 2022-08-29 | Stop reason: HOSPADM

## 2022-08-29 RX ORDER — DULOXETIN HYDROCHLORIDE 60 MG/1
60 CAPSULE, DELAYED RELEASE ORAL DAILY
Status: DISCONTINUED | OUTPATIENT
Start: 2022-08-29 | End: 2022-08-29 | Stop reason: HOSPADM

## 2022-08-29 RX ORDER — PREDNISONE 20 MG/1
40 TABLET ORAL
Qty: 10 TABLET | Refills: 0 | Status: SHIPPED | OUTPATIENT
Start: 2022-08-29 | End: 2022-09-03

## 2022-08-29 RX ORDER — INSULIN GLARGINE 100 [IU]/ML
0.2 INJECTION, SOLUTION SUBCUTANEOUS DAILY
Status: DISCONTINUED | OUTPATIENT
Start: 2022-08-29 | End: 2022-08-29 | Stop reason: HOSPADM

## 2022-08-29 RX ORDER — INSULIN LISPRO 100 [IU]/ML
INJECTION, SOLUTION INTRAVENOUS; SUBCUTANEOUS
Status: DISCONTINUED | OUTPATIENT
Start: 2022-08-29 | End: 2022-08-29 | Stop reason: HOSPADM

## 2022-08-29 RX ADMIN — Medication 100 MCG: at 09:01

## 2022-08-29 RX ADMIN — METOPROLOL TARTRATE 25 MG: 25 TABLET, FILM COATED ORAL at 12:22

## 2022-08-29 RX ADMIN — DICLOFENAC SODIUM 2 G: 10 GEL TOPICAL at 09:01

## 2022-08-29 RX ADMIN — IPRATROPIUM BROMIDE AND ALBUTEROL SULFATE 3 ML: .5; 3 SOLUTION RESPIRATORY (INHALATION) at 08:20

## 2022-08-29 RX ADMIN — GABAPENTIN 800 MG: 100 CAPSULE ORAL at 12:22

## 2022-08-29 RX ADMIN — FUROSEMIDE 20 MG: 20 TABLET ORAL at 12:22

## 2022-08-29 RX ADMIN — ATORVASTATIN CALCIUM 40 MG: 40 TABLET, FILM COATED ORAL at 12:22

## 2022-08-29 RX ADMIN — METHYLPREDNISOLONE SODIUM SUCCINATE 40 MG: 40 INJECTION, POWDER, FOR SOLUTION INTRAMUSCULAR; INTRAVENOUS at 05:54

## 2022-08-29 RX ADMIN — ENOXAPARIN SODIUM 40 MG: 100 INJECTION SUBCUTANEOUS at 09:01

## 2022-08-29 RX ADMIN — DULOXETINE HYDROCHLORIDE 60 MG: 60 CAPSULE, DELAYED RELEASE ORAL at 12:22

## 2022-08-29 RX ADMIN — SODIUM CHLORIDE, PRESERVATIVE FREE 10 ML: 5 INJECTION INTRAVENOUS at 05:54

## 2022-08-29 RX ADMIN — Medication 16 UNITS: at 12:40

## 2022-08-29 RX ADMIN — LISINOPRIL 10 MG: 5 TABLET ORAL at 09:00

## 2022-08-29 RX ADMIN — Medication 7 UNITS: at 12:41

## 2022-08-29 RX ADMIN — ASPIRIN 81 MG: 81 TABLET, COATED ORAL at 09:00

## 2022-08-29 RX ADMIN — DICLOFENAC SODIUM 2 G: 10 GEL TOPICAL at 13:00

## 2022-08-29 RX ADMIN — METHYLPREDNISOLONE SODIUM SUCCINATE 40 MG: 40 INJECTION, POWDER, FOR SOLUTION INTRAMUSCULAR; INTRAVENOUS at 12:22

## 2022-08-29 RX ADMIN — PANTOPRAZOLE SODIUM 40 MG: 40 TABLET, DELAYED RELEASE ORAL at 09:00

## 2022-08-29 RX ADMIN — AMLODIPINE BESYLATE 10 MG: 5 TABLET ORAL at 09:01

## 2022-08-29 NOTE — PROGRESS NOTES
Face to Face Order for DME          Pt Name  Tanya Sweeney   Date of Birth 1945   Age  69 y.o. Medical Record Number  [id number]   Room Number  563/73   Admit date:  8/26/2022   Date of Face to Face: 8/28/2022     Admission Diagnosis:  COPD Exacerbation        Diagnoses   Present on Admission:  COPD Exacerbation      Past Medical History:   Diagnosis Date    Arthritis 2014    CAD (coronary artery disease)     Chronic obstructive pulmonary disease ([facility] [address] 2016    Hypercholesterolemia     Hypertension       Visit Vitals  BP (!) 160/99 (BP 1 Location: Left upper arm, BP Patient Position: At rest;Sitting)   Pulse 87   Temp 98.1 °F (36.7 °C)   Resp 18   Ht 5' 6\" (1.676 m)   Wt 81.3 kg (179 lb 4.8 oz)   SpO2 97%   Breastfeeding No   BMI 28.94 kg/m²         No Known Allergies         I certify that the patient needs DME as prescribed below and I will not be following this patient in the Community. Dr. Robby Garcia MD will be responsible for signing the Plan of Care and will follow/coordinate ongoing care. Initial Orders for Care:  Nebulizer and supplies   Report to Nohemi Presley MD    I certify that I am taking care of the patient today (Please see hospital Discharge Records for details of clinical issues pertaining to this order).       ________________________________________________________________________  Pepe Miller MD

## 2022-08-29 NOTE — PROGRESS NOTES
[facility]       8/29/2022      RE: Esau Palafox      To Whom it May Concern: This is to certify that Esau Palafox was admitted to hospital on 8/26/2022   to 8/29/2022. It is recommended that she not be exposed to second hand cigarette smoke due to her health condition. Thank you for your assistance in this matter.     Sincerely,      Adela Gifford MD

## 2022-08-29 NOTE — PROGRESS NOTES
Bedside and Verbal shift change report given to [address] (oncoming nurse) by Gelacio Valencia (offgoing nurse). Report included the following information SBAR, Kardex, Intake/Output, MAR, Recent Results, and Cardiac Rhythm SR,ST w/ occasional PVC's. Patti Weiss

## 2022-08-29 NOTE — PROGRESS NOTES
ASUNCION     RUR  10 %     IDR this am with MD and team   JOSIANE discharge later today  May need Home Oxygne. Six min test to be done. Met with patient this am to complete the discharge plan. Verified home address - on face sheet correct . [address]. Lives alone . She said her son is her emergency contact and Legal [employer]K. she said he drives a truck and not available to talk to at this time or provide transportation. She was working 2 days a week- but indicates now may not be able to continue. Concerned about finances. Her rent 290.00. she has life insurance 110.00 per month- than cost of food. Asked about her  SS she said it is under 700.00. Never applied for Medicaid. Asked her to consider this now and SNAP. Talked about PCP and follow-up. She goes to [facility] and they provide transportation to appointments   Asked about pharmacy new meds to [facility] - normally get meds she indicates [facility] [place].     She will need ride home- to arrange via [address]. [place]- PCP on AV[place] and asked them to check Sats in the office. She did not qualify for Oxygen at this time. She indicates glad not to need it - but may need in the future. Discussed Second IMM letter. Signed. Copy on chart. In agreement to discharge plan.  these medications from [facility]/pharmacy #[id number]- [place], [place] - [address]  [address]  albuterol-ipratropium  predniSONE     Medicaid  Next steps: Follow up  Consider applying as we discussed especially if not able to work the 12 hours per week- and your indicate the amount of your multiBIND biotecly income.    Also applying for SNAP    Follow up with Ashlee Delgado MD on 8/30/2022  Specialty: Internal Medicine Physician  [address] [address]   [phone]  PCP appointment 8-30-22 @ 11:00AM- they will pick you up at 10:30AM-   the office needs to check you Sats again in the office    Care Management Interventions  PCP Verified by CM: Yes  Mode of Transport at Discharge: Self  Transition of Care Consult (CM Consult): Discharge Planning  Health Maintenance Reviewed: Yes  Support Systems: Child(kristina)  Confirm Follow Up Transport: Self  The Patient and/or Patient Representative was Provided with a Choice of Provider and Agrees with the Discharge Plan?: Yes  Freedom of Choice List was Provided with Basic Dialogue that Supports the Patient's Individualized Plan of Care/Goals, Treatment Preferences and Shares the Quality Data Associated with the Providers?: Yes  Discharge Location  Patient Expects to be Discharged to[de-identified] [address] [place] [address] MSW RN   [phone]     Addendum:  11:55AM  Patient now needs a Nebulizer and supplies for home use. MD putting in orders and face to face. To send to DME Co . Will need to get authorization . - patient want to use provider that the insurance will cover. Sending to [facility]  Resp. Patient did not want to wait to be delivered. Gave on e out of self-pay closet - nursing to teach . Use and document.   Home nebulizer to be delivered once insurance approved

## 2022-08-29 NOTE — PROGRESS NOTES
Bedside shift change report given to Adeola Vaca (oncoming nurse) by Richmond Lester (offgoing nurse). Report included the following information SBAR, Kardex, and MAR. Patient received due medication and care as per orders, rounded on per protocol. 1400 Reviewed discharge instructions with pt including follow-up appointments, new medications and side effects, medications to continue, medications to discontinue, Nebulizer use education and opportunity for teach. signs/symptoms of stroke and heart attack, and MyChart information. Pt expressed understanding. IV was removed. Belongings sent home with pt. Left unit at 1420.

## 2022-08-29 NOTE — DISCHARGE SUMMARY
Hospitalist Discharge Summary     Patient ID:  Selam Isaacs  [id number]  14 y.o.  1945    PCP on record: Arie Perez MD    Admit date: 8/26/2022  Discharge date and time: 8/29/2022    Please note that this dictation was completed with [employer], the [employer] voice recognition software. Quite often unanticipated grammatical, syntax, homophones, and other interpretive errors are inadvertently transcribed by the computer software. Please disregard these errors. Please excuse any errors that have escaped final proofreading. Admission Diagnoses: Acute respiratory failure with hypoxia ([address] [J96.01]    Discharge Diagnoses: Active Problems:    Acute respiratory failure with hypoxia ([address] (8/26/2022)         Hospital Course:       Acute respiratory failure with hypoxia POA due to   COPD Exacerbation POA   sPO2 84% on room air. CXR interpreted independently no consolidation or pleural effusion noted. CTA Chest reviewed - no PE noted, emphysematous changes in lungs, enlarged main pulmonary artery. COVID, Influenza PCR negative. Resolved with steroids and nebs. Uncontrolled hypertension POA   HLD POA  Continue amlodipine, lisinopril. metoprolol  - continue statin        Tobacco dependence POA   - Patient was counseled extensively on the need to abstain from tobacco, its addictive tendencies, its deleterious effects on the lungs, cardiovascular  as well as its financial & social sequelae  - Nicotine patch         GERD POA  - continue PPI           CONSULTATIONS:  None    Excerpted HPI from H&P of Ramesh Hilliard MD:  69 y.o.  female with PMH of CAD, COPD, Arthritis, HLD, HTN who presents to ED with c/o shortness of breath. Patient reported shortness of breath on exertion, wheezing , nonproductive cough, that has been going on for past 3-4 days. Presented to PCP for Check up and was sent ro ER. She was found to have sPO2 84 % on room air in ED triage.  Denies fevers, chills, sick contacts. She is vaccinated for COVID. We were asked to admit for work up and evaluation of the above problems. ______________________________________________________________________  DISCHARGE SUMMARY/HOSPITAL COURSE:  for full details see H&P, daily progress notes, labs, consult notes. Visit Vitals  BP (!) 142/72 (BP 1 Location: Right upper arm, BP Patient Position: Sitting)   Pulse 86   Temp 98.2 °F (36.8 °C)   Resp 20   Ht 5' 6\" (1.676 m)   Wt 81.3 kg (179 lb 4.8 oz)   SpO2 90%   Breastfeeding No   BMI 28.94 kg/m²         _______________________________________________________________________  Patient seen and examined by me on discharge day. Pertinent Findings:  Gen:    Not in distress  Chest: Clear lungs  CVS:   Regular rhythm. No edema  Abd:  Soft, not distended, not tender  Neuro:  Alert with good insight. Oriented to person, place, and time   _______________________________________________________________________  DISCHARGE MEDICATIONS:   Current Discharge Medication List        START taking these medications    Details   albuterol-ipratropium (DUO-NEB) 2.5 mg-0.5 mg/3 ml nebu Use 4 times daily round the clock for 3 days, use as needed every 6 hours for wheezing/respiratory distress  Qty: 30 Nebule, Refills: 0  Start date: 8/29/2022           CONTINUE these medications which have CHANGED    Details   predniSONE (DELTASONE) 20 mg tablet Take 2 Tablets by mouth daily (with breakfast) for 5 days. Qty: 10 Tablet, Refills: 0  Start date: 8/29/2022, End date: 9/3/2022           CONTINUE these medications which have NOT CHANGED    Details   albuterol (PROVENTIL HFA, VENTOLIN HFA, PROAIR HFA) 90 mcg/actuation inhaler Take 2 Puffs by inhalation every four (4) hours as needed for Wheezing. atorvastatin (LIPITOR) 40 mg tablet Take 1 Tablet by mouth daily. alendronate (FOSAMAX) 70 mg tablet Take 1 Tablet by mouth every seven (7) days.  On Thursdays      fluticasone propion-salmeteroL (ADVAIR/WIXELA) 500-50 mcg/dose diskus inhaler Take 1 Puff by inhalation two (2) times a day. furosemide (LASIX) 20 mg tablet Take 1 Tablet by mouth daily. gabapentin (NEURONTIN) 800 mg tablet Take 1 Tablet by mouth three (3) times daily. metoprolol tartrate (LOPRESSOR) 25 mg tablet Take 1 Tablet by mouth two (2) times a day. DULoxetine (CYMBALTA) 60 mg capsule Take 1 Capsule by mouth daily. diclofenac (VOLTAREN) 1 % gel Apply  to affected area four (4) times daily. traMADol (ULTRAM) 50 mg tablet Take 50 mg by mouth two (2) times daily as needed for Pain. omeprazole (PRILOSEC) 20 mg capsule Take 20 mg by mouth daily. amlodipine (NORVASC) 5 mg tablet Take 1 tablet by mouth daily. Qty: 30 tablet, Refills: 12    Associated Diagnoses: CAD (coronary artery disease); Chest pain at rest; Claudication in peripheral vascular disease (HCC)      lisinopril (PRINIVIL, ZESTRIL) 10 mg tablet Take 1 tablet by mouth daily. Qty: 30 tablet, Refills: 12    Associated Diagnoses: SOB (shortness of breath); Hypertension      nitroglycerin (NITROSTAT) 0.4 mg SL tablet 1 tablet by SubLINGual route every five (5) minutes as needed for Chest Pain. Qty: 25 tablet, Refills: 12    Associated Diagnoses: CAD (coronary artery disease); SOB (shortness of breath); Chest pain at rest      aspirin delayed-release 81 mg tablet Take  by mouth daily. STOP taking these medications       azithromycin (ZITHROMAX) 250 mg tablet Comments:   Reason for Stopping:         cyanocobalamin (VITAMIN B12) 100 mcg tablet Comments:   Reason for Stopping:               My Recommended Diet, Activity, Wound Care, and follow-up labs are listed in the patient's Discharge Insturctions which I have personally completed and reviewed.     _______________________________________________________________________  DISPOSITION:     Home with Family: x   Home with HH/PT/OT/RN:    SNF/LTC:    OLAMIDE:    OTHER:        Condition at Discharge:  Stable  _______________________________________________________________________  Follow up with:   PCP : Kriss Aguirre MD  Follow-up Information       Follow up With Specialties Details Why Diamante Link MD Internal Medicine Physician Follow up Please call office and schedule follow-up appointment [phone]  [phone]                  Total time in minutes spent coordinating this discharge (includes going over instructions, follow-up, prescriptions, and preparing report for sign off to her PCP) :  45 minutes    Signed:  Cristina Benson MD

## 2022-08-29 NOTE — PROGRESS NOTES
1108-Writer removed oxygen from patient. O2 sat is 92% on room air at rest. 1110-Writer supervised patient to ambulate in hallways with rollator walker on room air. Patient maintained O2 sat 92-97% during ambulation , post ambulation resting at edge of bed, patient sat is 95%.  C/Florian Gregorio notified and MD Tawanna Potts and patient remains on room air. Telemetry monitoring discontinued.

## 2022-08-29 NOTE — PROGRESS NOTES
Patient has an order for 6 minute walk due to patient being high risk for falls Respiratory department is unable to perform the 6 minute walk per protocol. Six minute walk can still be obtainable through PT department per protocol.

## 2022-08-29 NOTE — PROGRESS NOTES
Bedside shift change report given to Sobeida (oncoming nurse) by Tj Vazquez (offgoing nurse). Report included the following information SBAR, Kardex, and MAR.

## 2022-08-31 ENCOUNTER — TELEPHONE (OUTPATIENT)
Dept: CASE MANAGEMENT | Age: 77
End: 2022-08-31

## 2022-08-31 LAB
BACTERIA SPEC CULT: NORMAL
SERVICE CMNT-IMP: NORMAL

## 2022-08-31 NOTE — TELEPHONE ENCOUNTER
CM called patient by telephone to perform post discharge assessment and for the purpose of follow up call from inpatient discharge to check on environmental challenges/medications/appointment follow up/and questions/concerns. The call was answered by patient/family/caretaker/agency, introduction self, and explanation and reason for call, name and  confirmed. Per patient she is sitting outside enjoying the day and feeling better, Vale called patient schedule visit for  @ 0900.     [address] [facility], [address] [phone]

## 2022-11-17 ENCOUNTER — TRANSCRIBE ORDER (OUTPATIENT)
Dept: SCHEDULING | Age: 77
End: 2022-11-17

## 2022-11-17 DIAGNOSIS — M79.622 PAIN OF LEFT UPPER ARM: Primary | ICD-10-CM

## 2022-11-28 ENCOUNTER — HOSPITAL ENCOUNTER (OUTPATIENT)
Dept: CT IMAGING | Age: 77
Discharge: HOME | End: 2022-11-28
Attending: INTERNAL MEDICINE
Payer: MEDICARE

## 2022-11-28 DIAGNOSIS — M79.622 PAIN OF LEFT UPPER ARM: ICD-10-CM

## 2022-11-28 PROCEDURE — 72125 CT NECK SPINE W/O DYE: CPT

## 2023-01-17 ENCOUNTER — TRANSCRIBE ORDER (OUTPATIENT)
Dept: SCHEDULING | Age: 78
End: 2023-01-17

## 2023-01-17 DIAGNOSIS — M81.0 SENILE OSTEOPOROSIS: ICD-10-CM

## 2023-01-17 DIAGNOSIS — Z12.31 SCREENING MAMMOGRAM FOR HIGH-RISK PATIENT: Primary | ICD-10-CM

## 2023-03-01 ENCOUNTER — HOSPITAL ENCOUNTER (OUTPATIENT)
Dept: MAMMOGRAPHY | Age: 78
Discharge: HOME | End: 2023-03-01
Attending: INTERNAL MEDICINE
Payer: MEDICARE

## 2023-03-01 ENCOUNTER — HOSPITAL ENCOUNTER (OUTPATIENT)
Dept: BONE DENSITY | Age: 78
Discharge: HOME | End: 2023-03-01
Attending: INTERNAL MEDICINE
Payer: MEDICARE

## 2023-03-01 ENCOUNTER — OFFICE VISIT (OUTPATIENT)
Dept: NEUROLOGY | Age: 78
End: 2023-03-01
Payer: MEDICARE

## 2023-03-01 DIAGNOSIS — M81.0 SENILE OSTEOPOROSIS: ICD-10-CM

## 2023-03-01 DIAGNOSIS — Z12.31 SCREENING MAMMOGRAM FOR HIGH-RISK PATIENT: ICD-10-CM

## 2023-03-01 DIAGNOSIS — G56.03 BILATERAL CARPAL TUNNEL SYNDROME: Primary | ICD-10-CM

## 2023-03-01 PROCEDURE — 77067 SCR MAMMO BI INCL CAD: CPT

## 2023-03-01 PROCEDURE — 77080 DXA BONE DENSITY AXIAL: CPT

## 2023-03-01 PROCEDURE — 95913 NRV CNDJ TEST 13/> STUDIES: CPT | Performed by: PSYCHIATRY & NEUROLOGY

## 2023-03-01 PROCEDURE — 95886 MUSC TEST DONE W/N TEST COMP: CPT | Performed by: PSYCHIATRY & NEUROLOGY

## 2023-03-01 NOTE — PROCEDURES
ELECTRODIANOSTIC REPORT  Test Date:  3/1/2023    Patient: Odalis Ramos : 1945 Physician: Dr Iraida Ulrich,   Sex: Female Tech: Marely Estrella, EMG Technician  Ref Phys: Dr. Karma Sorensen:  Patient is a 69year-old female who presents with history disturbance and pain in her bilateral upper extremities. She does have sensory loss in the median distribution. She has mild APB weakness    EMG & NCV Findings:    Nerve conduction studies as listed below were absent for the bilateral median sensory. The bilateral radial sensory and ulnar sensory were normal.  The bilateral median motor nerve conduction studies revealed a prolongation of the distal motor latency with normal amplitudes and low normal conduction velocity. The bilateral ulnar motor nerve conduction studies were normal.  The bilateral median mixed nerve studies were absent. The ulnar studies were present. Disposable concentric needle examination of the muscles listed below in the bilateral upper extremities did reveal increased insertional activity in the form of fibrillations and positive waves with reduced recruitment in the bilateral abductor pollicis brevis. Impression: This study was abnormal.  There is electrodiagnostic evidence upon today's examination suggestin. 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 2.  There is no evidence of a generalized large fiber neuropathy, myopathy, or cervical radiculopathy. __________________  Ciara Avendaño D.O.  FAAN        Nerve Conduction Studies  Anti Sensory Summary Table     Stim Site NR Peak (ms) Norm Peak (ms) O-P Amp (µV) Norm O-P Amp Site1 Site2 Dist (cm)   Left Median Anti Sensory (2nd Digit)  33.5 °C   Wrist NR  <4  >11 Wrist 2nd Digit 14.0   Right Median Anti Sensory (2nd Digit)  33.5 °C   Wrist NR  <4  >11 Wrist 2nd Digit 14.0   Left Radial Anti Sensory (Base 1st Digit) 33.5 °C   Wrist    2.2 <2.9 30.0 >15 Wrist Base 1st Digit 10.0   Right Radial Anti Sensory (Base 1st Digit)  33.5 °C   Wrist    2.2 <2.9 28.6 >15 Wrist Base 1st Digit 10.0   Left Ulnar Anti Sensory (5th Digit)  33.5 °C   Wrist    3.8 <4.0 14.9 >10 Wrist 5th Digit 14.0   Right Ulnar Anti Sensory (5th Digit)  33.5 °C   Wrist    3.6 <4.0 15.6 >10 Wrist 5th Digit 14.0     Motor Summary Table     Stim Site NR Onset (ms) Norm Onset (ms) O-P* Amp (mV) Norm O-P Amp P-T Amp (mV) Site1 Site2 Dist (cm) Romeo (m/s)   Left Median Motor (Abd Poll Brev)  33.5 °C   Wrist    9.6 <4.5 4.1 >4.1  Wrist Abd Poll Brev 8.0    Elbow    15.4  3.5   Elbow Wrist 21.0 36   Right Median Motor (Abd Poll Brev)  33.5 °C   Wrist    9.5 <4.5 4.9 >4.1  Wrist Abd Poll Brev 8.0    Elbow    15.0  4.4   Elbow Wrist 21.5 39   Left Ulnar Motor (Abd Dig Minimi)  33.5 °C   Wrist    3.0 <3.1 9.4 >7.0  Wrist Abd Dig Minimi 8.0    B Elbow    7.1  8.3   B Elbow Wrist 22.0 54   A Elbow    9.0  7.6   A Elbow B Elbow 10.0 53   Right Ulnar Motor (Abd Dig Minimi)  33.5 °C   Wrist    2.8 <3.1 11.0 >7.0  Wrist Abd Dig Minimi 8.0    B Elbow    7.1  10.7   B Elbow Wrist 21.5 50   A Elbow    9.0  9.9   A Elbow B Elbow 10.0 53     Comparison Summary Table     Stim Site NR Peak (ms) P-T Amp (µV) Site1 Site2 Dist (cm) Delta-0 (ms)   Left Median/Ulnar Palm Comparison (Wrist)  33.5 °C   Median Palm NR   Median Palm Ulnar Palm 8.0    Ulnar Palm    2.4 33.0       Right Median/Ulnar Palm Comparison (Wrist)  33.5 °C   Median Palm NR   Median Palm Ulnar Palm 8.0    Ulnar Palm    2.4 28.8         EMG     Side Muscle Nerve Root Ins Act Fibs Psw Recrt Duration Amp Poly Comment   Left Deltoid Axillary C5-6 Nml Nml Nml Nml Nml Nml Nml    Left Triceps Radial C6-7-8 Nml Nml Nml Nml Nml Nml Nml    Left PronatorTeres Median C6-7 Nml Nml Nml Nml Nml Nml Nml    Left 1stDorInt Ulnar C8-T1 Nml Nml Nml Nml Nml Nml Nml    Left Abd Poll Brev Median C8-T1 Incr 1+ 1+ Reduced Nml Nml Nml    Right Deltoid Axillary C5-6 Nml Nml Nml Nml Nml Nml Nml    Right Triceps Radial C6-7-8 Nml Nml Nml Nml Nml Nml Nml    Right PronatorTeres Median C6-7 Nml Nml Nml Nml Nml Nml Nml    Right 1stDorInt Ulnar C8-T1 Nml Nml Nml Nml Nml Nml Nml    Right Abd Poll Brev Median C8-T1 Incr 2+ 2+ Reduced Nml Nml Nml      Waveforms:

## 2023-12-27 ENCOUNTER — APPOINTMENT (OUTPATIENT)
Facility: HOSPITAL | Age: 78
End: 2023-12-27
Payer: MEDICARE

## 2023-12-27 ENCOUNTER — HOSPITAL ENCOUNTER (EMERGENCY)
Facility: HOSPITAL | Age: 78
Discharge: ANOTHER HOSPITAL | End: 2023-12-28
Attending: EMERGENCY MEDICINE
Payer: MEDICARE

## 2023-12-27 DIAGNOSIS — J96.02 ACUTE RESPIRATORY FAILURE WITH HYPOXIA AND HYPERCAPNIA (HCC): Primary | ICD-10-CM

## 2023-12-27 DIAGNOSIS — I27.20 PULMONARY HYPERTENSION (HCC): ICD-10-CM

## 2023-12-27 DIAGNOSIS — J06.9 ACUTE UPPER RESPIRATORY INFECTION: ICD-10-CM

## 2023-12-27 DIAGNOSIS — J96.01 ACUTE RESPIRATORY FAILURE WITH HYPOXIA AND HYPERCAPNIA (HCC): Primary | ICD-10-CM

## 2023-12-27 LAB
ALBUMIN SERPL-MCNC: 3 G/DL (ref 3.5–5)
ALBUMIN/GLOB SERPL: 0.7 (ref 1.1–2.2)
ALP SERPL-CCNC: 86 U/L (ref 45–117)
ALT SERPL-CCNC: 27 U/L (ref 12–78)
ANION GAP SERPL CALC-SCNC: 9 MMOL/L (ref 5–15)
ARTERIAL PATENCY WRIST A: ABNORMAL
AST SERPL-CCNC: 77 U/L (ref 15–37)
BASE EXCESS BLDA CALC-SCNC: 3.5 MMOL/L
BASOPHILS # BLD: 0.1 K/UL (ref 0–0.1)
BASOPHILS NFR BLD: 1 % (ref 0–1)
BDY SITE: ABNORMAL
BILIRUB SERPL-MCNC: 1 MG/DL (ref 0.2–1)
BUN SERPL-MCNC: 22 MG/DL (ref 6–20)
BUN/CREAT SERPL: 16 (ref 12–20)
CALCIUM SERPL-MCNC: 8.9 MG/DL (ref 8.5–10.1)
CHLORIDE SERPL-SCNC: 100 MMOL/L (ref 97–108)
CO2 SERPL-SCNC: 32 MMOL/L (ref 21–32)
CREAT SERPL-MCNC: 1.41 MG/DL (ref 0.55–1.02)
DIFFERENTIAL METHOD BLD: ABNORMAL
EOSINOPHIL # BLD: 0 K/UL (ref 0–0.4)
EOSINOPHIL NFR BLD: 0 % (ref 0–7)
ERYTHROCYTE [DISTWIDTH] IN BLOOD BY AUTOMATED COUNT: 18.1 % (ref 11.5–14.5)
FLUAV RNA SPEC QL NAA+PROBE: NOT DETECTED
FLUBV RNA SPEC QL NAA+PROBE: NOT DETECTED
GAS FLOW.O2 O2 DELIVERY SYS: 4 L/MIN
GLOBULIN SER CALC-MCNC: 4.3 G/DL (ref 2–4)
GLUCOSE SERPL-MCNC: 120 MG/DL (ref 65–100)
HCO3 BLDA-SCNC: 33 MMOL/L (ref 22–26)
HCT VFR BLD AUTO: 49.9 % (ref 35–47)
HGB BLD-MCNC: 15.1 G/DL (ref 11.5–16)
IMM GRANULOCYTES # BLD AUTO: 0 K/UL (ref 0–0.04)
IMM GRANULOCYTES NFR BLD AUTO: 0 % (ref 0–0.5)
LYMPHOCYTES # BLD: 1.5 K/UL (ref 0.8–3.5)
LYMPHOCYTES NFR BLD: 13 % (ref 12–49)
MAGNESIUM SERPL-MCNC: 1.9 MG/DL (ref 1.6–2.4)
MCH RBC QN AUTO: 25.8 PG (ref 26–34)
MCHC RBC AUTO-ENTMCNC: 30.3 G/DL (ref 30–36.5)
MCV RBC AUTO: 85.2 FL (ref 80–99)
MONOCYTES # BLD: 2.2 K/UL (ref 0–1)
MONOCYTES NFR BLD: 19 % (ref 5–13)
NEUTS SEG # BLD: 7.7 K/UL (ref 1.8–8)
NEUTS SEG NFR BLD: 67 % (ref 32–75)
NRBC # BLD: 0 K/UL (ref 0–0.01)
NRBC BLD-RTO: 0 PER 100 WBC
NT PRO BNP: 2714 PG/ML (ref 0–450)
PCO2 BLDA: 71 MMHG (ref 35–45)
PH BLDA: 7.28 (ref 7.35–7.45)
PLATELET # BLD AUTO: 215 K/UL (ref 150–400)
PMV BLD AUTO: 11.4 FL (ref 8.9–12.9)
PO2 BLDA: 82 MMHG (ref 80–100)
POTASSIUM SERPL-SCNC: 3.7 MMOL/L (ref 3.5–5.1)
PROT SERPL-MCNC: 7.3 G/DL (ref 6.4–8.2)
RBC # BLD AUTO: 5.86 M/UL (ref 3.8–5.2)
RBC MORPH BLD: ABNORMAL
SAO2 % BLD: 94 % (ref 92–97)
SAO2% DEVICE SAO2% SENSOR NAME: ABNORMAL
SARS-COV-2 RNA RESP QL NAA+PROBE: NOT DETECTED
SERVICE CMNT-IMP: ABNORMAL
SODIUM SERPL-SCNC: 141 MMOL/L (ref 136–145)
SPECIMEN SITE: ABNORMAL
TROPONIN I SERPL HS-MCNC: 36 NG/L (ref 0–51)
TROPONIN I SERPL HS-MCNC: 37 NG/L (ref 0–51)
TSH SERPL DL<=0.05 MIU/L-ACNC: 2.75 UIU/ML (ref 0.36–3.74)
WBC # BLD AUTO: 11.5 K/UL (ref 3.6–11)

## 2023-12-27 PROCEDURE — 96365 THER/PROPH/DIAG IV INF INIT: CPT

## 2023-12-27 PROCEDURE — 80053 COMPREHEN METABOLIC PANEL: CPT

## 2023-12-27 PROCEDURE — 36600 WITHDRAWAL OF ARTERIAL BLOOD: CPT

## 2023-12-27 PROCEDURE — 71045 X-RAY EXAM CHEST 1 VIEW: CPT

## 2023-12-27 PROCEDURE — 85025 COMPLETE CBC W/AUTO DIFF WBC: CPT

## 2023-12-27 PROCEDURE — 83880 ASSAY OF NATRIURETIC PEPTIDE: CPT

## 2023-12-27 PROCEDURE — 6360000004 HC RX CONTRAST MEDICATION: Performed by: EMERGENCY MEDICINE

## 2023-12-27 PROCEDURE — 73560 X-RAY EXAM OF KNEE 1 OR 2: CPT

## 2023-12-27 PROCEDURE — 6360000002 HC RX W HCPCS: Performed by: EMERGENCY MEDICINE

## 2023-12-27 PROCEDURE — 87636 SARSCOV2 & INF A&B AMP PRB: CPT

## 2023-12-27 PROCEDURE — 6370000000 HC RX 637 (ALT 250 FOR IP): Performed by: EMERGENCY MEDICINE

## 2023-12-27 PROCEDURE — 83735 ASSAY OF MAGNESIUM: CPT

## 2023-12-27 PROCEDURE — 96375 TX/PRO/DX INJ NEW DRUG ADDON: CPT

## 2023-12-27 PROCEDURE — 84443 ASSAY THYROID STIM HORMONE: CPT

## 2023-12-27 PROCEDURE — 82803 BLOOD GASES ANY COMBINATION: CPT

## 2023-12-27 PROCEDURE — 2700000000 HC OXYGEN THERAPY PER DAY

## 2023-12-27 PROCEDURE — 94640 AIRWAY INHALATION TREATMENT: CPT

## 2023-12-27 PROCEDURE — 93005 ELECTROCARDIOGRAM TRACING: CPT | Performed by: EMERGENCY MEDICINE

## 2023-12-27 PROCEDURE — 94660 CPAP INITIATION&MGMT: CPT

## 2023-12-27 PROCEDURE — 71260 CT THORAX DX C+: CPT

## 2023-12-27 PROCEDURE — 84484 ASSAY OF TROPONIN QUANT: CPT

## 2023-12-27 PROCEDURE — 36415 COLL VENOUS BLD VENIPUNCTURE: CPT

## 2023-12-27 PROCEDURE — 99285 EMERGENCY DEPT VISIT HI MDM: CPT

## 2023-12-27 RX ORDER — ONDANSETRON 2 MG/ML
4 INJECTION INTRAMUSCULAR; INTRAVENOUS
Status: COMPLETED | OUTPATIENT
Start: 2023-12-27 | End: 2023-12-27

## 2023-12-27 RX ORDER — IPRATROPIUM BROMIDE AND ALBUTEROL SULFATE 2.5; .5 MG/3ML; MG/3ML
1 SOLUTION RESPIRATORY (INHALATION)
Status: COMPLETED | OUTPATIENT
Start: 2023-12-27 | End: 2023-12-27

## 2023-12-27 RX ORDER — MAGNESIUM SULFATE 1 G/100ML
1000 INJECTION INTRAVENOUS
Status: COMPLETED | OUTPATIENT
Start: 2023-12-27 | End: 2023-12-28

## 2023-12-27 RX ADMIN — IPRATROPIUM BROMIDE AND ALBUTEROL SULFATE 1 DOSE: .5; 3 SOLUTION RESPIRATORY (INHALATION) at 23:28

## 2023-12-27 RX ADMIN — IOPAMIDOL 100 ML: 755 INJECTION, SOLUTION INTRAVENOUS at 21:33

## 2023-12-27 RX ADMIN — METHYLPREDNISOLONE SODIUM SUCCINATE 125 MG: 125 INJECTION, POWDER, FOR SOLUTION INTRAMUSCULAR; INTRAVENOUS at 20:07

## 2023-12-27 RX ADMIN — MAGNESIUM SULFATE HEPTAHYDRATE 1000 MG: 1 INJECTION, SOLUTION INTRAVENOUS at 22:55

## 2023-12-27 RX ADMIN — IPRATROPIUM BROMIDE AND ALBUTEROL SULFATE 1 DOSE: 2.5; .5 SOLUTION RESPIRATORY (INHALATION) at 20:28

## 2023-12-27 RX ADMIN — ONDANSETRON 4 MG: 2 INJECTION INTRAMUSCULAR; INTRAVENOUS at 20:26

## 2023-12-27 NOTE — ED PROVIDER NOTES
Suspect bnp elevation more related to chronic pulm htn   [SS]   2231 Magnesium: 1.9 [SS]   2302 Although patient was able to maintain her sats above 95% on 4 L oxygen, she is somnolent and her CO2 levels continue to rise. Will restart BiPAP. Will need transfer to higher level of care since she requires ventilatory assistance for her hypercapnia. [SS]   9255 Evidently every hospital is on code black so we are unable to transfer patient out at this time.  [SS]   u Dec 28, 2023   [address] with hospitalist at [facility], Dr. Richter Later. Given degree of hypercarbia and mental status change, he would like the patient to go to the ED to be evaluated before determining whether she needs stepdown or icu bed. [SS]   [id number] ED is refusing patient because they do not have capacity. Will discuss transfer to in-patient bed with intensivist. [SS]   [id number] with Nayeli Linares, intensivist LUIS ALFREDO at [facility]. He states COPD exacerbation is not severe enough to warrant ICU level of care. Feel stepdown is appropriate. Recommends continue BiPAP overnight, steroids, is in agreement with Sanjiv. Redommends increasing minute ventilation if she clinically worsens. [SS]   0141 Per transfer center, [facility] has now determined that patient should go through the ED. They suggest calling back in 2 hours when their ED should be more decompressed. [SS]   [id number] Patient is awake and complaining that the BiPAP mask is bothering her. Asking for smaller mask that is more appropriately for her face. States now giving her headache. Will dose Tylenol for analgesia and tried to optimize fit of BiPAP mask. Currently ANO x 3 with no increased work of breathing on BiPAP. [SS]   [id number] Patient accepted by Dr. Debi Tinoco, ED physician, to [facility] ED.  [SS]      ED Course User Index  [SS] Ernie Orellana MD  [WB] Aldo Dotson MD     FINAL IMPRESSION     1.  Acute respiratory failure with hypoxia and hypercapnia (HCC)       DISPOSITION/PLAN   Juarez CORNELIUS

## 2023-12-28 ENCOUNTER — HOSPITAL ENCOUNTER (INPATIENT)
Facility: HOSPITAL | Age: 78
LOS: 9 days | Discharge: NURSING, REHAB OR LONG-TERM CARE | DRG: 189 | End: 2024-01-06
Attending: EMERGENCY MEDICINE | Admitting: INTERNAL MEDICINE
Payer: MEDICARE

## 2023-12-28 VITALS
SYSTOLIC BLOOD PRESSURE: 124 MMHG | BODY MASS INDEX: 25.62 KG/M2 | RESPIRATION RATE: 19 BRPM | TEMPERATURE: 99.1 F | DIASTOLIC BLOOD PRESSURE: 67 MMHG | OXYGEN SATURATION: 98 % | HEART RATE: 90 BPM | HEIGHT: 69 IN | WEIGHT: 173 LBS

## 2023-12-28 DIAGNOSIS — J96.02 ACUTE RESPIRATORY FAILURE WITH HYPERCAPNIA (HCC): ICD-10-CM

## 2023-12-28 DIAGNOSIS — I27.20 PULMONARY HYPERTENSION (HCC): Primary | ICD-10-CM

## 2023-12-28 PROBLEM — J96.21 ACUTE ON CHRONIC RESPIRATORY FAILURE WITH HYPOXIA AND HYPERCAPNIA (HCC): Status: ACTIVE | Noted: 2023-12-28

## 2023-12-28 PROBLEM — J96.22 ACUTE ON CHRONIC RESPIRATORY FAILURE WITH HYPOXIA AND HYPERCAPNIA (HCC): Status: ACTIVE | Noted: 2023-12-28

## 2023-12-28 LAB
ARTERIAL PATENCY WRIST A: POSITIVE
ARTERIAL PATENCY WRIST A: YES
B PERT DNA SPEC QL NAA+PROBE: NOT DETECTED
BASE EXCESS BLD CALC-SCNC: 4.2 MMOL/L
BASE EXCESS BLDA CALC-SCNC: 2.2 MMOL/L
BDY SITE: ABNORMAL
BDY SITE: ABNORMAL
BORDETELLA PARAPERTUSSIS BY PCR: NOT DETECTED
C PNEUM DNA SPEC QL NAA+PROBE: NOT DETECTED
EKG ATRIAL RATE: 90 BPM
EKG DIAGNOSIS: NORMAL
EKG P AXIS: 67 DEGREES
EKG P-R INTERVAL: 162 MS
EKG Q-T INTERVAL: 408 MS
EKG QRS DURATION: 132 MS
EKG QTC CALCULATION (BAZETT): 499 MS
EKG R AXIS: -34 DEGREES
EKG T AXIS: 23 DEGREES
EKG VENTRICULAR RATE: 90 BPM
FIO2 ON VENT: 30 %
FLUAV SUBTYP SPEC NAA+PROBE: NOT DETECTED
FLUBV RNA SPEC QL NAA+PROBE: NOT DETECTED
GAS FLOW.O2 O2 DELIVERY SYS: ABNORMAL
GLUCOSE BLD STRIP.AUTO-MCNC: 147 MG/DL (ref 65–117)
GLUCOSE BLD STRIP.AUTO-MCNC: 192 MG/DL (ref 65–117)
GLUCOSE BLD STRIP.AUTO-MCNC: 216 MG/DL (ref 65–117)
HADV DNA SPEC QL NAA+PROBE: NOT DETECTED
HCO3 BLD-SCNC: 33 MMOL/L (ref 22–26)
HCO3 BLDA-SCNC: 33 MMOL/L (ref 22–26)
HCOV 229E RNA SPEC QL NAA+PROBE: NOT DETECTED
HCOV HKU1 RNA SPEC QL NAA+PROBE: NOT DETECTED
HCOV NL63 RNA SPEC QL NAA+PROBE: NOT DETECTED
HCOV OC43 RNA SPEC QL NAA+PROBE: NOT DETECTED
HMPV RNA SPEC QL NAA+PROBE: NOT DETECTED
HPIV1 RNA SPEC QL NAA+PROBE: NOT DETECTED
HPIV2 RNA SPEC QL NAA+PROBE: NOT DETECTED
HPIV3 RNA SPEC QL NAA+PROBE: NOT DETECTED
HPIV4 RNA SPEC QL NAA+PROBE: NOT DETECTED
IPAP/PIP: 12
M PNEUMO DNA SPEC QL NAA+PROBE: NOT DETECTED
O2/TOTAL GAS SETTING VFR VENT: 30 %
PCO2 BLD: 62.6 MMHG (ref 35–45)
PCO2 BLDA: 84 MMHG (ref 35–45)
PEEP RESPIRATORY: 5
PEEP RESPIRATORY: 6 CMH2O
PH BLD: 7.33 (ref 7.35–7.45)
PH BLDA: 7.21 (ref 7.35–7.45)
PO2 BLD: 79 MMHG (ref 80–100)
PO2 BLDA: 67 MMHG (ref 80–100)
PRESSURE SUPPORT SETTING VENT: 18 CMH2O
RSV RNA SPEC QL NAA+PROBE: NOT DETECTED
RV+EV RNA SPEC QL NAA+PROBE: NOT DETECTED
SAO2 % BLD: 88 % (ref 92–97)
SAO2 % BLD: 94.2 % (ref 92–97)
SAO2% DEVICE SAO2% SENSOR NAME: ABNORMAL
SARS-COV-2 RNA RESP QL NAA+PROBE: NOT DETECTED
SERVICE CMNT-IMP: ABNORMAL
SPECIMEN SITE: ABNORMAL
SPECIMEN TYPE: ABNORMAL
VENTILATION MODE VENT: ABNORMAL

## 2023-12-28 PROCEDURE — 5A09557 ASSISTANCE WITH RESPIRATORY VENTILATION, GREATER THAN 96 CONSECUTIVE HOURS, CONTINUOUS POSITIVE AIRWAY PRESSURE: ICD-10-PCS | Performed by: HOSPITALIST

## 2023-12-28 PROCEDURE — 99285 EMERGENCY DEPT VISIT HI MDM: CPT

## 2023-12-28 PROCEDURE — 36600 WITHDRAWAL OF ARTERIAL BLOOD: CPT

## 2023-12-28 PROCEDURE — 82803 BLOOD GASES ANY COMBINATION: CPT

## 2023-12-28 PROCEDURE — 6370000000 HC RX 637 (ALT 250 FOR IP): Performed by: INTERNAL MEDICINE

## 2023-12-28 PROCEDURE — 94640 AIRWAY INHALATION TREATMENT: CPT

## 2023-12-28 PROCEDURE — 2060000000 HC ICU INTERMEDIATE R&B

## 2023-12-28 PROCEDURE — 6370000000 HC RX 637 (ALT 250 FOR IP): Performed by: EMERGENCY MEDICINE

## 2023-12-28 PROCEDURE — 6360000002 HC RX W HCPCS: Performed by: EMERGENCY MEDICINE

## 2023-12-28 PROCEDURE — 96367 TX/PROPH/DG ADDL SEQ IV INF: CPT

## 2023-12-28 PROCEDURE — 93010 ELECTROCARDIOGRAM REPORT: CPT | Performed by: SPECIALIST

## 2023-12-28 PROCEDURE — 0202U NFCT DS 22 TRGT SARS-COV-2: CPT

## 2023-12-28 PROCEDURE — 2580000003 HC RX 258: Performed by: INTERNAL MEDICINE

## 2023-12-28 PROCEDURE — 96376 TX/PRO/DX INJ SAME DRUG ADON: CPT

## 2023-12-28 PROCEDURE — 82962 GLUCOSE BLOOD TEST: CPT

## 2023-12-28 PROCEDURE — 6360000002 HC RX W HCPCS: Performed by: INTERNAL MEDICINE

## 2023-12-28 PROCEDURE — 94762 N-INVAS EAR/PLS OXIMTRY CONT: CPT

## 2023-12-28 RX ORDER — IPRATROPIUM BROMIDE AND ALBUTEROL SULFATE 2.5; .5 MG/3ML; MG/3ML
2 SOLUTION RESPIRATORY (INHALATION)
Status: COMPLETED | OUTPATIENT
Start: 2023-12-28 | End: 2023-12-28

## 2023-12-28 RX ORDER — SODIUM CHLORIDE 0.9 % (FLUSH) 0.9 %
5-40 SYRINGE (ML) INJECTION PRN
Status: DISCONTINUED | OUTPATIENT
Start: 2023-12-28 | End: 2024-01-06 | Stop reason: HOSPADM

## 2023-12-28 RX ORDER — AMLODIPINE BESYLATE 5 MG/1
5 TABLET ORAL DAILY
Status: DISCONTINUED | OUTPATIENT
Start: 2023-12-29 | End: 2024-01-01

## 2023-12-28 RX ORDER — ACETAMINOPHEN 500 MG
1000 TABLET ORAL
Status: COMPLETED | OUTPATIENT
Start: 2023-12-28 | End: 2023-12-28

## 2023-12-28 RX ORDER — LANOLIN ALCOHOL/MO/W.PET/CERES
1000 CREAM (GRAM) TOPICAL DAILY
COMMUNITY

## 2023-12-28 RX ORDER — DIAZEPAM 5 MG/ML
2.5 INJECTION, SOLUTION INTRAMUSCULAR; INTRAVENOUS ONCE
Status: DISCONTINUED | OUTPATIENT
Start: 2023-12-28 | End: 2024-01-03 | Stop reason: ALTCHOICE

## 2023-12-28 RX ORDER — FUROSEMIDE 10 MG/ML
20 INJECTION INTRAMUSCULAR; INTRAVENOUS 2 TIMES DAILY
Status: DISCONTINUED | OUTPATIENT
Start: 2023-12-28 | End: 2024-01-02

## 2023-12-28 RX ORDER — INSULIN LISPRO 100 [IU]/ML
0-4 INJECTION, SOLUTION INTRAVENOUS; SUBCUTANEOUS EVERY 6 HOURS
Status: DISCONTINUED | OUTPATIENT
Start: 2023-12-28 | End: 2024-01-03

## 2023-12-28 RX ORDER — SODIUM CHLORIDE 0.9 % (FLUSH) 0.9 %
5-40 SYRINGE (ML) INJECTION EVERY 12 HOURS SCHEDULED
Status: DISCONTINUED | OUTPATIENT
Start: 2023-12-28 | End: 2023-12-30

## 2023-12-28 RX ORDER — ONDANSETRON 4 MG/1
4 TABLET, ORALLY DISINTEGRATING ORAL EVERY 8 HOURS PRN
Status: DISCONTINUED | OUTPATIENT
Start: 2023-12-28 | End: 2024-01-06 | Stop reason: HOSPADM

## 2023-12-28 RX ORDER — INSULIN LISPRO 100 [IU]/ML
0-4 INJECTION, SOLUTION INTRAVENOUS; SUBCUTANEOUS NIGHTLY
Status: DISCONTINUED | OUTPATIENT
Start: 2023-12-28 | End: 2024-01-06 | Stop reason: HOSPADM

## 2023-12-28 RX ORDER — CHOLECALCIFEROL (VITAMIN D3) 125 MCG
1000 CAPSULE ORAL DAILY
Status: DISCONTINUED | OUTPATIENT
Start: 2023-12-29 | End: 2024-01-06 | Stop reason: HOSPADM

## 2023-12-28 RX ORDER — POLYETHYLENE GLYCOL 3350 17 G/17G
17 POWDER, FOR SOLUTION ORAL DAILY PRN
Status: DISCONTINUED | OUTPATIENT
Start: 2023-12-28 | End: 2024-01-06 | Stop reason: HOSPADM

## 2023-12-28 RX ORDER — DULOXETIN HYDROCHLORIDE 60 MG/1
60 CAPSULE, DELAYED RELEASE ORAL DAILY
Status: DISCONTINUED | OUTPATIENT
Start: 2023-12-29 | End: 2024-01-06 | Stop reason: HOSPADM

## 2023-12-28 RX ORDER — PANTOPRAZOLE SODIUM 40 MG/1
40 TABLET, DELAYED RELEASE ORAL
Status: DISCONTINUED | OUTPATIENT
Start: 2023-12-29 | End: 2024-01-06 | Stop reason: HOSPADM

## 2023-12-28 RX ORDER — DIAZEPAM 5 MG/ML
2.5 INJECTION, SOLUTION INTRAMUSCULAR; INTRAVENOUS ONCE
Status: COMPLETED | OUTPATIENT
Start: 2023-12-28 | End: 2023-12-28

## 2023-12-28 RX ORDER — DEXTROSE MONOHYDRATE 100 MG/ML
INJECTION, SOLUTION INTRAVENOUS CONTINUOUS PRN
Status: DISCONTINUED | OUTPATIENT
Start: 2023-12-28 | End: 2024-01-06 | Stop reason: HOSPADM

## 2023-12-28 RX ORDER — LEVOFLOXACIN 5 MG/ML
750 INJECTION, SOLUTION INTRAVENOUS
Status: COMPLETED | OUTPATIENT
Start: 2023-12-28 | End: 2023-12-28

## 2023-12-28 RX ORDER — GABAPENTIN 600 MG/1
300 TABLET ORAL 3 TIMES DAILY
Status: DISCONTINUED | OUTPATIENT
Start: 2023-12-28 | End: 2023-12-30

## 2023-12-28 RX ORDER — TRAMADOL HYDROCHLORIDE 50 MG/1
50 TABLET ORAL 2 TIMES DAILY PRN
Status: DISCONTINUED | OUTPATIENT
Start: 2023-12-28 | End: 2024-01-06 | Stop reason: HOSPADM

## 2023-12-28 RX ORDER — SODIUM CHLORIDE 9 MG/ML
INJECTION, SOLUTION INTRAVENOUS PRN
Status: DISCONTINUED | OUTPATIENT
Start: 2023-12-28 | End: 2024-01-06 | Stop reason: HOSPADM

## 2023-12-28 RX ORDER — GUAIFENESIN 600 MG/1
600 TABLET, EXTENDED RELEASE ORAL 2 TIMES DAILY
Status: DISCONTINUED | OUTPATIENT
Start: 2023-12-28 | End: 2024-01-06 | Stop reason: HOSPADM

## 2023-12-28 RX ORDER — IPRATROPIUM BROMIDE AND ALBUTEROL SULFATE 2.5; .5 MG/3ML; MG/3ML
1 SOLUTION RESPIRATORY (INHALATION)
Status: DISCONTINUED | OUTPATIENT
Start: 2023-12-28 | End: 2024-01-01

## 2023-12-28 RX ORDER — ATORVASTATIN CALCIUM 40 MG/1
40 TABLET, FILM COATED ORAL DAILY
Status: DISCONTINUED | OUTPATIENT
Start: 2023-12-29 | End: 2024-01-06 | Stop reason: HOSPADM

## 2023-12-28 RX ORDER — ASPIRIN 81 MG/1
81 TABLET ORAL DAILY
Status: DISCONTINUED | OUTPATIENT
Start: 2023-12-29 | End: 2024-01-06 | Stop reason: HOSPADM

## 2023-12-28 RX ORDER — ONDANSETRON 2 MG/ML
4 INJECTION INTRAMUSCULAR; INTRAVENOUS EVERY 6 HOURS PRN
Status: DISCONTINUED | OUTPATIENT
Start: 2023-12-28 | End: 2024-01-06 | Stop reason: HOSPADM

## 2023-12-28 RX ORDER — PREDNISONE 20 MG/1
40 TABLET ORAL DAILY
Status: DISCONTINUED | OUTPATIENT
Start: 2023-12-30 | End: 2023-12-30

## 2023-12-28 RX ORDER — ENOXAPARIN SODIUM 100 MG/ML
40 INJECTION SUBCUTANEOUS DAILY
Status: DISCONTINUED | OUTPATIENT
Start: 2023-12-28 | End: 2023-12-29

## 2023-12-28 RX ORDER — AZITHROMYCIN 250 MG/1
500 TABLET, FILM COATED ORAL DAILY
Status: DISPENSED | OUTPATIENT
Start: 2023-12-28 | End: 2023-12-31

## 2023-12-28 RX ADMIN — METHYLPREDNISOLONE SODIUM SUCCINATE 40 MG: 40 INJECTION, POWDER, FOR SOLUTION INTRAMUSCULAR; INTRAVENOUS at 23:41

## 2023-12-28 RX ADMIN — FUROSEMIDE 20 MG: 10 INJECTION, SOLUTION INTRAMUSCULAR; INTRAVENOUS at 17:29

## 2023-12-28 RX ADMIN — INSULIN LISPRO 1 UNITS: 100 INJECTION, SOLUTION INTRAVENOUS; SUBCUTANEOUS at 15:52

## 2023-12-28 RX ADMIN — IPRATROPIUM BROMIDE AND ALBUTEROL SULFATE 1 DOSE: 2.5; .5 SOLUTION RESPIRATORY (INHALATION) at 19:57

## 2023-12-28 RX ADMIN — METHYLPREDNISOLONE SODIUM SUCCINATE 40 MG: 40 INJECTION, POWDER, FOR SOLUTION INTRAMUSCULAR; INTRAVENOUS at 11:37

## 2023-12-28 RX ADMIN — Medication 10 ML: at 11:45

## 2023-12-28 RX ADMIN — IPRATROPIUM BROMIDE AND ALBUTEROL SULFATE 2 DOSE: .5; 3 SOLUTION RESPIRATORY (INHALATION) at 02:22

## 2023-12-28 RX ADMIN — ENOXAPARIN SODIUM 40 MG: 100 INJECTION SUBCUTANEOUS at 11:00

## 2023-12-28 RX ADMIN — ACETAMINOPHEN 1000 MG: 500 TABLET ORAL at 04:19

## 2023-12-28 RX ADMIN — DIAZEPAM 2.5 MG: 5 INJECTION, SOLUTION INTRAMUSCULAR; INTRAVENOUS at 14:16

## 2023-12-28 RX ADMIN — METHYLPREDNISOLONE SODIUM SUCCINATE 40 MG: 40 INJECTION, POWDER, FOR SOLUTION INTRAMUSCULAR; INTRAVENOUS at 17:29

## 2023-12-28 RX ADMIN — METHYLPREDNISOLONE SODIUM SUCCINATE 125 MG: 125 INJECTION, POWDER, FOR SOLUTION INTRAMUSCULAR; INTRAVENOUS at 02:50

## 2023-12-28 RX ADMIN — WATER 1000 MG: 1 INJECTION INTRAMUSCULAR; INTRAVENOUS; SUBCUTANEOUS at 10:53

## 2023-12-28 RX ADMIN — GUAIFENESIN 600 MG: 600 TABLET, EXTENDED RELEASE ORAL at 20:45

## 2023-12-28 RX ADMIN — Medication 10 ML: at 20:46

## 2023-12-28 RX ADMIN — FUROSEMIDE 20 MG: 10 INJECTION, SOLUTION INTRAMUSCULAR; INTRAVENOUS at 11:38

## 2023-12-28 RX ADMIN — IPRATROPIUM BROMIDE AND ALBUTEROL SULFATE 1 DOSE: 2.5; .5 SOLUTION RESPIRATORY (INHALATION) at 11:21

## 2023-12-28 RX ADMIN — IPRATROPIUM BROMIDE AND ALBUTEROL SULFATE 1 DOSE: 2.5; .5 SOLUTION RESPIRATORY (INHALATION) at 15:27

## 2023-12-28 RX ADMIN — GABAPENTIN 300 MG: 600 TABLET, FILM COATED ORAL at 23:41

## 2023-12-28 RX ADMIN — METOPROLOL TARTRATE 25 MG: 25 TABLET, FILM COATED ORAL at 23:41

## 2023-12-28 RX ADMIN — LEVOFLOXACIN 750 MG: 5 INJECTION, SOLUTION INTRAVENOUS at 01:55

## 2023-12-28 NOTE — H&P
History and Physical    Date of Service:  12/28/2023  Primary Care Provider: Malgorzata Diaz MD  Source of information: The patient and Chart review    Chief Complaint: Shortness of Breath      History of Presenting Illness:   Juarez Tse is a 78 y.o. female with COPD/chronic hypoxemic respiratory failure on supplemental O2 prn, arthritis, HTN, and HLD who presented to [facility] ED by EMS with SOB and fatigue. EMS found her with SpO2 50% on RA. In the ED, SpO2 was 88% on RA. CT chest showed emphysema, bilateral lower lobe atx, cardiomegaly but no edema and chronic pulmonary hypertension. Rapid COVID/flu swab was negative. Pt was placed on BiPAP and transferred to [facility] ED under  service.     Pt was seen in the ED while on BiPAP. She is lethargic but arousable. She denies any pain but c/o SOB. Pt is hungry and wants to eat.      REVIEW OF SYSTEMS:  Pertinent items are noted in the History of Present Illness.     Past Medical History:   Diagnosis Date    Arthritis 2014    CAD (coronary artery disease)     Chronic obstructive pulmonary disease (HCC) 2016    Hypercholesterolemia     Hypertension       Past Surgical History:   Procedure Laterality Date    HYSTERECTOMY (CERVIX STATUS UNKNOWN)  1983     Prior to Admission medications    Medication Sig Start Date End Date Taking? Authorizing Provider   albuterol sulfate HFA (PROVENTIL;VENTOLIN;PROAIR) 108 (90 Base) MCG/ACT inhaler Inhale 2 puffs into the lungs every 4 hours as needed    Automatic Reconciliation, Ar   alendronate (FOSAMAX) 70 MG tablet Take 1 tablet by mouth every 7 days 8/24/22   Automatic Reconciliation, Ar   amLODIPine (NORVASC) 5 MG tablet Take 5 mg by mouth daily 8/30/14   Automatic Reconciliation, Ar   aspirin 81 MG EC tablet Take by mouth daily    Automatic Reconciliation, Ar   atorvastatin (LIPITOR) 40 MG tablet Take 1 tablet by mouth daily 7/30/22   Automatic Reconciliation, Ar   diclofenac sodium (VOLTAREN) 1 % GEL Apply topically

## 2023-12-28 NOTE — ED NOTES
Bedside and Verbal shift change report given to Timoteo (oncoming nurse) by Denisha (offgoing nurse). Report included the following information Nurse Handoff Report, Index, ED Encounter Summary, ED SBAR, and Adult Overview.

## 2023-12-28 NOTE — PROGRESS NOTES
Occupational Therapy Note:  Orders received and appreciated.  Chart reviewed and spoke with RN.  Pt is currently admitted with acute hypoxic respiratory failure in the setting of COPD.  Pt wears 4L O2 at baseline and is currently on continuous BiPAP.  RN requested to hold therapy today due to current O2 needs.  Will follow up tomorrow.  Thank you for the consult.  Honey Hutchison, OTR/L, CBIS

## 2023-12-28 NOTE — ED NOTES
On cardiac monitor in nurses station the patient's oxygen noted to be 68%. Patient found to have Bipap on top of head. Patient educated that she needs to keep on her BiPap. Patient asked for something to drink and she hates the beeping of the BiPap machine. She was educated that the machine was beeping because she removed it and it was alerting staff of a problem and was told that she cannot have anything to eat or drink because of the dangers of aspiration while using the breathing machine. Patient stated understanding.     Charge made aware, RT called to adjust mask appropriately. Oxygen improved with reapplication. MD paged.    - Robinul 0.4mg IV Q6 hours.  - Scopolamine patch.

## 2023-12-28 NOTE — ED NOTES
TRANSFER - OUT REPORT:    Verbal report given to Tammie Mckinney RN on [facility]  being transferred to [facility] ED for urgent transfer       Report consisted of patient's Situation, Background, Assessment and   Recommendations(SBAR). Information from the following report(s) Nurse Handoff Report, Index, ED Encounter Summary, ED SBAR, Intake/Output, MAR, Recent Results, and Cardiac Rhythm NSR  was reviewed with the receiving nurse. Horton Fall Assessment:    Presents to emergency department  because of falls (Syncope, seizure, or loss of consciousness): No  Age > 70: Yes  Altered Mental Status, Intoxication with alcohol or substance confusion (Disorientation, impaired judgment, poor safety awaremess, or inability to follow instructions): Yes  Impaired Mobility: Ambulates or transfers with assistive devices or assistance; Unable to ambulate or transer.: Yes (Pt uses rolator for walking.)  Nursing Judgement: Yes          Lines:   Peripheral IV 12/27/23 Left;Posterior Hand (Active)        Opportunity for questions and clarification was provided.       Patient transported with:  MADELINE BOLDEN ~1hr

## 2023-12-28 NOTE — ED NOTES
RT called; Pt to have trial on 4 L NC, if pt fails and maintains need for bipap then pt to be transferred.

## 2023-12-28 NOTE — ED TRIAGE NOTES
Pt arrives as a transfer from [facility] for SOB. Pt wears 4L O2 baseline for COPD. Per critical care transport, patient was found by son not wearing O2. Patient does not know how long she has been off O2.     Pt baseline A&Ox1. Pt denies chest pain. Denies SOB on bipap.

## 2023-12-28 NOTE — ED PROVIDER NOTES
[facility] EMERGENCY DEP  EMERGENCY DEPARTMENT ENCOUNTER      Pt Name: Juarez Tse  MRN: [id number]  Birthdate 1945  Date of evaluation: 12/28/2023  Provider: Aubrey Adam MD    CHIEF COMPLAINT       Chief Complaint   Patient presents with    Shortness of Breath         HISTORY OF PRESENT ILLNESS   (Location/Symptom, Timing/Onset, Context/Setting, Quality, Duration, Modifying Factors, Severity)  Note limiting factors.   78-year-old with a history of hypertension, hyperlipidemia, COPD, coronary disease, arthritis.  She presents in transfer from [facility] for respiratory failure.  EMS was called by her son because she was lethargic.  Per report, she has not been wearing her oxygen at home.  She was eventually put on BiPAP.  Her initial ABG showed high CO2.  She is unable to provide any history at this point.          Review of External Medical Records:     Nursing Notes were reviewed.    REVIEW OF SYSTEMS    (2-9 systems for level 4, 10 or more for level 5)     Review of Systems    Except as noted above the remainder of the review of systems was reviewed and negative.       PAST MEDICAL HISTORY     Past Medical History:   Diagnosis Date    Arthritis 2014    CAD (coronary artery disease)     Chronic obstructive pulmonary disease (HCC) 2016    Hypercholesterolemia     Hypertension          SURGICAL HISTORY       Past Surgical History:   Procedure Laterality Date    HYSTERECTOMY (CERVIX STATUS UNKNOWN)  1983         CURRENT MEDICATIONS       Previous Medications    ALBUTEROL SULFATE HFA (PROVENTIL;VENTOLIN;PROAIR) 108 (90 BASE) MCG/ACT INHALER    Inhale 2 puffs into the lungs every 4 hours as needed    ALENDRONATE (FOSAMAX) 70 MG TABLET    Take 1 tablet by mouth every 7 days    AMLODIPINE (NORVASC) 5 MG TABLET    Take 5 mg by mouth daily    ASPIRIN 81 MG EC TABLET    Take by mouth daily    ATORVASTATIN (LIPITOR) 40 MG TABLET    Take 1 tablet by mouth daily    DICLOFENAC SODIUM (VOLTAREN) 1 %

## 2023-12-28 NOTE — ED NOTES
Pt bathed with CHG wipes, linens changed, new gown placed on pt. Pt tolerated well. Pt is A&O to self, in NAD, call light within reach.

## 2023-12-28 NOTE — PROGRESS NOTES
ABG obtained as ordered, results as follows:   Latest Reference Range & Units 12/28/23 02:05   Chris Test -   YES   pH, Arterial 7.35 - 7.45   7.21 (LL)   pCO2, Arterial 35 - 45 mmHg 84 (H)   pO2, Arterial 80 - 100 mmHg 67 (L)   HCO3, Arterial 22 - 26 mmol/L 33 (H)   Base Excess, Arterial mmol/L 2.2   FIO2 Arterial % 30   Site -   RIGHT RADIAL   Mode -   BILEVEL   IPAP/PIP -   12   POC O2 SAT 92 - 97 % 88 (L)   POC PEEP/CPA -   5.0     Pt is currently on BIPAP 12/5 30%.

## 2023-12-28 NOTE — ED NOTES
Per KIMI, no ETA until noon. They dispatched out to [facility] critical care transport for ETA 0830. Primary RN updated.

## 2023-12-28 NOTE — CONSULTS
Pulmonary, Critical Care, and Sleep Medicine~Consult Note    Name: Juarez Tse MRN: [id number]   : 1945 Hospital: [facility]   Date: 2023 11:52 AM Admission: 2023     Impression Plan   Acute hypoxemic and hypercarbic respiratory failure.  COPD exacerbation.  Nicotine dependence.  Pulmonary hypertension; etiology unclear. Agree with IV corticosteroids and empiric antibiotics.  Bronchodilators.  BiPAP 18/6 with FiO2 30% -as needed and at night.  ABG in the morning on BiPAP to see if patient is compensating.  2D echo to assess EF and RVSP.  VQ scan.    Thank you for the consult.     Pulmonary Consultation:    78-year-old female with past medical history of hypertension, hyperlipidemia, COPD, CAD and arthritis who was transferred from [facility] for acute hypercarbic respiratory failure.  Patient presented to [facility] with complaints of lethargy and increased shortness of breath.  Initial ABG showed a pCO2 of more than 80 mmHg.  She was placed on BiPAP and subsequently transferred.    Patient is a poor historian.    Patient described to me that she has been having shortness of breath for the last couple of weeks.  She denies any fever or chills.  She has cough productive of thick mucus.  She is a smoker and continues smoke.  She smokes 1 pack/day for most of her adult life but recently cutdown to half pack per day.  She has wheezing.  She denies chest pain or chest tightness.  She denies lower extremity swelling.    Data reviewed  Creatinine 1.41, proBNP 2714.  WBC 11.5, hemoglobin 15.1, platelet 215.  Influenza A and B negative.  COVID-19 PCR negative.  ABG pH 7.33, pCO2 62, pO2 79.  FiO2 30% BiPAP 18 / 6.  Chest x-ray on 2023 enlarged pulmonary artery.  Clear lungs.  CT chest with IV contrast.  This is not a PE study.  This does not show any pulmonary embolism in the major pulmonary arteries.  Dilated pulmonary artery.  Centrilobular

## 2023-12-29 ENCOUNTER — APPOINTMENT (OUTPATIENT)
Facility: HOSPITAL | Age: 78
DRG: 189 | End: 2023-12-29
Attending: INTERNAL MEDICINE
Payer: MEDICARE

## 2023-12-29 ENCOUNTER — APPOINTMENT (OUTPATIENT)
Facility: HOSPITAL | Age: 78
DRG: 189 | End: 2023-12-29
Payer: MEDICARE

## 2023-12-29 LAB
ALBUMIN SERPL-MCNC: 2.8 G/DL (ref 3.5–5)
ALBUMIN/GLOB SERPL: 0.6 (ref 1.1–2.2)
ALP SERPL-CCNC: 85 U/L (ref 45–117)
ALT SERPL-CCNC: 32 U/L (ref 12–78)
ANION GAP SERPL CALC-SCNC: 9 MMOL/L (ref 5–15)
ARTERIAL PATENCY WRIST A: NO
AST SERPL-CCNC: 54 U/L (ref 15–37)
BASE EXCESS BLDA CALC-SCNC: 1.8 MMOL/L
BASOPHILS # BLD: 0 K/UL (ref 0–0.1)
BASOPHILS NFR BLD: 0 % (ref 0–1)
BDY SITE: ABNORMAL
BILIRUB SERPL-MCNC: 0.7 MG/DL (ref 0.2–1)
BUN SERPL-MCNC: 34 MG/DL (ref 6–20)
BUN/CREAT SERPL: 16 (ref 12–20)
CALCIUM SERPL-MCNC: 9 MG/DL (ref 8.5–10.1)
CHLORIDE SERPL-SCNC: 100 MMOL/L (ref 97–108)
CO2 SERPL-SCNC: 28 MMOL/L (ref 21–32)
CREAT SERPL-MCNC: 2.1 MG/DL (ref 0.55–1.02)
DIFFERENTIAL METHOD BLD: ABNORMAL
ECHO AO ROOT DIAM: 2.6 CM
ECHO AO ROOT INDEX: 1.36 CM/M2
ECHO AV AREA PEAK VELOCITY: 1.2 CM2
ECHO AV AREA VTI: 1.3 CM2
ECHO AV AREA/BSA PEAK VELOCITY: 0.6 CM2/M2
ECHO AV AREA/BSA VTI: 0.7 CM2/M2
ECHO AV MEAN GRADIENT: 16 MMHG
ECHO AV MEAN VELOCITY: 1.9 M/S
ECHO AV PEAK GRADIENT: 28 MMHG
ECHO AV PEAK VELOCITY: 2.7 M/S
ECHO AV VELOCITY RATIO: 0.37
ECHO AV VTI: 51.8 CM
ECHO BSA: 1.92 M2
ECHO EST RA PRESSURE: 3 MMHG
ECHO LA DIAMETER INDEX: 1.52 CM/M2
ECHO LA DIAMETER: 2.9 CM
ECHO LA TO AORTIC ROOT RATIO: 1.12
ECHO LV E' LATERAL VELOCITY: 12 CM/S
ECHO LV E' SEPTAL VELOCITY: 6 CM/S
ECHO LV FRACTIONAL SHORTENING: 36 % (ref 28–44)
ECHO LV INTERNAL DIMENSION DIASTOLE INDEX: 2.04 CM/M2
ECHO LV INTERNAL DIMENSION DIASTOLIC: 3.9 CM (ref 3.9–5.3)
ECHO LV INTERNAL DIMENSION SYSTOLIC INDEX: 1.31 CM/M2
ECHO LV INTERNAL DIMENSION SYSTOLIC: 2.5 CM
ECHO LV IVSD: 1.4 CM (ref 0.6–0.9)
ECHO LV MASS 2D: 179.7 G (ref 67–162)
ECHO LV MASS INDEX 2D: 94.1 G/M2 (ref 43–95)
ECHO LV POSTERIOR WALL DIASTOLIC: 1.2 CM (ref 0.6–0.9)
ECHO LV RELATIVE WALL THICKNESS RATIO: 0.62
ECHO LVOT AREA: 3.1 CM2
ECHO LVOT AV VTI INDEX: 0.42
ECHO LVOT DIAM: 2 CM
ECHO LVOT MEAN GRADIENT: 2 MMHG
ECHO LVOT PEAK GRADIENT: 4 MMHG
ECHO LVOT PEAK VELOCITY: 1 M/S
ECHO LVOT STROKE VOLUME INDEX: 35.3 ML/M2
ECHO LVOT SV: 67.5 ML
ECHO LVOT VTI: 21.5 CM
ECHO MV A VELOCITY: 1.13 M/S
ECHO MV AREA PHT: 2.9 CM2
ECHO MV E DECELERATION TIME (DT): 258.2 MS
ECHO MV E VELOCITY: 0.8 M/S
ECHO MV E/A RATIO: 0.71
ECHO MV E/E' LATERAL: 6.67
ECHO MV E/E' RATIO (AVERAGED): 10
ECHO MV PRESSURE HALF TIME (PHT): 74.9 MS
ECHO PV MAX VELOCITY: 0.7 M/S
ECHO PV PEAK GRADIENT: 2 MMHG
ECHO RIGHT VENTRICULAR SYSTOLIC PRESSURE (RVSP): 37 MMHG
ECHO RV FREE WALL PEAK S': 11 CM/S
ECHO RV TAPSE: 1.2 CM (ref 1.7–?)
ECHO TV REGURGITANT MAX VELOCITY: 2.91 M/S
ECHO TV REGURGITANT PEAK GRADIENT: 34 MMHG
EOSINOPHIL # BLD: 0 K/UL (ref 0–0.4)
EOSINOPHIL NFR BLD: 0 % (ref 0–7)
ERYTHROCYTE [DISTWIDTH] IN BLOOD BY AUTOMATED COUNT: 18.5 % (ref 11.5–14.5)
EST. AVERAGE GLUCOSE BLD GHB EST-MCNC: 151 MG/DL
GAS FLOW.O2 O2 DELIVERY SYS: 3 L/MIN
GLOBULIN SER CALC-MCNC: 4.5 G/DL (ref 2–4)
GLUCOSE BLD STRIP.AUTO-MCNC: 151 MG/DL (ref 65–117)
GLUCOSE BLD STRIP.AUTO-MCNC: 161 MG/DL (ref 65–117)
GLUCOSE BLD STRIP.AUTO-MCNC: 164 MG/DL (ref 65–117)
GLUCOSE BLD STRIP.AUTO-MCNC: 166 MG/DL (ref 65–117)
GLUCOSE SERPL-MCNC: 167 MG/DL (ref 65–100)
HBA1C MFR BLD: 6.9 % (ref 4–5.6)
HCO3 BLDA-SCNC: 27 MMOL/L (ref 22–26)
HCT VFR BLD AUTO: 47.9 % (ref 35–47)
HGB BLD-MCNC: 15.1 G/DL (ref 11.5–16)
IMM GRANULOCYTES # BLD AUTO: 0.1 K/UL (ref 0–0.04)
IMM GRANULOCYTES NFR BLD AUTO: 1 % (ref 0–0.5)
LYMPHOCYTES # BLD: 0.3 K/UL (ref 0.8–3.5)
LYMPHOCYTES NFR BLD: 2 % (ref 12–49)
MAGNESIUM SERPL-MCNC: 2.4 MG/DL (ref 1.6–2.4)
MCH RBC QN AUTO: 26.4 PG (ref 26–34)
MCHC RBC AUTO-ENTMCNC: 31.5 G/DL (ref 30–36.5)
MCV RBC AUTO: 83.6 FL (ref 80–99)
MONOCYTES # BLD: 1.5 K/UL (ref 0–1)
MONOCYTES NFR BLD: 10 % (ref 5–13)
NEUTS SEG # BLD: 12.6 K/UL (ref 1.8–8)
NEUTS SEG NFR BLD: 87 % (ref 32–75)
NRBC # BLD: 0.04 K/UL (ref 0–0.01)
NRBC BLD-RTO: 0.3 PER 100 WBC
PCO2 BLDA: 46 MMHG (ref 35–45)
PH BLDA: 7.39 (ref 7.35–7.45)
PLATELET # BLD AUTO: 239 K/UL (ref 150–400)
PMV BLD AUTO: 11.4 FL (ref 8.9–12.9)
PO2 BLDA: 66 MMHG (ref 80–100)
POTASSIUM SERPL-SCNC: 3.8 MMOL/L (ref 3.5–5.1)
PROT SERPL-MCNC: 7.3 G/DL (ref 6.4–8.2)
RBC # BLD AUTO: 5.73 M/UL (ref 3.8–5.2)
RBC MORPH BLD: ABNORMAL
SAO2 % BLD: 93 % (ref 92–97)
SAO2% DEVICE SAO2% SENSOR NAME: ABNORMAL
SERVICE CMNT-IMP: ABNORMAL
SODIUM SERPL-SCNC: 137 MMOL/L (ref 136–145)
SPECIMEN SITE: ABNORMAL
WBC # BLD AUTO: 14.5 K/UL (ref 3.6–11)

## 2023-12-29 PROCEDURE — 97535 SELF CARE MNGMENT TRAINING: CPT

## 2023-12-29 PROCEDURE — 6360000002 HC RX W HCPCS: Performed by: HOSPITALIST

## 2023-12-29 PROCEDURE — 97530 THERAPEUTIC ACTIVITIES: CPT

## 2023-12-29 PROCEDURE — 93306 TTE W/DOPPLER COMPLETE: CPT

## 2023-12-29 PROCEDURE — 6370000000 HC RX 637 (ALT 250 FOR IP): Performed by: INTERNAL MEDICINE

## 2023-12-29 PROCEDURE — 94640 AIRWAY INHALATION TREATMENT: CPT

## 2023-12-29 PROCEDURE — 94660 CPAP INITIATION&MGMT: CPT

## 2023-12-29 PROCEDURE — 6360000002 HC RX W HCPCS: Performed by: INTERNAL MEDICINE

## 2023-12-29 PROCEDURE — 83735 ASSAY OF MAGNESIUM: CPT

## 2023-12-29 PROCEDURE — 36600 WITHDRAWAL OF ARTERIAL BLOOD: CPT

## 2023-12-29 PROCEDURE — 97116 GAIT TRAINING THERAPY: CPT

## 2023-12-29 PROCEDURE — 85025 COMPLETE CBC W/AUTO DIFF WBC: CPT

## 2023-12-29 PROCEDURE — 2580000003 HC RX 258: Performed by: HOSPITALIST

## 2023-12-29 PROCEDURE — 3430000000 HC RX DIAGNOSTIC RADIOPHARMACEUTICAL: Performed by: PHYSICIAN ASSISTANT

## 2023-12-29 PROCEDURE — A9540 TC99M MAA: HCPCS | Performed by: PHYSICIAN ASSISTANT

## 2023-12-29 PROCEDURE — 2580000003 HC RX 258: Performed by: INTERNAL MEDICINE

## 2023-12-29 PROCEDURE — 83036 HEMOGLOBIN GLYCOSYLATED A1C: CPT

## 2023-12-29 PROCEDURE — 36415 COLL VENOUS BLD VENIPUNCTURE: CPT

## 2023-12-29 PROCEDURE — 97161 PT EVAL LOW COMPLEX 20 MIN: CPT

## 2023-12-29 PROCEDURE — 2060000000 HC ICU INTERMEDIATE R&B

## 2023-12-29 PROCEDURE — 97165 OT EVAL LOW COMPLEX 30 MIN: CPT

## 2023-12-29 PROCEDURE — 80053 COMPREHEN METABOLIC PANEL: CPT

## 2023-12-29 PROCEDURE — 82803 BLOOD GASES ANY COMBINATION: CPT

## 2023-12-29 PROCEDURE — 78580 LUNG PERFUSION IMAGING: CPT

## 2023-12-29 PROCEDURE — 82962 GLUCOSE BLOOD TEST: CPT

## 2023-12-29 PROCEDURE — 93306 TTE W/DOPPLER COMPLETE: CPT | Performed by: SPECIALIST

## 2023-12-29 RX ORDER — SODIUM CHLORIDE 9 MG/ML
INJECTION, SOLUTION INTRAVENOUS CONTINUOUS
Status: DISCONTINUED | OUTPATIENT
Start: 2023-12-29 | End: 2024-01-01

## 2023-12-29 RX ORDER — ENOXAPARIN SODIUM 100 MG/ML
30 INJECTION SUBCUTANEOUS DAILY
Status: DISCONTINUED | OUTPATIENT
Start: 2023-12-30 | End: 2024-01-01

## 2023-12-29 RX ADMIN — WATER 1000 MG: 1 INJECTION INTRAMUSCULAR; INTRAVENOUS; SUBCUTANEOUS at 10:04

## 2023-12-29 RX ADMIN — ASPIRIN 81 MG: 81 TABLET, COATED ORAL at 10:03

## 2023-12-29 RX ADMIN — METHYLPREDNISOLONE SODIUM SUCCINATE 40 MG: 40 INJECTION, POWDER, FOR SOLUTION INTRAMUSCULAR; INTRAVENOUS at 06:15

## 2023-12-29 RX ADMIN — ENOXAPARIN SODIUM 40 MG: 100 INJECTION SUBCUTANEOUS at 10:04

## 2023-12-29 RX ADMIN — METHYLPREDNISOLONE SODIUM SUCCINATE 40 MG: 40 INJECTION, POWDER, FOR SOLUTION INTRAMUSCULAR; INTRAVENOUS at 15:26

## 2023-12-29 RX ADMIN — METOPROLOL TARTRATE 25 MG: 25 TABLET, FILM COATED ORAL at 23:32

## 2023-12-29 RX ADMIN — METHYLPREDNISOLONE SODIUM SUCCINATE 40 MG: 40 INJECTION, POWDER, FOR SOLUTION INTRAMUSCULAR; INTRAVENOUS at 23:32

## 2023-12-29 RX ADMIN — Medication 10 ML: at 23:50

## 2023-12-29 RX ADMIN — GABAPENTIN 300 MG: 600 TABLET, FILM COATED ORAL at 15:26

## 2023-12-29 RX ADMIN — METHYLPREDNISOLONE SODIUM SUCCINATE 40 MG: 40 INJECTION, POWDER, FOR SOLUTION INTRAMUSCULAR; INTRAVENOUS at 10:04

## 2023-12-29 RX ADMIN — Medication 10 ML: at 10:03

## 2023-12-29 RX ADMIN — IPRATROPIUM BROMIDE AND ALBUTEROL SULFATE 1 DOSE: 2.5; .5 SOLUTION RESPIRATORY (INHALATION) at 11:59

## 2023-12-29 RX ADMIN — CYANOCOBALAMIN TAB 500 MCG 1000 MCG: 500 TAB at 10:02

## 2023-12-29 RX ADMIN — GABAPENTIN 300 MG: 600 TABLET, FILM COATED ORAL at 23:32

## 2023-12-29 RX ADMIN — GABAPENTIN 300 MG: 600 TABLET, FILM COATED ORAL at 10:03

## 2023-12-29 RX ADMIN — IPRATROPIUM BROMIDE AND ALBUTEROL SULFATE 1 DOSE: 2.5; .5 SOLUTION RESPIRATORY (INHALATION) at 21:50

## 2023-12-29 RX ADMIN — ARFORMOTEROL TARTRATE: 15 SOLUTION RESPIRATORY (INHALATION) at 21:50

## 2023-12-29 RX ADMIN — AMLODIPINE BESYLATE 5 MG: 5 TABLET ORAL at 10:03

## 2023-12-29 RX ADMIN — PANTOPRAZOLE SODIUM 40 MG: 40 TABLET, DELAYED RELEASE ORAL at 06:15

## 2023-12-29 RX ADMIN — GUAIFENESIN 600 MG: 600 TABLET, EXTENDED RELEASE ORAL at 23:32

## 2023-12-29 RX ADMIN — KIT FOR THE PREPARATION OF TECHNETIUM TC 99M ALBUMIN AGGREGATED 4.4 MILLICURIE: 2.5 INJECTION, POWDER, FOR SOLUTION INTRAVENOUS at 13:29

## 2023-12-29 RX ADMIN — ATORVASTATIN CALCIUM 40 MG: 10 TABLET, FILM COATED ORAL at 10:03

## 2023-12-29 RX ADMIN — SODIUM CHLORIDE: 9 INJECTION, SOLUTION INTRAVENOUS at 12:00

## 2023-12-29 RX ADMIN — ONDANSETRON 4 MG: 2 INJECTION INTRAMUSCULAR; INTRAVENOUS at 23:47

## 2023-12-29 RX ADMIN — IPRATROPIUM BROMIDE AND ALBUTEROL SULFATE 1 DOSE: 2.5; .5 SOLUTION RESPIRATORY (INHALATION) at 15:25

## 2023-12-29 RX ADMIN — METOPROLOL TARTRATE 25 MG: 25 TABLET, FILM COATED ORAL at 10:02

## 2023-12-29 RX ADMIN — AZITHROMYCIN DIHYDRATE 500 MG: 250 TABLET, FILM COATED ORAL at 10:02

## 2023-12-29 RX ADMIN — ARFORMOTEROL TARTRATE: 15 SOLUTION RESPIRATORY (INHALATION) at 12:01

## 2023-12-29 RX ADMIN — GUAIFENESIN 600 MG: 600 TABLET, EXTENDED RELEASE ORAL at 10:02

## 2023-12-29 RX ADMIN — DULOXETINE HYDROCHLORIDE 60 MG: 60 CAPSULE, DELAYED RELEASE ORAL at 10:03

## 2023-12-29 NOTE — PROGRESS NOTES
Progress note  Cameron Gordon MD  Date of Service:  12/29/2023      Admission H&P   \"Juarez Tse is a 78 y.o. female with COPD/chronic hypoxemic respiratory failure on supplemental O2 prn, arthritis, HTN, and HLD who presented to [facility] ED by EMS with SOB and fatigue. EMS found her with SpO2 50% on RA. In the ED, SpO2 was 88% on RA. CT chest showed emphysema, bilateral lower lobe atx, cardiomegaly but no edema and chronic pulmonary hypertension. Rapid COVID/flu swab was negative. Pt was placed on BiPAP and transferred to [facility] ED under  service.\"   Subjective/interval history  --Patient was taken off BiPAP briefly for exam.  She did not display respiratory difficulty, SpO2 did not go below 94%.  She is alert and oriented, no complaints.  I  left her back on BiPAP.      Assessment/Plan:     Acute on chronic hypoxic and hypercapnic respiratory failure  Chronic centrilobular emphysema/COPD  Pulmonary arterial hypertension, noted on CT chest  - NPPV, wean as able, n.p.o. while on BiPAP.  - rapid COVID/flu swab negative, RVP negative  - troponin negative x2  - check RVP  -Continue bronchodilators, IV steroid with Solu-Medrol, add inhaled rates.    GUERO creatinine trending up  -Continue maintenance IV fluid.    Mildly elevated AST, trending down  - likely due to dehydration      Afebrile leucocytosis, worsening due to steroid  RVP negative.  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  Please disregard these errors.  Please excuse any errors that have escaped final proofreading.

## 2023-12-29 NOTE — PROGRESS NOTES
Pulmonary, Critical Care, and Sleep Medicine~Progress Note    Name: Juarez Tse MRN: [id number]   : 1945 Hospital: [facility]   Date: 2023 12:06 PM Admission: 2023     Impression Plan   Acute hypoxemic and hypercarbic respiratory failure.  COPD exacerbation.  Nicotine dependence.  Pulmonary hypertension; etiology unclear. Agree with IV corticosteroids and empiric antibiotics.  Bronchodilators.  NIV qhs   2D echo to assess EF and RVSP. pEnding   VQ scan today        Pulmonary Consultation:      Abg looks ok       78-year-old female with past medical history of hypertension, hyperlipidemia, COPD, CAD and arthritis who was transferred from [facility] for acute hypercarbic respiratory failure.  Patient presented to [facility] with complaints of lethargy and increased shortness of breath.  Initial ABG showed a pCO2 of more than 80 mmHg.  She was placed on BiPAP and subsequently transferred.    Patient is a poor historian.    Patient described to me that she has been having shortness of breath for the last couple of weeks.  She denies any fever or chills.  She has cough productive of thick mucus.  She is a smoker and continues smoke.  She smokes 1 pack/day for most of her adult life but recently cutdown to half pack per day.  She has wheezing.  She denies chest pain or chest tightness.  She denies lower extremity swelling.    Data reviewed  Creatinine 1.41, proBNP 2714.  WBC 11.5, hemoglobin 15.1, platelet 215.  Influenza A and B negative.  COVID-19 PCR negative.  ABG pH 7.33, pCO2 62, pO2 79.  FiO2 30% BiPAP 18 / 6.  Chest x-ray on 2023 enlarged pulmonary artery.  Clear lungs.  CT chest with IV contrast.  This is not a PE study.  This does not show any pulmonary embolism in the major pulmonary arteries.  Dilated pulmonary artery.  Centrilobular emphysema.  Bilateral basal subsegmental atelectasis.    I have reviewed the labs and  ulcers, no mottling    Neuro: A/O x 3        Medications:  Current Facility-Administered Medications   Medication Dose Route Frequency    arformoterol 15 mcg-budesonide 0.5 mg neb solution   Nebulization BID RT    0.9 % sodium chloride infusion   IntraVENous Continuous    sodium chloride flush 0.9 % injection 5-40 mL  5-40 mL IntraVENous 2 times per day    sodium chloride flush 0.9 % injection 5-40 mL  5-40 mL IntraVENous PRN    0.9 % sodium chloride infusion   IntraVENous PRN    ondansetron (ZOFRAN-ODT) disintegrating tablet 4 mg  4 mg Oral Q8H PRN    Or    ondansetron (ZOFRAN) injection 4 mg  4 mg IntraVENous Q6H PRN    polyethylene glycol (GLYCOLAX) packet 17 g  17 g Oral Daily PRN    enoxaparin (LOVENOX) injection 40 mg  40 mg SubCUTAneous Daily    cefTRIAXone (ROCEPHIN) 1,000 mg in sterile water 10 mL IV syringe  1,000 mg IntraVENous Q24H    azithromycin (ZITHROMAX) tablet 500 mg  500 mg Oral Daily    ipratropium 0.5 mg-albuterol 2.5 mg (DUONEB) nebulizer solution 1 Dose  1 Dose Inhalation Q4H WA RT    guaiFENesin (MUCINEX) extended release tablet 600 mg  600 mg Oral BID    methylPREDNISolone sodium (PF) (SOLU-MEDROL PF) injection 40 mg  40 mg IntraVENous Q6H    Followed by    [START ON 12/30/2023] predniSONE (DELTASONE) tablet 40 mg  40 mg Oral Daily    insulin lispro (HUMALOG) injection vial 0-4 Units  0-4 Units SubCUTAneous Q6H    insulin lispro (HUMALOG) injection vial 0-4 Units  0-4 Units SubCUTAneous Nightly    glucose chewable tablet 16 g  4 tablet Oral PRN    dextrose bolus 10% 125 mL  125 mL IntraVENous PRN    Or    dextrose bolus 10% 250 mL  250 mL IntraVENous PRN    glucagon injection 1 mg  1 mg SubCUTAneous PRN    dextrose 10 % infusion   IntraVENous Continuous PRN    [Held by provider] furosemide (LASIX) injection 20 mg  20 mg IntraVENous BID    diazePAM (VALIUM) injection 2.5 mg  2.5 mg IntraVENous Once    amLODIPine (NORVASC) tablet 5 mg  5 mg Oral Daily    aspirin EC tablet 81 mg  81 mg Oral

## 2023-12-29 NOTE — PROGRESS NOTES
Lovenox Monitoring  Indication: DVT Prophylaxis  Recent Labs     12/27/23 2025 12/29/23  0041   HGB 15.1 15.1    239       Current Weight: 75 kg  Est. CrCl = 23 ml/min  Current Dose: 40 mg subcutaneously every 24 hours.  Plan: Change to enoxaparin 30 mg Q24H for crcl 15-30 ml/min

## 2023-12-29 NOTE — PROGRESS NOTES
Orders received, chart reviewed and patient evaluated by physical therapy. Pending progression with skilled acute physical therapy, recommend:  Continue to assess pending progress. At this time SNF is most appropriate.     Recommend with nursing OOB to chair 3x/day and walking daily with one assist and rolling walker. Thank you for completing as able in order to maintain patient strength, endurance and independence.     Full evaluation to follow.

## 2023-12-29 NOTE — PLAN OF CARE
Problem: Physical Therapy - Adult  Goal: By Discharge: Performs mobility at highest level of function for planned discharge setting.  See evaluation for individualized goals.  Description: FUNCTIONAL STATUS PRIOR TO ADMISSION: Patient was modified independent using a rollator for functional mobility.    HOME SUPPORT PRIOR TO ADMISSION: The patient lived alone.    Physical Therapy Goals  Initiated 12/29/2023  1.  Patient will move from supine to sit and sit to supine in bed with modified independence within 7 day(s).    2.  Patient will perform sit to stand with modified independence within 7 day(s).  3.  Patient will transfer from bed to chair and chair to bed with modified independence using the least restrictive device within 7 day(s).  4.  Patient will ambulate with modified independence for 150 feet with the least restrictive device within 7 day(s).          PHYSICAL THERAPY EVALUATION    Patient: Juarez Tse (78 y.o. female)  Date: 12/29/2023  Primary Diagnosis: Acute on chronic respiratory failure with hypoxia and hypercapnia (HCC) [J96.21, J96.22]       Precautions: Fall Risk                    ASSESSMENT :   The patient is limited by decreased strength, activity tolerance, posture, endurance, safety awareness, attention/concentration, balance, and increased SOB.  Received pt on the ER mat on 3L NCO2 (On BiPAP 30% FiO2 earlier today), SpO2 90's.   At baseline pt is independent with ADLs and ambulating with a rollator walker, on 4L O2 PRN, lives alone.  Today she is generally Min A transitioning positions in the bed, sit<->stand and walking with the RW.  She ambulated 80 ft with forward flexed posture with cues and assist needed to improve walker safety christi with sit<->stand transfers, bowel hygiene in standing, and washing up at the sink.   Pt was mildly dyspneic with activity, required seated rest break x3 when working on bowel hygiene.  SpO2 >90% with activity while on 3L.      Pt is below her

## 2023-12-29 NOTE — PROGRESS NOTES
2000 Report received from KEELY Tellez. Patient alert and oriented, resting in stretcher. Pt on Bipap 30% requesting food. Pt notified she was NPO due to the Bipap. Swallow eval done and ice water given with medications. Medications reconciled in chart.    Shift summary.    0040 Labs drawn, CHG bath given post incontinent episode.    Sips of water and ice chips provided periodically.    No acute events noted. Pt resting in bed comfortably, call bell left within reach.    0730 Bedside shift report given to KEELY Lucas by KEELY Garrett. Report included the following information SBAR, Kardex, MAR, Accordion, and Recent Results and Cardiac Rhythm NSR.

## 2023-12-29 NOTE — PLAN OF CARE
Problem: Occupational Therapy - Adult  Goal: By Discharge: Performs self-care activities at highest level of function for planned discharge setting.  See evaluation for individualized goals.  Description: FUNCTIONAL STATUS PRIOR TO ADMISSION:     ,  ,  ,  ,  ,  ,  ,  ,  ,  ,       HOME SUPPORT: Patient lived alone with no local support at \[facility]\"". She has 2 sons out of town who assist with transportation occasionally    Occupational Therapy Goals:  Initiated 12/29/2023  1.  Patient will perform grooming in standing with Supervision within 7 day(s).  2.  Patient will perform anterior neck to thigh bathing with Stand by Assist and Set-up within 7 day(s).  3.  Patient will perform lower body dressing with Minimal Assist within 7 day(s).  4.  Patient will perform toilet transfers with Stand by Assist  within 7 day(s).  5.  Patient will perform all aspects of toileting with Minimal Assist within 7 day(s).  6.  Patient will participate in upper extremity therapeutic exercise/activities with Stand by Assist for 5 minutes within 7 day(s).    7.  Patient will utilize energy conservation techniques during functional activities with verbal and visual cues within 7 day(s).    Outcome: Progressing   OCCUPATIONAL THERAPY EVALUATION    Patient: Juarez Tse (78 y.o. female)  Date: 12/29/2023  Primary Diagnosis: Acute on chronic respiratory failure with hypoxia and hypercapnia (HCC) [J96.21, J96.22]         Precautions:                    ASSESSMENT :  The patient is limited by decreased functional mobility, independence in ADLs, high-level IADLs, endurance, safety awareness, balance, posture, and increased supplemental O2 need. At baseline she resides alone and is Mod I with rollator for mobility and has up to 4L O2 at home which she reports using PRN. Pt admitted with hypoxia with initial need for BiPap now transitioned to 3L NC. Pt with occasional desat to high 80s with mobility and self care activity prompting rest breaks.    Pt highly distractible and requires frequent cueing to stay on task and persist with tasks. Min A for bed mobility, Min Ax1-2 for transfers. Decreased LB reach for clothing management and LB dressing requiring increased assist from baseline. Soiled in bed on arrival with pt able to complete bowel hygiene in standing with Min A for balance with RW and Mod A for hygiene.   Based on the impairments listed above patient is below her Mod I baseline and will continue to benefit from skilled therapy while inpatient. At this time recommend SNF level rehab if to DC over weekend.    Functional Outcome Measure:  The patient scored 17/24 on the [facility] outcome measure which is indicative of increased likelihood for need for SNF/IPR at discharge.         PLAN :  Recommendations and Planned Interventions:   self care training, therapeutic activities, functional mobility training, balance training, therapeutic exercise, endurance activities, patient education, and home safety training    Frequency/Duration: OT Plan of Care: 5 times/week    Recommendation for discharge: (in order for the patient to meet his/her long term goals): Therapy up to 5 days/week in Skilled nursing facility vs home with HH pending progress, tolerance, and respiratory status    Other factors to consider for discharge: lives alone, no local support, patient's current support system is unable to meet their requirements for physical assistance, poor safety awareness, high risk for falls, and concern for safely navigating or managing the home environment    IF patient discharges home will need the following DME: patient owns DME required for discharge       SUBJECTIVE:   Patient stated, “I fall in November and I had to crawl to the door and then a man put me back up on my couch.”  OBJECTIVE DATA SUMMARY:     Past Medical History:   Diagnosis Date    Arthritis 2014    CAD (coronary artery disease)     Chronic obstructive pulmonary disease (HCC) 2016

## 2023-12-30 ENCOUNTER — APPOINTMENT (OUTPATIENT)
Facility: HOSPITAL | Age: 78
DRG: 189 | End: 2023-12-30
Payer: MEDICARE

## 2023-12-30 LAB
ALBUMIN SERPL-MCNC: 2.6 G/DL (ref 3.5–5)
ALBUMIN/GLOB SERPL: 0.7 (ref 1.1–2.2)
ALP SERPL-CCNC: 68 U/L (ref 45–117)
ALT SERPL-CCNC: 31 U/L (ref 12–78)
ANION GAP SERPL CALC-SCNC: 8 MMOL/L (ref 5–15)
ANION GAP SERPL CALC-SCNC: 9 MMOL/L (ref 5–15)
APPEARANCE UR: ABNORMAL
ARTERIAL PATENCY WRIST A: POSITIVE
ARTERIAL PATENCY WRIST A: YES
AST SERPL-CCNC: 37 U/L (ref 15–37)
BACTERIA URNS QL MICRO: ABNORMAL /HPF
BASE DEFICIT BLDA-SCNC: 1.1 MMOL/L
BASE EXCESS BLDV CALC-SCNC: 0.6 MMOL/L
BASOPHILS # BLD: 0 K/UL (ref 0–0.1)
BASOPHILS NFR BLD: 0 % (ref 0–1)
BDY SITE: ABNORMAL
BDY SITE: ABNORMAL
BILIRUB SERPL-MCNC: 0.6 MG/DL (ref 0.2–1)
BILIRUB UR QL: NEGATIVE
BUN SERPL-MCNC: 60 MG/DL (ref 6–20)
BUN SERPL-MCNC: 62 MG/DL (ref 6–20)
BUN/CREAT SERPL: 18 (ref 12–20)
BUN/CREAT SERPL: 19 (ref 12–20)
CALCIUM SERPL-MCNC: 8.3 MG/DL (ref 8.5–10.1)
CALCIUM SERPL-MCNC: 8.5 MG/DL (ref 8.5–10.1)
CHLORIDE SERPL-SCNC: 100 MMOL/L (ref 97–108)
CHLORIDE SERPL-SCNC: 98 MMOL/L (ref 97–108)
CO2 SERPL-SCNC: 26 MMOL/L (ref 21–32)
CO2 SERPL-SCNC: 28 MMOL/L (ref 21–32)
COLOR UR: YELLOW
COMMENT:: NORMAL
CREAT SERPL-MCNC: 3.32 MG/DL (ref 0.55–1.02)
CREAT SERPL-MCNC: 3.4 MG/DL (ref 0.55–1.02)
DIFFERENTIAL METHOD BLD: ABNORMAL
EOSINOPHIL # BLD: 0 K/UL (ref 0–0.4)
EOSINOPHIL NFR BLD: 0 % (ref 0–7)
EPITH CASTS URNS QL MICRO: ABNORMAL /LPF
ERYTHROCYTE [DISTWIDTH] IN BLOOD BY AUTOMATED COUNT: 17.9 % (ref 11.5–14.5)
GAS FLOW.O2 O2 DELIVERY SYS: 4 L/MIN
GAS FLOW.O2 O2 DELIVERY SYS: ABNORMAL
GLOBULIN SER CALC-MCNC: 4 G/DL (ref 2–4)
GLUCOSE BLD STRIP.AUTO-MCNC: 115 MG/DL (ref 65–117)
GLUCOSE BLD STRIP.AUTO-MCNC: 116 MG/DL (ref 65–117)
GLUCOSE BLD STRIP.AUTO-MCNC: 121 MG/DL (ref 65–117)
GLUCOSE BLD STRIP.AUTO-MCNC: 132 MG/DL (ref 65–117)
GLUCOSE SERPL-MCNC: 146 MG/DL (ref 65–100)
GLUCOSE SERPL-MCNC: 149 MG/DL (ref 65–100)
GLUCOSE UR STRIP.AUTO-MCNC: NEGATIVE MG/DL
GRAN CASTS URNS QL MICRO: ABNORMAL /LPF
HCO3 BLDA-SCNC: 26 MMOL/L (ref 22–26)
HCO3 BLDV-SCNC: 27.8 MMOL/L (ref 23–28)
HCT VFR BLD AUTO: 47.8 % (ref 35–47)
HGB BLD-MCNC: 14.8 G/DL (ref 11.5–16)
HGB UR QL STRIP: ABNORMAL
IMM GRANULOCYTES # BLD AUTO: 0.1 K/UL (ref 0–0.04)
IMM GRANULOCYTES NFR BLD AUTO: 1 % (ref 0–0.5)
KETONES UR QL STRIP.AUTO: NEGATIVE MG/DL
LEUKOCYTE ESTERASE UR QL STRIP.AUTO: ABNORMAL
LYMPHOCYTES # BLD: 0.3 K/UL (ref 0.8–3.5)
LYMPHOCYTES NFR BLD: 2 % (ref 12–49)
MAGNESIUM SERPL-MCNC: 2.6 MG/DL (ref 1.6–2.4)
MCH RBC QN AUTO: 25.9 PG (ref 26–34)
MCHC RBC AUTO-ENTMCNC: 31 G/DL (ref 30–36.5)
MCV RBC AUTO: 83.6 FL (ref 80–99)
MONOCYTES # BLD: 1.1 K/UL (ref 0–1)
MONOCYTES NFR BLD: 8 % (ref 5–13)
NEUTS SEG # BLD: 12.3 K/UL (ref 1.8–8)
NEUTS SEG NFR BLD: 89 % (ref 32–75)
NITRITE UR QL STRIP.AUTO: NEGATIVE
NRBC # BLD: 0.08 K/UL (ref 0–0.01)
NRBC BLD-RTO: 0.6 PER 100 WBC
PCO2 BLDA: 54 MMHG (ref 35–45)
PCO2 BLDV: 52.3 MMHG (ref 41–51)
PH BLDA: 7.3 (ref 7.35–7.45)
PH BLDV: 7.33 (ref 7.32–7.42)
PH UR STRIP: 5 (ref 5–8)
PLATELET # BLD AUTO: 246 K/UL (ref 150–400)
PMV BLD AUTO: 11.1 FL (ref 8.9–12.9)
PO2 BLDA: 78 MMHG (ref 80–100)
PO2 BLDV: 39 MMHG (ref 25–40)
POTASSIUM SERPL-SCNC: 4 MMOL/L (ref 3.5–5.1)
POTASSIUM SERPL-SCNC: 4.2 MMOL/L (ref 3.5–5.1)
PROT SERPL-MCNC: 6.6 G/DL (ref 6.4–8.2)
PROT UR STRIP-MCNC: 30 MG/DL
RBC # BLD AUTO: 5.72 M/UL (ref 3.8–5.2)
RBC #/AREA URNS HPF: >100 /HPF (ref 0–5)
RBC MORPH BLD: ABNORMAL
SAO2 % BLD: 94 % (ref 92–97)
SAO2 % BLDV: 68.9 % (ref 65–88)
SAO2% DEVICE SAO2% SENSOR NAME: ABNORMAL
SERVICE CMNT-IMP: ABNORMAL
SERVICE CMNT-IMP: NORMAL
SERVICE CMNT-IMP: NORMAL
SODIUM SERPL-SCNC: 133 MMOL/L (ref 136–145)
SODIUM SERPL-SCNC: 136 MMOL/L (ref 136–145)
SP GR UR REFRACTOMETRY: 1.01 (ref 1–1.03)
SPECIMEN HOLD: NORMAL
SPECIMEN SITE: ABNORMAL
SPECIMEN TYPE: ABNORMAL
UROBILINOGEN UR QL STRIP.AUTO: 0.2 EU/DL (ref 0.2–1)
WBC # BLD AUTO: 13.8 K/UL (ref 3.6–11)
WBC URNS QL MICRO: ABNORMAL /HPF (ref 0–4)

## 2023-12-30 PROCEDURE — 6370000000 HC RX 637 (ALT 250 FOR IP): Performed by: INTERNAL MEDICINE

## 2023-12-30 PROCEDURE — 6370000000 HC RX 637 (ALT 250 FOR IP): Performed by: HOSPITALIST

## 2023-12-30 PROCEDURE — 85025 COMPLETE CBC W/AUTO DIFF WBC: CPT

## 2023-12-30 PROCEDURE — 2580000003 HC RX 258: Performed by: INTERNAL MEDICINE

## 2023-12-30 PROCEDURE — 2700000000 HC OXYGEN THERAPY PER DAY

## 2023-12-30 PROCEDURE — 81001 URINALYSIS AUTO W/SCOPE: CPT

## 2023-12-30 PROCEDURE — 6360000002 HC RX W HCPCS: Performed by: HOSPITALIST

## 2023-12-30 PROCEDURE — 82803 BLOOD GASES ANY COMBINATION: CPT

## 2023-12-30 PROCEDURE — 2580000003 HC RX 258: Performed by: HOSPITALIST

## 2023-12-30 PROCEDURE — 2060000000 HC ICU INTERMEDIATE R&B

## 2023-12-30 PROCEDURE — 94660 CPAP INITIATION&MGMT: CPT

## 2023-12-30 PROCEDURE — 36415 COLL VENOUS BLD VENIPUNCTURE: CPT

## 2023-12-30 PROCEDURE — 6360000002 HC RX W HCPCS: Performed by: INTERNAL MEDICINE

## 2023-12-30 PROCEDURE — 82962 GLUCOSE BLOOD TEST: CPT

## 2023-12-30 PROCEDURE — 36600 WITHDRAWAL OF ARTERIAL BLOOD: CPT

## 2023-12-30 PROCEDURE — 94640 AIRWAY INHALATION TREATMENT: CPT

## 2023-12-30 PROCEDURE — 70450 CT HEAD/BRAIN W/O DYE: CPT

## 2023-12-30 PROCEDURE — 80053 COMPREHEN METABOLIC PANEL: CPT

## 2023-12-30 PROCEDURE — 83735 ASSAY OF MAGNESIUM: CPT

## 2023-12-30 PROCEDURE — 76770 US EXAM ABDO BACK WALL COMP: CPT

## 2023-12-30 RX ORDER — GABAPENTIN 600 MG/1
300 TABLET ORAL 2 TIMES DAILY
Status: DISCONTINUED | OUTPATIENT
Start: 2023-12-30 | End: 2024-01-06 | Stop reason: HOSPADM

## 2023-12-30 RX ORDER — 0.9 % SODIUM CHLORIDE 0.9 %
500 INTRAVENOUS SOLUTION INTRAVENOUS ONCE
Status: COMPLETED | OUTPATIENT
Start: 2023-12-30 | End: 2023-12-30

## 2023-12-30 RX ADMIN — METHYLPREDNISOLONE SODIUM SUCCINATE 60 MG: 125 INJECTION, POWDER, FOR SOLUTION INTRAMUSCULAR; INTRAVENOUS at 20:44

## 2023-12-30 RX ADMIN — METOPROLOL TARTRATE 25 MG: 25 TABLET, FILM COATED ORAL at 20:48

## 2023-12-30 RX ADMIN — WATER 1000 MG: 1 INJECTION INTRAMUSCULAR; INTRAVENOUS; SUBCUTANEOUS at 11:45

## 2023-12-30 RX ADMIN — IPRATROPIUM BROMIDE AND ALBUTEROL SULFATE 1 DOSE: 2.5; .5 SOLUTION RESPIRATORY (INHALATION) at 07:42

## 2023-12-30 RX ADMIN — IPRATROPIUM BROMIDE AND ALBUTEROL SULFATE 1 DOSE: 2.5; .5 SOLUTION RESPIRATORY (INHALATION) at 16:30

## 2023-12-30 RX ADMIN — SODIUM CHLORIDE: 9 INJECTION, SOLUTION INTRAVENOUS at 01:05

## 2023-12-30 RX ADMIN — GABAPENTIN 300 MG: 600 TABLET, FILM COATED ORAL at 20:44

## 2023-12-30 RX ADMIN — SODIUM CHLORIDE 500 ML: 9 INJECTION, SOLUTION INTRAVENOUS at 10:00

## 2023-12-30 RX ADMIN — ARFORMOTEROL TARTRATE: 15 SOLUTION RESPIRATORY (INHALATION) at 20:43

## 2023-12-30 RX ADMIN — IPRATROPIUM BROMIDE AND ALBUTEROL SULFATE 1 DOSE: 2.5; .5 SOLUTION RESPIRATORY (INHALATION) at 11:02

## 2023-12-30 RX ADMIN — PANTOPRAZOLE SODIUM 40 MG: 40 TABLET, DELAYED RELEASE ORAL at 06:19

## 2023-12-30 RX ADMIN — GUAIFENESIN 600 MG: 600 TABLET, EXTENDED RELEASE ORAL at 20:45

## 2023-12-30 RX ADMIN — ARFORMOTEROL TARTRATE: 15 SOLUTION RESPIRATORY (INHALATION) at 07:41

## 2023-12-30 RX ADMIN — METHYLPREDNISOLONE SODIUM SUCCINATE 40 MG: 40 INJECTION, POWDER, FOR SOLUTION INTRAMUSCULAR; INTRAVENOUS at 06:19

## 2023-12-30 RX ADMIN — IPRATROPIUM BROMIDE AND ALBUTEROL SULFATE 1 DOSE: 2.5; .5 SOLUTION RESPIRATORY (INHALATION) at 20:43

## 2023-12-30 NOTE — PROGRESS NOTES
Care assumed report received pt laying in bed with eyes closed no distress noted call bell within reach new IV bag hung     0247  Pump beeping straightened arm IV infusing without difficulty     0339  Attempts to draw lab unsuccessful will get another RN       0619  Pt given med without difficulty voiced no needs at this time     0817  Report given to Reji RIGGS including SBAR opportunity given to ask questions

## 2023-12-30 NOTE — ED NOTES
TRANSFER - OUT REPORT:    Verbal report given to Constanza on Juarez Tse  being transferred to [facility] for routine progression of patient care       Report consisted of patient's Situation, Background, Assessment and   Recommendations(SBAR).     Information from the following report(s) Nurse Handoff Report, ED SBAR, MAR, and Recent Results was reviewed with the receiving nurse.    [place] Fall Assessment:    Presents to emergency department  because of falls (Syncope, seizure, or loss of consciousness): No  Age > 70: Yes  Altered Mental Status, Intoxication with alcohol or substance confusion (Disorientation, impaired judgment, poor safety awaremess, or inability to follow instructions): Yes  Impaired Mobility: Ambulates or transfers with assistive devices or assistance; Unable to ambulate or transer.: No  Nursing Judgement: Yes          Lines:   Peripheral IV 12/28/23 Right Antecubital (Active)   Site Assessment Clean, dry & intact 12/30/23 0105   Line Status Infusing 12/30/23 0105   Line Care Connections checked and tightened 12/30/23 0105   Phlebitis Assessment No symptoms 12/30/23 0105   Infiltration Assessment 0 12/30/23 0105   Alcohol Cap Used Yes 12/30/23 0105   Dressing Status Clean, dry & intact 12/30/23 0105   Dressing Type Transparent 12/30/23 0105        Opportunity for questions and clarification was provided.

## 2023-12-30 NOTE — PROGRESS NOTES
for the following components:    Hemoglobin A1C 6.9 (*)     All other components within normal limits   BLOOD GAS, ARTERIAL - Abnormal; Notable for the following components:    pCO2, Arterial 46 (*)     pO2, Arterial 66 (*)     HCO3, Arterial 27 (*)     All other components within normal limits   COMPREHENSIVE METABOLIC PANEL - Abnormal; Notable for the following components:    Sodium 133 (*)     Glucose 149 (*)     BUN 60 (*)     Creatinine 3.40 (*)     Est, Glom Filt Rate 13 (*)     Albumin 2.6 (*)     Albumin/Globulin Ratio 0.7 (*)     All other components within normal limits   MAGNESIUM - Abnormal; Notable for the following components:    Magnesium 2.6 (*)     All other components within normal limits   CBC WITH AUTO DIFFERENTIAL - Abnormal; Notable for the following components:    WBC 13.8 (*)     RBC 5.72 (*)     Hematocrit 47.8 (*)     MCH 25.9 (*)     RDW 17.9 (*)     Nucleated RBCs 0.6 (*)     nRBC 0.08 (*)     Neutrophils % 89 (*)     Lymphocytes % 2 (*)     Immature Granulocytes 1 (*)     Neutrophils Absolute 12.3 (*)     Lymphocytes Absolute 0.3 (*)     Monocytes Absolute 1.1 (*)     Absolute Immature Granulocyte 0.1 (*)     All other components within normal limits   ARTERIAL BLOOD GAS, POC - Abnormal; Notable for the following components:    POC pH 7.33 (*)     POC pCO2 62.6 (*)     POC PO2 79 (*)     POC HCO3 33.0 (*)     All other components within normal limits   POCT GLUCOSE - Abnormal; Notable for the following components:    POC Glucose 192 (*)     All other components within normal limits   POCT GLUCOSE - Abnormal; Notable for the following components:    POC Glucose 216 (*)     All other components within normal limits   POCT GLUCOSE - Abnormal; Notable for the following components:    POC Glucose 147 (*)     All other components within normal limits   POCT GLUCOSE - Abnormal; Notable for the following components:    POC Glucose 166 (*)     All other components within normal limits   POCT

## 2023-12-30 NOTE — PROGRESS NOTES
Renal Dosing/Monitoring  Medication: Gabapentin   Current regimen:  300 mg PO TID  Recent Labs     12/29/23  0041 12/30/23  0350 12/30/23  1039   CREATININE 2.10* 3.40* 3.32*   BUN 34* 60* 62*     Estimated CrCl:  17 ml/min  Plan: Change to 300 mg PO BID per [facility] P&T Committee Protocol with respect to renal function.  Pharmacy will continue to monitor patient daily and will make dosage adjustments based upon changing renal function.

## 2023-12-30 NOTE — CONSULTS
which may vary by analyte.   POCT Glucose    Collection Time: 12/30/23  6:26 AM   Result Value Ref Range    POC Glucose 121 (H) 65 - 117 mg/dL    Performed by: JENNA Pineda    POCT Glucose    Collection Time: 12/30/23 10:07 AM   Result Value Ref Range    POC Glucose 132 (H) 65 - 117 mg/dL    Performed by: KIMBERLYN Mendoza    Basic Metabolic Panel    Collection Time: 12/30/23 10:39 AM   Result Value Ref Range    Sodium 136 136 - 145 mmol/L    Potassium 4.0 3.5 - 5.1 mmol/L    Chloride 100 97 - 108 mmol/L    CO2 28 21 - 32 mmol/L    Anion Gap 8 5 - 15 mmol/L    Glucose 146 (H) 65 - 100 mg/dL    BUN 62 (H) 6 - 20 MG/DL    Creatinine 3.32 (H) 0.55 - 1.02 MG/DL    Bun/Cre Ratio 19 12 - 20      Est, Glom Filt Rate 14 (L) >60 ml/min/1.73m2    Calcium 8.3 (L) 8.5 - 10.1 MG/DL   Venous Blood Gas, POC    Collection Time: 12/30/23 10:46 AM   Result Value Ref Range    DEVICE NASAL CANNULA      PH, VENOUS (POC) 7.33 7.32 - 7.42      PCO2, Karla, POC 52.3 (H) 41 - 51 MMHG    PO2, VENOUS (POC) 39 25 - 40 mmHg    HCO3, Venous 27.8 23.0 - 28.0 MMOL/L    SO2, VENOUS (POC) 68.9 65 - 88 %    BASE EXCESS, VENOUS (POC) 0.6 mmol/L    POC Chris's Test Positive      Site RIGHT RADIAL      Specimen type: VENOUS BLOOD      Performed by: ASHA SHAY         Lab Results   Component Value Date    CREATININE 3.32 (H) 12/30/2023    BUN 62 (H) 12/30/2023     12/30/2023    K 4.0 12/30/2023     12/30/2023    CO2 28 12/30/2023       Lab Results   Component Value Date    CREATININE 3.32 (H) 12/30/2023    CREATININE 3.40 (H) 12/30/2023    CREATININE 2.10 (H) 12/29/2023    CREATININE 1.41 (H) 12/27/2023    CREATININE 0.74 08/29/2022    BUN 62 (H) 12/30/2023    BUN 60 (H) 12/30/2023    BUN 34 (H) 12/29/2023    BUN 22 (H) 12/27/2023    BUN 22 (H) 08/29/2022    K 4.0 12/30/2023    K 4.2 12/30/2023    K 3.8 12/29/2023    K 3.7 12/27/2023    K 4.4 08/29/2022       Lab Results   Component Value Date    WBC 13.8 (H) 12/30/2023    RBC 5.72 (H) 12/30/2023     HGB 14.8 12/30/2023    HCT 47.8 (H) 12/30/2023    MCV 83.6 12/30/2023    MCH 25.9 (L) 12/30/2023    RDW 17.9 (H) 12/30/2023     12/30/2023       Lab Results   Component Value Date    CALCIUM 8.3 (L) 12/30/2023    PHOS 2.6 08/29/2022       Urine dipstick:   No components found for: \"COLOR\", \"APPRN\", \"SPGRU\", \"REFSG\", \"PETER\", \"PROTU\", \"GLUCU\", \"KETU\", \"BILU\", \"UROU\", \"MARIAJOSE\", \"LEUKU\", \"GLUKE\", \"EPSU\", \"BACTU\", \"WBCU\", \"RBCU\", \"CASTS\", \"UCRY\"    I have reviewed the following:   All pertinent labs, microbiology data, radiology imaging for my assessment     ECG- Rev: Yes / No  Xray/CT/US/MRI REV: Yes/ No    Care Plan discussed with:  ER nurse     Chart reviewed.  Total time spent with patient including JANIA:  45 min  Medications list Personally Reviewed   [x]      Yes     []               No      Thank you for allowing us to participate in the care of this patient.   We will follow patient. Please don’t hesitate to call with any questions    VIRAL MYLES MD  12/30/2023

## 2023-12-30 NOTE — PROGRESS NOTES
.              Name: Juarez Tse  YOB: 1945  MRN: [id number]  Admission Date: 12/28/2023  9:13 AM    Date of service: 12/30/2023  Came to reassess patient informed by RN that patient has become lethargic.She was transferred to the ER on BiPAP.Most recent ABG looked good and she is placed on NC 4 l/min satting 100%        Physical Exam:   Patient is sleepy but could be awoken and responds appropriately and follows commands.She says she did not sleep for the last 3-4 days.Denied any complaints.      Vitals  BP 00940; HR 76, RR 16,spo2 9% on 4 l/min via NS    Lungs clear with globally diminished air entry.No in distress  CVS: s1 and s2 well heard ,  Abd/DIDI :soft ,non tender,oro catheter in place draining clear yellow urin  CNS:sleepy or lethargic,arousable ,responds appropriately,answered orientation questions    No facial droop or dysarthria   Moved all extremities symmetrically,non focal    ABG      Data Reviewed:   All diagnostic labs and studies have been reviewed.    A/p  Lethargic,oriented.Low suspicion for acute neurologic processes.  A/C hypoxic and hypercarbic RF  GUERO  Obstructive uropathy     --Placed back on BiPAP  --Head CT,stat  --BMP/Ammonia  --Monitor closely in IMCU  --Discussed with KEELY Gordon MD  12/30/2023  6:11 PM

## 2023-12-30 NOTE — ED NOTES
Straight cath per MD orders with [facility] at this time--output of 1,200 Ml of urine at this time--Dr. Gordon informed via perfect serve.

## 2023-12-31 LAB
ALBUMIN SERPL-MCNC: 2.8 G/DL (ref 3.5–5)
AMMONIA PLAS-SCNC: 27 UMOL/L
ANION GAP SERPL CALC-SCNC: 5 MMOL/L (ref 5–15)
ANION GAP SERPL CALC-SCNC: 8 MMOL/L (ref 5–15)
BASOPHILS # BLD: 0 K/UL (ref 0–0.1)
BASOPHILS NFR BLD: 0 % (ref 0–1)
BUN SERPL-MCNC: 52 MG/DL (ref 6–20)
BUN SERPL-MCNC: 53 MG/DL (ref 6–20)
BUN/CREAT SERPL: 27 (ref 12–20)
BUN/CREAT SERPL: 27 (ref 12–20)
CALCIUM SERPL-MCNC: 8.5 MG/DL (ref 8.5–10.1)
CALCIUM SERPL-MCNC: 8.9 MG/DL (ref 8.5–10.1)
CHLORIDE SERPL-SCNC: 105 MMOL/L (ref 97–108)
CHLORIDE SERPL-SCNC: 105 MMOL/L (ref 97–108)
CO2 SERPL-SCNC: 26 MMOL/L (ref 21–32)
CO2 SERPL-SCNC: 27 MMOL/L (ref 21–32)
CREAT SERPL-MCNC: 1.94 MG/DL (ref 0.55–1.02)
CREAT SERPL-MCNC: 1.96 MG/DL (ref 0.55–1.02)
DIFFERENTIAL METHOD BLD: ABNORMAL
EOSINOPHIL # BLD: 0 K/UL (ref 0–0.4)
EOSINOPHIL NFR BLD: 0 % (ref 0–7)
ERYTHROCYTE [DISTWIDTH] IN BLOOD BY AUTOMATED COUNT: 18.5 % (ref 11.5–14.5)
GLUCOSE BLD STRIP.AUTO-MCNC: 134 MG/DL (ref 65–117)
GLUCOSE BLD STRIP.AUTO-MCNC: 166 MG/DL (ref 65–117)
GLUCOSE BLD STRIP.AUTO-MCNC: 227 MG/DL (ref 65–117)
GLUCOSE BLD STRIP.AUTO-MCNC: 272 MG/DL (ref 65–117)
GLUCOSE SERPL-MCNC: 121 MG/DL (ref 65–100)
GLUCOSE SERPL-MCNC: 122 MG/DL (ref 65–100)
HCT VFR BLD AUTO: 49.6 % (ref 35–47)
HGB BLD-MCNC: 15.1 G/DL (ref 11.5–16)
IMM GRANULOCYTES # BLD AUTO: 0.1 K/UL (ref 0–0.04)
IMM GRANULOCYTES NFR BLD AUTO: 1 % (ref 0–0.5)
LYMPHOCYTES # BLD: 0.2 K/UL (ref 0.8–3.5)
LYMPHOCYTES NFR BLD: 2 % (ref 12–49)
MCH RBC QN AUTO: 25.9 PG (ref 26–34)
MCHC RBC AUTO-ENTMCNC: 30.4 G/DL (ref 30–36.5)
MCV RBC AUTO: 84.9 FL (ref 80–99)
MONOCYTES # BLD: 0.6 K/UL (ref 0–1)
MONOCYTES NFR BLD: 5 % (ref 5–13)
NEUTS SEG # BLD: 11.1 K/UL (ref 1.8–8)
NEUTS SEG NFR BLD: 92 % (ref 32–75)
NRBC # BLD: 0.04 K/UL (ref 0–0.01)
NRBC BLD-RTO: 0.3 PER 100 WBC
PHOSPHATE SERPL-MCNC: 3.7 MG/DL (ref 2.6–4.7)
PLATELET # BLD AUTO: 251 K/UL (ref 150–400)
PMV BLD AUTO: 11.6 FL (ref 8.9–12.9)
POTASSIUM SERPL-SCNC: 4.3 MMOL/L (ref 3.5–5.1)
POTASSIUM SERPL-SCNC: 4.3 MMOL/L (ref 3.5–5.1)
RBC # BLD AUTO: 5.84 M/UL (ref 3.8–5.2)
RBC MORPH BLD: ABNORMAL
RBC MORPH BLD: ABNORMAL
SERVICE CMNT-IMP: ABNORMAL
SODIUM SERPL-SCNC: 137 MMOL/L (ref 136–145)
SODIUM SERPL-SCNC: 139 MMOL/L (ref 136–145)
WBC # BLD AUTO: 12 K/UL (ref 3.6–11)

## 2023-12-31 PROCEDURE — 6370000000 HC RX 637 (ALT 250 FOR IP): Performed by: INTERNAL MEDICINE

## 2023-12-31 PROCEDURE — 80048 BASIC METABOLIC PNL TOTAL CA: CPT

## 2023-12-31 PROCEDURE — 6360000002 HC RX W HCPCS: Performed by: INTERNAL MEDICINE

## 2023-12-31 PROCEDURE — 6370000000 HC RX 637 (ALT 250 FOR IP): Performed by: HOSPITALIST

## 2023-12-31 PROCEDURE — 2580000003 HC RX 258: Performed by: INTERNAL MEDICINE

## 2023-12-31 PROCEDURE — 94640 AIRWAY INHALATION TREATMENT: CPT

## 2023-12-31 PROCEDURE — 6360000002 HC RX W HCPCS: Performed by: HOSPITALIST

## 2023-12-31 PROCEDURE — 36415 COLL VENOUS BLD VENIPUNCTURE: CPT

## 2023-12-31 PROCEDURE — 82962 GLUCOSE BLOOD TEST: CPT

## 2023-12-31 PROCEDURE — 2060000000 HC ICU INTERMEDIATE R&B

## 2023-12-31 PROCEDURE — 2700000000 HC OXYGEN THERAPY PER DAY

## 2023-12-31 PROCEDURE — 85025 COMPLETE CBC W/AUTO DIFF WBC: CPT

## 2023-12-31 PROCEDURE — 80069 RENAL FUNCTION PANEL: CPT

## 2023-12-31 PROCEDURE — 82140 ASSAY OF AMMONIA: CPT

## 2023-12-31 RX ADMIN — SODIUM CHLORIDE: 9 INJECTION, SOLUTION INTRAVENOUS at 12:00

## 2023-12-31 RX ADMIN — GABAPENTIN 300 MG: 600 TABLET, FILM COATED ORAL at 09:41

## 2023-12-31 RX ADMIN — ENOXAPARIN SODIUM 30 MG: 100 INJECTION SUBCUTANEOUS at 09:41

## 2023-12-31 RX ADMIN — DULOXETINE HYDROCHLORIDE 60 MG: 60 CAPSULE, DELAYED RELEASE ORAL at 09:41

## 2023-12-31 RX ADMIN — METOPROLOL TARTRATE 25 MG: 25 TABLET, FILM COATED ORAL at 21:50

## 2023-12-31 RX ADMIN — IPRATROPIUM BROMIDE AND ALBUTEROL SULFATE 1 DOSE: 2.5; .5 SOLUTION RESPIRATORY (INHALATION) at 17:00

## 2023-12-31 RX ADMIN — ARFORMOTEROL TARTRATE: 15 SOLUTION RESPIRATORY (INHALATION) at 20:34

## 2023-12-31 RX ADMIN — WATER 1000 MG: 1 INJECTION INTRAMUSCULAR; INTRAVENOUS; SUBCUTANEOUS at 09:41

## 2023-12-31 RX ADMIN — INSULIN LISPRO 2 UNITS: 100 INJECTION, SOLUTION INTRAVENOUS; SUBCUTANEOUS at 16:09

## 2023-12-31 RX ADMIN — ATORVASTATIN CALCIUM 40 MG: 10 TABLET, FILM COATED ORAL at 09:41

## 2023-12-31 RX ADMIN — METHYLPREDNISOLONE SODIUM SUCCINATE 60 MG: 125 INJECTION, POWDER, FOR SOLUTION INTRAMUSCULAR; INTRAVENOUS at 04:27

## 2023-12-31 RX ADMIN — IPRATROPIUM BROMIDE AND ALBUTEROL SULFATE 1 DOSE: 2.5; .5 SOLUTION RESPIRATORY (INHALATION) at 20:34

## 2023-12-31 RX ADMIN — METHYLPREDNISOLONE SODIUM SUCCINATE 60 MG: 125 INJECTION, POWDER, FOR SOLUTION INTRAMUSCULAR; INTRAVENOUS at 18:57

## 2023-12-31 RX ADMIN — ARFORMOTEROL TARTRATE: 15 SOLUTION RESPIRATORY (INHALATION) at 07:35

## 2023-12-31 RX ADMIN — METOPROLOL TARTRATE 25 MG: 25 TABLET, FILM COATED ORAL at 09:41

## 2023-12-31 RX ADMIN — GUAIFENESIN 600 MG: 600 TABLET, EXTENDED RELEASE ORAL at 09:41

## 2023-12-31 RX ADMIN — INSULIN LISPRO 1 UNITS: 100 INJECTION, SOLUTION INTRAVENOUS; SUBCUTANEOUS at 21:58

## 2023-12-31 RX ADMIN — CYANOCOBALAMIN TAB 500 MCG 1000 MCG: 500 TAB at 09:41

## 2023-12-31 RX ADMIN — IPRATROPIUM BROMIDE AND ALBUTEROL SULFATE 1 DOSE: 2.5; .5 SOLUTION RESPIRATORY (INHALATION) at 12:52

## 2023-12-31 RX ADMIN — ASPIRIN 81 MG: 81 TABLET, COATED ORAL at 09:41

## 2023-12-31 RX ADMIN — IPRATROPIUM BROMIDE AND ALBUTEROL SULFATE 1 DOSE: 2.5; .5 SOLUTION RESPIRATORY (INHALATION) at 07:35

## 2023-12-31 RX ADMIN — PANTOPRAZOLE SODIUM 40 MG: 40 TABLET, DELAYED RELEASE ORAL at 06:00

## 2023-12-31 RX ADMIN — GABAPENTIN 300 MG: 600 TABLET, FILM COATED ORAL at 21:50

## 2023-12-31 RX ADMIN — SODIUM CHLORIDE, PRESERVATIVE FREE 10 ML: 5 INJECTION INTRAVENOUS at 09:42

## 2023-12-31 RX ADMIN — METHYLPREDNISOLONE SODIUM SUCCINATE 60 MG: 125 INJECTION, POWDER, FOR SOLUTION INTRAMUSCULAR; INTRAVENOUS at 09:41

## 2023-12-31 RX ADMIN — GUAIFENESIN 600 MG: 600 TABLET, EXTENDED RELEASE ORAL at 21:50

## 2023-12-31 ASSESSMENT — PAIN SCALES - GENERAL: PAINLEVEL_OUTOF10: 0

## 2023-12-31 NOTE — PLAN OF CARE
Problem: Skin/Tissue Integrity  Goal: Absence of new skin breakdown  Description: 1.  Monitor for areas of redness and/or skin breakdown  2.  Assess vascular access sites hourly  3.  Every 4-6 hours minimum:  Change oxygen saturation probe site  4.  Every 4-6 hours:  If on nasal continuous positive airway pressure, respiratory therapy assess nares and determine need for appliance change or resting period.  Outcome: /\[facility]\"" Progressing     Problem: Discharge Planning  Goal: Discharge to home or other facility with appropriate resources  Outcome: [facility]     Problem: Safety - Adult  Goal: Free from fall injury  Outcome: /[facility] Progressing

## 2023-12-31 NOTE — PROGRESS NOTES
1510: received pt from ED accompanied by Reji, RN and RT. Pt on 4 L NC, O2 sat 99%, all other vitals stable, no complaints of pain, alert and oriented x 4 with some periodic confusion.  Pt gown and pad change. Received orders for regular diet as pt off bipap. MD to order ABG tomorrow AM.     Pt provided with water and call bell, was hungry but wanted to wait for dinner.     1745: this RN in pts room to obtain blood glucose. Pt noticeable more lethargic, awakes to voice/touch but unable to remain awake for long. Still satting 99% on 4L NC no signs of distress.   MD notified.     1800: MD in pts room to assess pt. Able to follow commands and answer questions but still lethargic.   Labs, head CT, and ABG ordered.    1834: ABG resulted. This RN called respiratory to put on bipap.  Pt extremely hard stick. Unable to obtain labs after multiple sticks.   Night RN notified about labs and pending CT. Pt to be kept on bipap overnight and have ABG in AM

## 2023-12-31 NOTE — PROGRESS NOTES
[facility]  Jamee Ryan MD  [phone]            Renal Progress Note    NAME:  Juarez Tse   :   1945   MRN:   [id number]     Date/Time:  2023 10:49 AM            DISCUSSION / PLAN :      Assessment:     GUERO probably due to urinary tract obstruction-also on NSAIDs  -UA with RBC and WBC. Hematuria Might be traumatic due to oro, can not rule out UTI  BL hydronephrosis due to AYERS  COPD  Hypercapnic resp failure-improved  pulmonary HTN  HTN  DM2  CAD  EF 55-60%, diastolic dysfunction  UTI?           Plan:  Oro-can try to remove and see if she can void  C/w IVF 50 ml/hr  Continue to hold lasix  Agree with holding lisinopril  Is and Os  No NSAIDs  F/u renal function test  Abx per primary team        Discussed with Dr Gordon           ___________________________________________________  Subjective:       Awake and alert today, on NC. Cr down      Medications reviewed:  Current Facility-Administered Medications   Medication Dose Route Frequency    gabapentin (NEURONTIN) tablet 300 mg  300 mg Oral BID    methylPREDNISolone sodium (PF) (SOLU-MEDROL PF) injection 60 mg  60 mg IntraVENous Q8H    arformoterol 15 mcg-budesonide 0.5 mg neb solution   Nebulization BID RT    0.9 % sodium chloride infusion   IntraVENous Continuous    enoxaparin Sodium (LOVENOX) injection 30 mg  30 mg SubCUTAneous Daily    sodium chloride flush 0.9 % injection 5-40 mL  5-40 mL IntraVENous PRN    0.9 % sodium chloride infusion   IntraVENous PRN    ondansetron (ZOFRAN-ODT) disintegrating tablet 4 mg  4 mg Oral Q8H PRN    Or    ondansetron (ZOFRAN) injection 4 mg  4 mg IntraVENous Q6H PRN    polyethylene glycol (GLYCOLAX) packet 17 g  17 g Oral Daily PRN    cefTRIAXone (ROCEPHIN) 1,000 mg in sterile water 10 mL IV syringe  1,000 mg IntraVENous Q24H    ipratropium 0.5 mg-albuterol 2.5 mg (DUONEB) nebulizer solution 1 Dose  1 Dose Inhalation Q4H WA RT    guaiFENesin (MUCINEX) extended release tablet 600 mg  600 mg Oral  Nucleated RBCs 0.3 (H) 0  WBC    nRBC 0.04 (H) 0.00 - 0.01 K/uL    Neutrophils % 92 (H) 32 - 75 %    Lymphocytes % 2 (L) 12 - 49 %    Monocytes % 5 5 - 13 %    Eosinophils % 0 0 - 7 %    Basophils % 0 0 - 1 %    Immature Granulocytes 1 (H) 0.0 - 0.5 %    Neutrophils Absolute 11.1 (H) 1.8 - 8.0 K/UL    Lymphocytes Absolute 0.2 (L) 0.8 - 3.5 K/UL    Monocytes Absolute 0.6 0.0 - 1.0 K/UL    Eosinophils Absolute 0.0 0.0 - 0.4 K/UL    Basophils Absolute 0.0 0.0 - 0.1 K/UL    Absolute Immature Granulocyte 0.1 (H) 0.00 - 0.04 K/UL    Differential Type SMEAR SCANNED      RBC Comment TARGET CELLS  WILL CELLS  PRESENT        RBC Comment OVALOCYTES  PRESENT       Basic Metabolic Panel w/ Reflex to MG    Collection Time: 12/31/23  2:39 AM   Result Value Ref Range    Sodium 137 136 - 145 mmol/L    Potassium 4.3 3.5 - 5.1 mmol/L    Chloride 105 97 - 108 mmol/L    CO2 27 21 - 32 mmol/L    Anion Gap 5 5 - 15 mmol/L    Glucose 122 (H) 65 - 100 mg/dL    BUN 52 (H) 6 - 20 MG/DL    Creatinine 1.96 (H) 0.55 - 1.02 MG/DL    Bun/Cre Ratio 27 (H) 12 - 20      Est, Glom Filt Rate 26 (L) >60 ml/min/1.73m2    Calcium 8.9 8.5 - 10.1 MG/DL   Renal Function Panel    Collection Time: 12/31/23  2:39 AM   Result Value Ref Range    Sodium 139 136 - 145 mmol/L    Potassium 4.3 3.5 - 5.1 mmol/L    Chloride 105 97 - 108 mmol/L    CO2 26 21 - 32 mmol/L    Anion Gap 8 5 - 15 mmol/L    Glucose 121 (H) 65 - 100 mg/dL    BUN 53 (H) 6 - 20 MG/DL    Creatinine 1.94 (H) 0.55 - 1.02 MG/DL    Bun/Cre Ratio 27 (H) 12 - 20      Est, Glom Filt Rate 26 (L) >60 ml/min/1.73m2    Calcium 8.5 8.5 - 10.1 MG/DL    Phosphorus 3.7 2.6 - 4.7 MG/DL    Albumin 2.8 (L) 3.5 - 5.0 g/dL   Ammonia    Collection Time: 12/31/23  2:39 AM   Result Value Ref Range    Ammonia 27 <32 UMOL/L   POCT Glucose    Collection Time: 12/31/23  4:25 AM   Result Value Ref Range    POC Glucose 134 (H) 65 - 117 mg/dL    Performed by: VINI Elkins    POCT Glucose    Collection Time: 12/31/23

## 2023-12-31 NOTE — PROGRESS NOTES
Progress note  Brayan Lobo MD  Date of Service:  12/31/2023      Admission H&P   \"Juarez Tse is a 78 y.o. female with COPD/chronic hypoxemic respiratory failure on supplemental O2 prn, arthritis, HTN, and HLD who presented to [facility] ED by EMS with SOB and fatigue. EMS found her with SpO2 50% on RA. In the ED, SpO2 was 88% on RA. CT chest showed emphysema, bilateral lower lobe atx, cardiomegaly but no edema and chronic pulmonary hypertension. Rapid COVID/flu swab was negative. Pt was placed on BiPAP and transferred to [facility] ED under  service.\"   Subjective/interval history  Follow up respiratory failure      Assessment/Plan:     Acute on chronic hypoxic and hypercapnic respiratory failure  Chronic centrilobular emphysema/COPD  Pulmonary arterial hypertension, noted on CT chest  -Off BiPAP since yesterday, on nasal cannula.  Repeat ABG, if CO2 compensated, downgrade from intermediate care to telemetry bed.  - rapid COVID/flu swab negative, RVP negative  - troponin negative x2  -Continue bronchodilators, IV steroid with Solu-Medrol, add inhaled rates.    GUERO creatinine improving  -?post-renal  -now on oro, will do voiding trial 12/31  -Appreciate Nephrology    Mildly elevated AST, trending down  - likely due to dehydration      Afebrile leucocytosis, worsening due to steroid  RVP negative.  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   Ambulatory status/function: Ambulates with assistance:  Walker   EARLY MOBILITY ASSESSMENT:  Glucose 151 (*)     All other components within normal limits   POCT GLUCOSE - Abnormal; Notable for the following components:    POC Glucose 121 (*)     All other components within normal limits   POCT GLUCOSE - Abnormal; Notable for the following components:    POC Glucose 132 (*)     All other components within normal limits   VENOUS BLOOD GAS, POINT OF CARE - Abnormal; Notable for the following components:    PCO2, Karla, POC 52.3 (*)     All other components within normal limits   POCT GLUCOSE - Abnormal; Notable for the following components:    POC Glucose 134 (*)     All other components within normal limits   POCT GLUCOSE - Abnormal; Notable for the following components:    POC Glucose 166 (*)     All other components within normal limits   POCT GLUCOSE - Abnormal; Notable for the following components:    POC Glucose 272 (*)     All other components within normal limits       IMAGING:   CT HEAD WO CONTRAST   Final Result   No acute findings.      US RETROPERITONEAL COMPLETE   Final Result   Mild bilateral hydronephrosis and a distended urinary bladder.   Increased renal parenchymal echogenicity compatible with medical renal disease.      NM LUNG SCAN PERFUSION ONLY   Final Result   1. Very low probability for pulmonary emboli.            Notes reviewed from all clinical/nonclinical/nursing services involved in patient's clinical care. Care coordination discussions were held with appropriate clinical/nonclinical/ nursing providers based on care coordination needs.     Signed By: Brayan Lobo MD     December 31, 2023         Please note that this dictation may have been completed with Dragon, the computer voice recognition software.  Quite often unanticipated grammatical, syntax, homophones, and other interpretive errors are inadvertently transcribed by the computer software.  Please disregard these errors.  Please excuse any errors that have escaped final proofreading.

## 2023-12-31 NOTE — PROGRESS NOTES
12/30/23 2302   NIV Type   $NIV $Daily Charge   NIV Started/Stopped On   Equipment Type V60   Mode Bilevel   Mask Type Full face mask   Mask Size Medium   Assessment   Pulse 67   Respirations 17   SpO2 98 %   Settings/Measurements   PIP Observed 20 cm H20   IPAP 20 cmH20   CPAP/EPAP 6 cmH2O   Vt (Measured) 536 mL   Rate Ordered 15   Insp Rise Time (%) 3 %   FiO2  30 %   I Time/ I Time % 1 s   Minute Volume (L/min) 9.2 Liters   Mask Leak (lpm) 2 lpm   Patient's Home Machine No   Alarm Settings   Alarms On Y   Low Pressure (cmH2O) 5 cmH2O   High Pressure (cmH2O) 30 cmH2O   Apnea (secs) 20 secs   RR Low (bpm) 8   RR High (bpm) 40 br/min   Patient Transport   Time Spent Transporting 16-30   Transport Ventillation Type Noninvasive   Transport From Tele  ([facility])   Transport Destination CT scan   Transport Destination Tele  (CU)

## 2024-01-01 LAB
ALBUMIN SERPL-MCNC: 2.5 G/DL (ref 3.5–5)
ANION GAP SERPL CALC-SCNC: 5 MMOL/L (ref 5–15)
BUN SERPL-MCNC: 40 MG/DL (ref 6–20)
BUN/CREAT SERPL: 35 (ref 12–20)
CALCIUM SERPL-MCNC: 8.6 MG/DL (ref 8.5–10.1)
CHLORIDE SERPL-SCNC: 108 MMOL/L (ref 97–108)
CO2 SERPL-SCNC: 24 MMOL/L (ref 21–32)
CREAT SERPL-MCNC: 1.13 MG/DL (ref 0.55–1.02)
GLUCOSE BLD STRIP.AUTO-MCNC: 152 MG/DL (ref 65–117)
GLUCOSE BLD STRIP.AUTO-MCNC: 162 MG/DL (ref 65–117)
GLUCOSE BLD STRIP.AUTO-MCNC: 172 MG/DL (ref 65–117)
GLUCOSE BLD STRIP.AUTO-MCNC: 185 MG/DL (ref 65–117)
GLUCOSE BLD STRIP.AUTO-MCNC: 213 MG/DL (ref 65–117)
GLUCOSE BLD STRIP.AUTO-MCNC: 250 MG/DL (ref 65–117)
GLUCOSE SERPL-MCNC: 238 MG/DL (ref 65–100)
PHOSPHATE SERPL-MCNC: 2.1 MG/DL (ref 2.6–4.7)
POTASSIUM SERPL-SCNC: 4.4 MMOL/L (ref 3.5–5.1)
SERVICE CMNT-IMP: ABNORMAL
SODIUM SERPL-SCNC: 137 MMOL/L (ref 136–145)

## 2024-01-01 PROCEDURE — 6370000000 HC RX 637 (ALT 250 FOR IP): Performed by: INTERNAL MEDICINE

## 2024-01-01 PROCEDURE — 2580000003 HC RX 258: Performed by: INTERNAL MEDICINE

## 2024-01-01 PROCEDURE — 6360000002 HC RX W HCPCS: Performed by: INTERNAL MEDICINE

## 2024-01-01 PROCEDURE — 2060000000 HC ICU INTERMEDIATE R&B

## 2024-01-01 PROCEDURE — 80069 RENAL FUNCTION PANEL: CPT

## 2024-01-01 PROCEDURE — 82962 GLUCOSE BLOOD TEST: CPT

## 2024-01-01 PROCEDURE — 94640 AIRWAY INHALATION TREATMENT: CPT

## 2024-01-01 PROCEDURE — 6370000000 HC RX 637 (ALT 250 FOR IP): Performed by: HOSPITALIST

## 2024-01-01 PROCEDURE — 36415 COLL VENOUS BLD VENIPUNCTURE: CPT

## 2024-01-01 PROCEDURE — 2700000000 HC OXYGEN THERAPY PER DAY

## 2024-01-01 PROCEDURE — 6360000002 HC RX W HCPCS: Performed by: HOSPITALIST

## 2024-01-01 RX ORDER — IPRATROPIUM BROMIDE AND ALBUTEROL SULFATE 2.5; .5 MG/3ML; MG/3ML
1 SOLUTION RESPIRATORY (INHALATION)
Status: DISCONTINUED | OUTPATIENT
Start: 2024-01-01 | End: 2024-01-06 | Stop reason: HOSPADM

## 2024-01-01 RX ORDER — GUAIFENESIN 600 MG/1
600 TABLET, EXTENDED RELEASE ORAL 2 TIMES DAILY
Qty: 20 TABLET | Refills: 1 | Status: SHIPPED | OUTPATIENT
Start: 2024-01-01

## 2024-01-01 RX ORDER — IPRATROPIUM BROMIDE AND ALBUTEROL SULFATE 2.5; .5 MG/3ML; MG/3ML
3 SOLUTION RESPIRATORY (INHALATION)
Qty: 360 ML | Refills: 1 | Status: SHIPPED | OUTPATIENT
Start: 2024-01-01

## 2024-01-01 RX ORDER — AMLODIPINE BESYLATE 5 MG/1
5 TABLET ORAL DAILY
Status: DISCONTINUED | OUTPATIENT
Start: 2024-01-01 | End: 2024-01-02

## 2024-01-01 RX ORDER — PREDNISONE 20 MG/1
60 TABLET ORAL DAILY
Qty: 30 TABLET | Refills: 0 | Status: SHIPPED | OUTPATIENT
Start: 2024-01-01 | End: 2024-01-11

## 2024-01-01 RX ORDER — ENOXAPARIN SODIUM 100 MG/ML
40 INJECTION SUBCUTANEOUS DAILY
Status: DISCONTINUED | OUTPATIENT
Start: 2024-01-02 | End: 2024-01-06 | Stop reason: HOSPADM

## 2024-01-01 RX ADMIN — METHYLPREDNISOLONE SODIUM SUCCINATE 60 MG: 125 INJECTION, POWDER, FOR SOLUTION INTRAMUSCULAR; INTRAVENOUS at 08:39

## 2024-01-01 RX ADMIN — SODIUM CHLORIDE, PRESERVATIVE FREE 10 ML: 5 INJECTION INTRAVENOUS at 08:39

## 2024-01-01 RX ADMIN — GABAPENTIN 300 MG: 600 TABLET, FILM COATED ORAL at 22:21

## 2024-01-01 RX ADMIN — PANTOPRAZOLE SODIUM 40 MG: 40 TABLET, DELAYED RELEASE ORAL at 06:27

## 2024-01-01 RX ADMIN — ARFORMOTEROL TARTRATE: 15 SOLUTION RESPIRATORY (INHALATION) at 08:03

## 2024-01-01 RX ADMIN — IPRATROPIUM BROMIDE AND ALBUTEROL SULFATE 1 DOSE: 2.5; .5 SOLUTION RESPIRATORY (INHALATION) at 08:03

## 2024-01-01 RX ADMIN — AMLODIPINE BESYLATE 5 MG: 5 TABLET ORAL at 11:26

## 2024-01-01 RX ADMIN — METHYLPREDNISOLONE SODIUM SUCCINATE 60 MG: 125 INJECTION, POWDER, FOR SOLUTION INTRAMUSCULAR; INTRAVENOUS at 17:01

## 2024-01-01 RX ADMIN — GABAPENTIN 300 MG: 600 TABLET, FILM COATED ORAL at 08:38

## 2024-01-01 RX ADMIN — DULOXETINE HYDROCHLORIDE 60 MG: 60 CAPSULE, DELAYED RELEASE ORAL at 08:38

## 2024-01-01 RX ADMIN — IPRATROPIUM BROMIDE AND ALBUTEROL SULFATE 1 DOSE: 2.5; .5 SOLUTION RESPIRATORY (INHALATION) at 20:29

## 2024-01-01 RX ADMIN — SODIUM CHLORIDE: 9 INJECTION, SOLUTION INTRAVENOUS at 08:38

## 2024-01-01 RX ADMIN — INSULIN LISPRO 1 UNITS: 100 INJECTION, SOLUTION INTRAVENOUS; SUBCUTANEOUS at 03:43

## 2024-01-01 RX ADMIN — WATER 1000 MG: 1 INJECTION INTRAMUSCULAR; INTRAVENOUS; SUBCUTANEOUS at 10:15

## 2024-01-01 RX ADMIN — METOPROLOL TARTRATE 25 MG: 25 TABLET, FILM COATED ORAL at 22:21

## 2024-01-01 RX ADMIN — CYANOCOBALAMIN TAB 500 MCG 1000 MCG: 500 TAB at 08:38

## 2024-01-01 RX ADMIN — ARFORMOTEROL TARTRATE: 15 SOLUTION RESPIRATORY (INHALATION) at 20:29

## 2024-01-01 RX ADMIN — ENOXAPARIN SODIUM 30 MG: 100 INJECTION SUBCUTANEOUS at 08:39

## 2024-01-01 RX ADMIN — METOPROLOL TARTRATE 25 MG: 25 TABLET, FILM COATED ORAL at 08:38

## 2024-01-01 RX ADMIN — METHYLPREDNISOLONE SODIUM SUCCINATE 60 MG: 125 INJECTION, POWDER, FOR SOLUTION INTRAMUSCULAR; INTRAVENOUS at 03:32

## 2024-01-01 RX ADMIN — GUAIFENESIN 600 MG: 600 TABLET, EXTENDED RELEASE ORAL at 08:38

## 2024-01-01 RX ADMIN — ASPIRIN 81 MG: 81 TABLET, COATED ORAL at 08:38

## 2024-01-01 RX ADMIN — GUAIFENESIN 600 MG: 600 TABLET, EXTENDED RELEASE ORAL at 22:24

## 2024-01-01 RX ADMIN — ATORVASTATIN CALCIUM 40 MG: 10 TABLET, FILM COATED ORAL at 08:38

## 2024-01-01 ASSESSMENT — PAIN SCALES - GENERAL
PAINLEVEL_OUTOF10: 0

## 2024-01-01 NOTE — PROGRESS NOTES
[facility]  Jamee Ryan MD  [phone]            Renal Progress Note    NAME:  Juarez Tse   :   1945   MRN:   [id number]     Date/Time:  2024 10:22 AM            DISCUSSION / PLAN :      Assessment:     GUERO probably due to urinary tract obstruction-also on NSAIDs  -UA with RBC and WBC. Hematuria Might be traumatic due to oro, can not rule out UTI    Urinary retention, oro removed yesterday.   BL hydronephrosis due to AYERS  COPD  Hypercapnic resp failure-improved  pulmonary HTN  HTN-BPs high  DM2  CAD  EF 55-60%, diastolic dysfunction  UTI?           Plan:  DC IVF  Monitor for urinary retention  Continue to hold lasix  C/w holding lisinopril  BP management- Resume amlodipine  Is and Os  No NSAIDs  F/u renal function test  Abx per primary team             ___________________________________________________  Subjective:       No new complaints, not SOB. Renal function continues to improve. Oro has been removed.       Medications reviewed:  Current Facility-Administered Medications   Medication Dose Route Frequency    gabapentin (NEURONTIN) tablet 300 mg  300 mg Oral BID    methylPREDNISolone sodium (PF) (SOLU-MEDROL PF) injection 60 mg  60 mg IntraVENous Q8H    arformoterol 15 mcg-budesonide 0.5 mg neb solution   Nebulization BID RT    0.9 % sodium chloride infusion   IntraVENous Continuous    enoxaparin Sodium (LOVENOX) injection 30 mg  30 mg SubCUTAneous Daily    sodium chloride flush 0.9 % injection 5-40 mL  5-40 mL IntraVENous PRN    0.9 % sodium chloride infusion   IntraVENous PRN    ondansetron (ZOFRAN-ODT) disintegrating tablet 4 mg  4 mg Oral Q8H PRN    Or    ondansetron (ZOFRAN) injection 4 mg  4 mg IntraVENous Q6H PRN    polyethylene glycol (GLYCOLAX) packet 17 g  17 g Oral Daily PRN    ipratropium 0.5 mg-albuterol 2.5 mg (DUONEB) nebulizer solution 1 Dose  1 Dose Inhalation Q4H WA RT    guaiFENesin (MUCINEX) extended release tablet 600 mg  600 mg Oral BID    insulin  lispro (HUMALOG) injection vial 0-4 Units  0-4 Units SubCUTAneous Q6H    insulin lispro (HUMALOG) injection vial 0-4 Units  0-4 Units SubCUTAneous Nightly    glucose chewable tablet 16 g  4 tablet Oral PRN    dextrose bolus 10% 125 mL  125 mL IntraVENous PRN    Or    dextrose bolus 10% 250 mL  250 mL IntraVENous PRN    glucagon injection 1 mg  1 mg SubCUTAneous PRN    dextrose 10 % infusion   IntraVENous Continuous PRN    [Held by provider] furosemide (LASIX) injection 20 mg  20 mg IntraVENous BID    diazePAM (VALIUM) injection 2.5 mg  2.5 mg IntraVENous Once    [Held by provider] amLODIPine (NORVASC) tablet 5 mg  5 mg Oral Daily    aspirin EC tablet 81 mg  81 mg Oral Daily    atorvastatin (LIPITOR) tablet 40 mg  40 mg Oral Daily    DULoxetine (CYMBALTA) extended release capsule 60 mg  60 mg Oral Daily    metoprolol tartrate (LOPRESSOR) tablet 25 mg  25 mg Oral BID    pantoprazole (PROTONIX) tablet 40 mg  40 mg Oral QAM AC    traMADol (ULTRAM) tablet 50 mg  50 mg Oral BID PRN    vitamin B-12 (CYANOCOBALAMIN) tablet 1,000 mcg  1,000 mcg Oral Daily        Objective:   Vitals:  BP (!) 156/92   Pulse 80   Temp 97.3 °F (36.3 °C) (Oral)   Resp 20   Ht 1.753 m (5' 9\")   Wt 74.5 kg (164 lb 4.8 oz)   SpO2 97%   BMI 24.26 kg/m²   Temp (24hrs), Av.5 °F (36.4 °C), Min:97.3 °F (36.3 °C), Max:97.8 °F (36.6 °C)           Last 24hr Input/Output:    Intake/Output Summary (Last 24 hours) at 2024 1022  Last data filed at 2024 0646  Gross per 24 hour   Intake --   Output 2060 ml   Net -2060 ml        Physical Exam:  General:Alert, awake, No distress, on NC  HEENT: Eyes Conjunctiva without pallor ,erythema.  The sclerae without icterus. .   Neck:Supple,no JVD  Lungs : Clears to auscultation Bilaterally, Normal respiratory effort  CVS: RRR, S1 S2 normal, No rub, SM+  Abdomen: Soft, Non tender, Not distended, bowel sounds present  : oro  Extremities:  No Edema  Skin: No rash , not jaundiced  Neurologic: non focal,

## 2024-01-01 NOTE — CARE COORDINATION
Care Management Initial Assessment       RUR: 16%  Readmission? No  1st IM letter given? Yes     CM received call from RN about O2.  Family reported that pt had a concentrator at home but not portable tanks, so they were requesting transport home so pt could have O2.  It was then discovered that pt does not have concentrator at home, she has a nebulizer at home.  Per hospitalist, pt will need continuous Home O2.  CM received Home O2 orders and sent referral through [facility] to [facility].    CM met with pt and family at the bedside.  CM informed them that discharge would likely take place tomorrow (1/2/24), because pt has BCBS Medicare insurance and will need insurance auth for O2.  Family still requesting transport home?  CM will need to set up non-emergency stretcher transport.  Son, James, asked that he be called and he would make sure one of the family members would meet pt at her apartment to let her in.  Pt lives in a senior living apartment.  Family requested Home PT safety eval.  CM sent orders through [facility] to [facility] and [facility].  Family also asked about home aides, cm reviewed that it would be private pay for home aides, unless pt had Medicaid.  Family stated that pt has Medicaid, CM informed family that they would need to contact DSS and have them come out to the house to do an eval to see if patient qualified for home health aides (pt is independent with ADLs, and does not qualify for home health aides).    [facility] has accepted pt for Home O2 and wrote in [facility] that they would deliver tanks in the morning.   [facility] replied that they do not contract with pt's insurance anymore and can not accept.  Still waiting for response from [facility].       01/01/24 1528   Service Assessment   Patient Orientation Alert and Oriented   Cognition Alert   History Provided By Patient   Primary Caregiver Self   Support Systems Children;Family Members   Patient's Healthcare

## 2024-01-01 NOTE — PROGRESS NOTES
Lovenox Monitoring  Indication: DVT Prophylaxis  Recent Labs     12/30/23  0350 12/31/23  0239   HGB 14.8 15.1    251     Current Weight: 74.5 kg  Est. CrCl = 44 ml/min  Current Dose: 30 mg subcutaneously every 24 hours.  Plan: Change to 40 mg subcutaneously every 24 hours for improved renal function (Crcl > 30 ml/min)

## 2024-01-01 NOTE — PLAN OF CARE
Problem: Skin/Tissue Integrity  Goal: Absence of new skin breakdown  Description: 1.  Monitor for areas of redness and/or skin breakdown  2.  Assess vascular access sites hourly  3.  Every 4-6 hours minimum:  Change oxygen saturation probe site  4.  Every 4-6 hours:  If on nasal continuous positive airway pressure, respiratory therapy assess nares and determine need for appliance change or resting period.  Outcome: /[facility] Progressing     Problem: Discharge Planning  Goal: Discharge to home or other facility with appropriate resources  [facility]     Problem: Safety - Adult  Goal: Free from fall injury  Outcome: /[facility] Progressing

## 2024-01-01 NOTE — PROGRESS NOTES
held with appropriate clinical/nonclinical/ nursing providers based on care coordination needs.         Patients current active Medications were reviewed, considered, added and adjusted based on the clinical condition today.      Home Medications were reconciled to the best of my ability given all available resources at the time of admission. Route is PO if not otherwise noted.      Admission Status:[id number]:::1}      Medications Reviewed:     Current Facility-Administered Medications   Medication Dose Route Frequency    amLODIPine (NORVASC) tablet 5 mg  5 mg Oral Daily    ipratropium 0.5 mg-albuterol 2.5 mg (DUONEB) nebulizer solution 1 Dose  1 Dose Inhalation BID RT    gabapentin (NEURONTIN) tablet 300 mg  300 mg Oral BID    methylPREDNISolone sodium (PF) (SOLU-MEDROL PF) injection 60 mg  60 mg IntraVENous Q8H    arformoterol 15 mcg-budesonide 0.5 mg neb solution   Nebulization BID RT    enoxaparin Sodium (LOVENOX) injection 30 mg  30 mg SubCUTAneous Daily    sodium chloride flush 0.9 % injection 5-40 mL  5-40 mL IntraVENous PRN    0.9 % sodium chloride infusion   IntraVENous PRN    ondansetron (ZOFRAN-ODT) disintegrating tablet 4 mg  4 mg Oral Q8H PRN    Or    ondansetron (ZOFRAN) injection 4 mg  4 mg IntraVENous Q6H PRN    polyethylene glycol (GLYCOLAX) packet 17 g  17 g Oral Daily PRN    guaiFENesin (MUCINEX) extended release tablet 600 mg  600 mg Oral BID    insulin lispro (HUMALOG) injection vial 0-4 Units  0-4 Units SubCUTAneous Q6H    insulin lispro (HUMALOG) injection vial 0-4 Units  0-4 Units SubCUTAneous Nightly    glucose chewable tablet 16 g  4 tablet Oral PRN    dextrose bolus 10% 125 mL  125 mL IntraVENous PRN    Or    dextrose bolus 10% 250 mL  250 mL IntraVENous PRN    glucagon injection 1 mg  1 mg SubCUTAneous PRN    dextrose 10 % infusion   IntraVENous Continuous PRN    [Held by provider] furosemide (LASIX) injection 20 mg  20 mg IntraVENous BID    diazePAM (VALIUM) injection 2.5 mg  2.5 mg  IntraVENous Once    aspirin EC tablet 81 mg  81 mg Oral Daily    atorvastatin (LIPITOR) tablet 40 mg  40 mg Oral Daily    DULoxetine (CYMBALTA) extended release capsule 60 mg  60 mg Oral Daily    metoprolol tartrate (LOPRESSOR) tablet 25 mg  25 mg Oral BID    pantoprazole (PROTONIX) tablet 40 mg  40 mg Oral QAM AC    traMADol (ULTRAM) tablet 50 mg  50 mg Oral BID PRN    vitamin B-12 (CYANOCOBALAMIN) tablet 1,000 mcg  1,000 mcg Oral Daily     ______________________________________________________________________  EXPECTED LENGTH OF STAY: Unable to retrieve estimated LOS  ACTUAL LENGTH OF STAY:          4                 Lonny Padilla MD

## 2024-01-01 NOTE — PROGRESS NOTES
Pulse oximetry assessment   98% at rest on room air (if 88% or less, skip next steps)  88% while ambulating on room air  100% at rest on 3LPM  92% while ambulating on 3LPM

## 2024-01-02 LAB
ALBUMIN SERPL-MCNC: 2.8 G/DL (ref 3.5–5)
AMMONIA PLAS-SCNC: 13 UMOL/L
ANION GAP SERPL CALC-SCNC: 6 MMOL/L (ref 5–15)
BUN SERPL-MCNC: 33 MG/DL (ref 6–20)
BUN/CREAT SERPL: 31 (ref 12–20)
CALCIUM SERPL-MCNC: 8.6 MG/DL (ref 8.5–10.1)
CHLORIDE SERPL-SCNC: 107 MMOL/L (ref 97–108)
CO2 SERPL-SCNC: 27 MMOL/L (ref 21–32)
CREAT SERPL-MCNC: 1.06 MG/DL (ref 0.55–1.02)
GLUCOSE BLD STRIP.AUTO-MCNC: 118 MG/DL (ref 65–117)
GLUCOSE BLD STRIP.AUTO-MCNC: 131 MG/DL (ref 65–117)
GLUCOSE BLD STRIP.AUTO-MCNC: 141 MG/DL (ref 65–117)
GLUCOSE BLD STRIP.AUTO-MCNC: 154 MG/DL (ref 65–117)
GLUCOSE BLD STRIP.AUTO-MCNC: 170 MG/DL (ref 65–117)
GLUCOSE SERPL-MCNC: 201 MG/DL (ref 65–100)
PHOSPHATE SERPL-MCNC: 2.8 MG/DL (ref 2.6–4.7)
POTASSIUM SERPL-SCNC: 4.4 MMOL/L (ref 3.5–5.1)
SERVICE CMNT-IMP: ABNORMAL
SODIUM SERPL-SCNC: 140 MMOL/L (ref 136–145)

## 2024-01-02 PROCEDURE — 94760 N-INVAS EAR/PLS OXIMETRY 1: CPT

## 2024-01-02 PROCEDURE — 2060000000 HC ICU INTERMEDIATE R&B

## 2024-01-02 PROCEDURE — 2700000000 HC OXYGEN THERAPY PER DAY

## 2024-01-02 PROCEDURE — 6360000002 HC RX W HCPCS: Performed by: HOSPITALIST

## 2024-01-02 PROCEDURE — 94618 PULMONARY STRESS TESTING: CPT

## 2024-01-02 PROCEDURE — 6370000000 HC RX 637 (ALT 250 FOR IP): Performed by: INTERNAL MEDICINE

## 2024-01-02 PROCEDURE — 80069 RENAL FUNCTION PANEL: CPT

## 2024-01-02 PROCEDURE — 82140 ASSAY OF AMMONIA: CPT

## 2024-01-02 PROCEDURE — 82962 GLUCOSE BLOOD TEST: CPT

## 2024-01-02 PROCEDURE — 2580000003 HC RX 258: Performed by: HOSPITALIST

## 2024-01-02 PROCEDURE — 36415 COLL VENOUS BLD VENIPUNCTURE: CPT

## 2024-01-02 PROCEDURE — 94640 AIRWAY INHALATION TREATMENT: CPT

## 2024-01-02 PROCEDURE — 6370000000 HC RX 637 (ALT 250 FOR IP): Performed by: HOSPITALIST

## 2024-01-02 PROCEDURE — 97116 GAIT TRAINING THERAPY: CPT

## 2024-01-02 PROCEDURE — 97110 THERAPEUTIC EXERCISES: CPT

## 2024-01-02 PROCEDURE — 97530 THERAPEUTIC ACTIVITIES: CPT

## 2024-01-02 RX ORDER — AMLODIPINE BESYLATE 5 MG/1
10 TABLET ORAL DAILY
Status: DISCONTINUED | OUTPATIENT
Start: 2024-01-02 | End: 2024-01-06 | Stop reason: HOSPADM

## 2024-01-02 RX ADMIN — METOPROLOL TARTRATE 25 MG: 25 TABLET, FILM COATED ORAL at 22:16

## 2024-01-02 RX ADMIN — AMLODIPINE BESYLATE 10 MG: 5 TABLET ORAL at 10:32

## 2024-01-02 RX ADMIN — ARFORMOTEROL TARTRATE: 15 SOLUTION RESPIRATORY (INHALATION) at 21:16

## 2024-01-02 RX ADMIN — METHYLPREDNISOLONE SODIUM SUCCINATE 60 MG: 125 INJECTION, POWDER, FOR SOLUTION INTRAMUSCULAR; INTRAVENOUS at 05:27

## 2024-01-02 RX ADMIN — GABAPENTIN 300 MG: 600 TABLET, FILM COATED ORAL at 10:33

## 2024-01-02 RX ADMIN — GUAIFENESIN 600 MG: 600 TABLET, EXTENDED RELEASE ORAL at 22:15

## 2024-01-02 RX ADMIN — PANTOPRAZOLE SODIUM 40 MG: 40 TABLET, DELAYED RELEASE ORAL at 05:28

## 2024-01-02 RX ADMIN — DULOXETINE HYDROCHLORIDE 60 MG: 60 CAPSULE, DELAYED RELEASE ORAL at 10:32

## 2024-01-02 RX ADMIN — ATORVASTATIN CALCIUM 40 MG: 10 TABLET, FILM COATED ORAL at 10:33

## 2024-01-02 RX ADMIN — ENOXAPARIN SODIUM 40 MG: 100 INJECTION SUBCUTANEOUS at 10:33

## 2024-01-02 RX ADMIN — CYANOCOBALAMIN TAB 500 MCG 1000 MCG: 500 TAB at 10:32

## 2024-01-02 RX ADMIN — IPRATROPIUM BROMIDE AND ALBUTEROL SULFATE 1 DOSE: 2.5; .5 SOLUTION RESPIRATORY (INHALATION) at 07:06

## 2024-01-02 RX ADMIN — METOPROLOL TARTRATE 25 MG: 25 TABLET, FILM COATED ORAL at 10:33

## 2024-01-02 RX ADMIN — WATER 40 MG: 1 INJECTION INTRAMUSCULAR; INTRAVENOUS; SUBCUTANEOUS at 19:35

## 2024-01-02 RX ADMIN — ARFORMOTEROL TARTRATE: 15 SOLUTION RESPIRATORY (INHALATION) at 07:06

## 2024-01-02 RX ADMIN — GABAPENTIN 300 MG: 600 TABLET, FILM COATED ORAL at 22:15

## 2024-01-02 RX ADMIN — ASPIRIN 81 MG: 81 TABLET, COATED ORAL at 10:33

## 2024-01-02 RX ADMIN — GUAIFENESIN 600 MG: 600 TABLET, EXTENDED RELEASE ORAL at 10:33

## 2024-01-02 RX ADMIN — IPRATROPIUM BROMIDE AND ALBUTEROL SULFATE 1 DOSE: 2.5; .5 SOLUTION RESPIRATORY (INHALATION) at 21:16

## 2024-01-02 NOTE — PROGRESS NOTES
PCCM:    VSS    V/Q scan:  IMPRESSION:  1. Very low probability for pulmonary emboli.    ECHO:  Left Ventricle: Normal left ventricular systolic function with a visually estimated EF of 55 - 60%. Left ventricle size is normal. Normal wall thickness. Normal wall motion. Abnormal diastolic function.    Right Ventricle: Moderately reduced systolic function. TAPSE is abnormal. TAPSE is 1.2 cm.    Aortic Valve: Moderate stenosis of the aortic valve. AV mean gradient is 16 mmHg. AV peak gradient is 28 mmHg. AV peak velocity is 2.7 m/s. AV area by continuity VTI is 1.3 cm2. AV area by peak velocity is 1.2 cm2.    Mitral Valve: Thickened leaflet. Moderate annular calcification of the mitral valve. Mild regurgitation.    Tricuspid Valve: The estimated RVSP is 37 mmHg.    Aorta: Normal sized aortic root. Ao root diameter is 2.6 cm.    Image quality is fair.     Plan:   COPD, AE    Steroids for 10 days in total     Continue bronchodilators at discharge     O2 titration above 90%    Nehemiah Hernandez PA-C

## 2024-01-02 NOTE — PROGRESS NOTES
Pulse Oximetry Assessment    85% at rest on room air  90% at rest on 2 LPM  93% while ambulating on 2 LPM           6 MWT results: on 2 liters of 02  Vitals:    Pre HR 84 bpm    Pre SpO2 90%        Post HR 93 bpm    Post SpO2 93%    Distance ambulated: 48 ft in 2 minutes and 57 seconds and pt elected to sit down.    Assistive Device: rolling walker        Normative data:   Men 40-80 years old = 1889 feet; Women 40-80 years old=1620 feet  Modified 10 point David RPE scale utilized:  0 = no breathlessness at all ---> 10 = maximum exertion  Please refer to the flowsheet for any additional vital signs taken during this treatment.

## 2024-01-02 NOTE — PLAN OF CARE
Problem: Physical Therapy - Adult  Goal: By Discharge: Performs mobility at highest level of function for planned discharge setting.  See evaluation for individualized goals.  Description: FUNCTIONAL STATUS PRIOR TO ADMISSION: Patient was modified independent using a rollator for functional mobility.    HOME SUPPORT PRIOR TO ADMISSION: The patient lived alone.    Physical Therapy Goals  Initiated 12/29/2023  1.  Patient will move from supine to sit and sit to supine in bed with modified independence within 7 day(s).    2.  Patient will perform sit to stand with modified independence within 7 day(s).  3.  Patient will transfer from bed to chair and chair to bed with modified independence using the least restrictive device within 7 day(s).  4.  Patient will ambulate with modified independence for 150 feet with the least restrictive device within 7 day(s).       Outcome: Progressing   PHYSICAL THERAPY TREATMENT    Patient: Juarez Tse (78 y.o. female)  Date: 1/2/2024  Diagnosis: Pulmonary hypertension (HCC) [I27.20]  Acute respiratory failure with hypercapnia (HCC) [J96.02]  Acute on chronic respiratory failure with hypoxia and hypercapnia (HCC) [J96.21, J96.22] Acute on chronic respiratory failure with hypoxia and hypercapnia (HCC)      Precautions: Fall Risk, Contact Precautions, Bed Alarm (bed bug)                    ASSESSMENT:  Patient continues to benefit from skilled PT services and is progressing towards goals. Pt remains limited by impaired standing balance as well as impaired activity tolerance. She does not use supplemental 02 at baseline but today required 2 liters, see below. Pt required interventions today to maintain her safety with bed to bedside commode to void (reported urgent need). Afterwards she required a seated rest break and then participated in  six minute walk test, see below for the results. While pt has made gains since the eval, am concerned about her safely navigating her home  of need for assistance;Decreased awareness of need for safety  Insights: Decreased awareness of deficits  Initiation: Requires cues for some  Sequencing: Requires cues for some    Functional Mobility Training:  Bed Mobility:  Bed Mobility Training  Supine to Sit: Supervision (from bed modified)  Transfers:  Transfer Training  Interventions: Safety awareness training;Verbal cues  Stand to Sit: Setup  Bed to Chair: Contact-guard assistance  Balance:  Balance  Sitting: Intact  Standing: Impaired  Standing - Static: Constant support;Good  Standing - Dynamic: Constant support;Fair   Ambulation/Gait Training:     Gait  Overall Level of Assistance: Contact-guard assistance (occasional min assist and also assist for managment of the nasal cannula)  Distance (ft): 48 Feet  Assistive Device: Gait belt;Walker, rolling  Base of Support: Widened  Speed/Jacey: Slow  Step Length: Left shortened;Right shortened  Gait Abnormalities: Decreased step clearance  Neuro Re-Education:                    Pain Rating:  None rated   Pain Intervention(s):       Activity Tolerance:   desaturates with activity and requires oxygen and see assessment    After treatment:   Patient left in no apparent distress sitting up in chair, Call bell within reach, and Bed/ chair alarm activated      COMMUNICATION/EDUCATION:   The patient's plan of care was discussed with: occupational therapist and registered nurse    Patient Education  Education Provided: Role of Therapy;Plan of Care;Transfer Training;Fall Prevention Strategies  Education Provided Comments: pursed lip breathing  Education Method: Verbal  Barriers to Learning: None  Education Outcome: Continued education needed      BENOIT BOSCH, PT  Minutes: 33

## 2024-01-02 NOTE — PROGRESS NOTES
Transition of Care Plan:    3:02pm: SNF referrals pending with 1st choice: [facility], 2nd choice: [facility], 3rd choice: [facility]. CM asking for a few more choices.     12:01pm: SNF choices pending with sonJames. CM emailed ([email or web address]) [facility] list for choice. Insurance auth will be needed with [facility] Medicare. Transport TBD. Attending MD agrees with plan.    RUR: 16%  Prior Level of Functioning: Lives independently   Disposition: SNF  If SNF or IPR: Date FOC offered: 1/2   Date FOC received: Pending choices  Accepting facility: Pending   Date authorization started with reference number: Pending   Date authorization received and expires:   Follow up appointments: Per attending's recommendations   DME needed: none   Transportation at discharge: [facility] vs family   IM/IMM Medicare/ letter given:1st on 1/1.   Is patient a  and connected with [facility]? No    If yes, was [place] transfer form completed and [facility] notified?   Caregiver Contact: Son: James Chang: [phone]   Discharge Caregiver contacted prior to discharge?Y   Care Conference needed? No  Barriers to discharge: SNF placement and insurance auth.     Michaelle Del Cid RN/CRM  [phone]

## 2024-01-02 NOTE — PLAN OF CARE
Problem: Occupational Therapy - Adult  Goal: By Discharge: Performs self-care activities at highest level of function for planned discharge setting.  See evaluation for individualized goals.  Description: FUNCTIONAL STATUS PRIOR TO ADMISSION:     ,  ,  ,  ,  ,  ,  ,  ,  ,  ,       HOME SUPPORT: Patient lived alone with no local support at [facility]. She has 2 sons out of town who assist with transportation occasionally    Occupational Therapy Goals:  Initiated 12/29/2023  1.  Patient will perform grooming in standing with Supervision within 7 day(s).  2.  Patient will perform anterior neck to thigh bathing with Stand by Assist and Set-up within 7 day(s).  3.  Patient will perform lower body dressing with Minimal Assist within 7 day(s).  4.  Patient will perform toilet transfers with Stand by Assist  within 7 day(s).  5.  Patient will perform all aspects of toileting with Minimal Assist within 7 day(s).  6.  Patient will participate in upper extremity therapeutic exercise/activities with Stand by Assist for 5 minutes within 7 day(s).    7.  Patient will utilize energy conservation techniques during functional activities with verbal and visual cues within 7 day(s).    Outcome: Progressing   OCCUPATIONAL THERAPY TREATMENT  Patient: Juarez Tse (78 y.o. female)  Date: 1/2/2024  Primary Diagnosis: Pulmonary hypertension (HCC) [I27.20]  Acute respiratory failure with hypercapnia (HCC) [J96.02]  Acute on chronic respiratory failure with hypoxia and hypercapnia (HCC) [J96.21, J96.22]       Precautions: Fall Risk, Contact Precautions, Bed Alarm (bed bug)                Chart, occupational therapy assessment, plan of care, and goals were reviewed.    ASSESSMENT  Patient continues to benefit from skilled OT services and is slowly progressing towards goals, but remains limited by activity tolerance, cardiopulmonary endurance (SpO2 stable on 2L), lethargy, UE weakness, and cognition (processing, command following, insight,

## 2024-01-02 NOTE — PROGRESS NOTES
[facility]                                                                                          Hospitalist Progress Note  Cameron Gordon MD  Answering service: [phone] OR [phone] from in house phone        Date of Service:  2024  NAME:  Juarez Tse  :  1945  MRN:  [id number]       Admission Summary:   \"Juarez Tse is a 78 y.o. female with COPD/chronic hypoxemic respiratory failure on supplemental O2 prn, arthritis, HTN, and HLD who presented to [facility] ED by EMS with SOB and fatigue. EMS found her with SpO2 50% on RA. In the ED, SpO2 was 88% on RA. CT chest showed emphysema, bilateral lower lobe atx, cardiomegaly but no edema and chronic pulmonary hypertension. Rapid COVID/flu swab was negative. Pt was placed on BiPAP and transferred to [facility] ED under  service.\"        Interval history / Subjective:   Patient was sitting in a chair on oxygen via nasal cannula when I saw her this morning.  She has no complaints.  Discussing discharge plans, she says she has bed bags at her house will not be ready until end of the week or next week, and she is not sure if she can go with her son.  PT recommending SNF.     Assessment & Plan:     Acute on chronic hypoxic and hypercapnic respiratory failure  Chronic centrilobular emphysema/COPD  Pulmonary arterial hypertension, noted on CT chest  -Off BiPAP, on oxygen via nasal cannula.  2 L/min satting 95 to 96%.  - rapid COVID/flu swab negative, RVP negative  - troponin negative x2  -Continue bronchodilators, wean down Solu-Medrol switched to prednisone tomorrow, add inhaled rates.     GUERO creatinine ,resolved  -Obstructive, patient was found to have urinary retention and required Velásquez catheter.  Velásquez catheter removed on , patient voiding fine.  Creatinine significantly improved  -Appreciate Nephrology     Mildly elevated AST, trending down  - likely due to dehydration        Afebrile leucocytosis  RVP  patient and independently examined them on 1/2/2024 as outlined below:          General : Sitting by the bedside, on oxygen via nasal cannula.  Alert x 3, awake, no acute distress,   HEENT: PEERL, EOMI, moist mucus membrane, TM clear  Neck: supple, no JVD, no meningeal signs  Chest: On oxygen via nasal collar.  Clear to auscultation bilaterally   CVS: S1 S2 heard, Capillary refill less than 2 seconds  Abd: soft/ non tender, non distended, BS physiological,   Ext: no clubbing, no cyanosis, no edema, brisk 2+ DP pulses  Neuro/Psych: pleasant mood and affect, CN 2-12 grossly intact, sensory grossly within normal limit, Strength 5/5 in all extremities, DTR 1+ x 4  Skin: warm     Data Review:    Review and/or order of clinical lab test      I have personally and independently reviewed all pertinent labs, diagnostic studies, imaging, and have provided independent interpretation of the same.     Labs:     Recent Labs     12/31/23 0239   WBC 12.0*   HGB 15.1   HCT 49.6*          Recent Labs     12/31/23 0239 01/01/24  0320 01/02/24  0237     137 137 140   K 4.3  4.3 4.4 4.4     105 108 107   CO2 26  27 24 27   BUN 53*  52* 40* 33*   PHOS 3.7 2.1* 2.8       No results for input(s): \"ALT\", \"TP\", \"ALB\", \"GLOB\", \"GGT\", \"AML\" in the last 72 hours.    Invalid input(s): \"SGOT\", \"GPT\", \"AP\", \"TBIL\", \"TBILI\", \"AMYP\", \"LPSE\", \"HLPSE\"    No results for input(s): \"INR\", \"APTT\" in the last 72 hours.    Invalid input(s): \"PTP\"   No results for input(s): \"TIBC\", \"FERR\" in the last 72 hours.    Invalid input(s): \"FE\", \"PSAT\"   No results found for: \"FOL\", \"RBCF\"   No results for input(s): \"PH\", \"PCO2\", \"PO2\" in the last 72 hours.  No results for input(s): \"CPK\" in the last 72 hours.    Invalid input(s): \"CPKMB\", \"CKNDX\", \"TROIQ\"  No results found for: \"CHOL\", \"CHOLX\", \"CHLST\", \"CHOLV\", \"HDL\", \"HDLC\", \"LDL\", \"LDLC\", \"TGLX\", \"TRIGL\"  No results found for: \"GLUCPOC\"  [unfilled]    Notes reviewed from all

## 2024-01-02 NOTE — PROGRESS NOTES
[facility]  Jamee Ryan MD  [phone]            Renal Progress Note    NAME:  Juarez Tse   :   1945   MRN:   [id number]     Date/Time:  2024 1:47 PM            DISCUSSION / PLAN :      Assessment:     GUERO probably due to urinary tract obstruction-also on NSAIDs. GFR is now normal  -UA with RBC and WBC. Hematuria Might be traumatic due to oro, can not rule out UTI    Urinary retention, oro removed yesterday.   BL hydronephrosis due to AYERS  COPD  Hypercapnic resp failure-improved  pulmonary HTN  HTN-BPs high  DM2  CAD  EF 55-60%, diastolic dysfunction  UTI?           Plan:    Increased oral intake was encouraged  Monitor for urinary retention  Continue to hold lasix  C/w holding lisinopril  BP management- Resume amlodipine  Is and Os  No NSAIDs  F/u renal function test  Abx per primary team             ___________________________________________________  Subjective:       No new complaints, not SOB. Renal function continues to improve. Oro has been removed.   24 No new issues      Medications reviewed:  Current Facility-Administered Medications   Medication Dose Route Frequency    methylPREDNISolone sodium succ (SOLU-MEDROL) 40 mg in sterile water 1 mL injection  40 mg IntraVENous Q12H    amLODIPine (NORVASC) tablet 10 mg  10 mg Oral Daily    ipratropium 0.5 mg-albuterol 2.5 mg (DUONEB) nebulizer solution 1 Dose  1 Dose Inhalation BID RT    enoxaparin (LOVENOX) injection 40 mg  40 mg SubCUTAneous Daily    gabapentin (NEURONTIN) tablet 300 mg  300 mg Oral BID    arformoterol 15 mcg-budesonide 0.5 mg neb solution   Nebulization BID RT    sodium chloride flush 0.9 % injection 5-40 mL  5-40 mL IntraVENous PRN    0.9 % sodium chloride infusion   IntraVENous PRN    ondansetron (ZOFRAN-ODT) disintegrating tablet 4 mg  4 mg Oral Q8H PRN    Or    ondansetron (ZOFRAN) injection 4 mg  4 mg IntraVENous Q6H PRN    polyethylene glycol (GLYCOLAX) packet 17 g  17 g Oral Daily PRN

## 2024-01-03 LAB
ANION GAP SERPL CALC-SCNC: 3 MMOL/L (ref 5–15)
BASOPHILS # BLD: 0 K/UL (ref 0–0.1)
BASOPHILS NFR BLD: 0 % (ref 0–1)
BUN SERPL-MCNC: 39 MG/DL (ref 6–20)
BUN/CREAT SERPL: 36 (ref 12–20)
CALCIUM SERPL-MCNC: 9.4 MG/DL (ref 8.5–10.1)
CHLORIDE SERPL-SCNC: 106 MMOL/L (ref 97–108)
CO2 SERPL-SCNC: 29 MMOL/L (ref 21–32)
CREAT SERPL-MCNC: 1.08 MG/DL (ref 0.55–1.02)
DIFFERENTIAL METHOD BLD: ABNORMAL
EOSINOPHIL # BLD: 0 K/UL (ref 0–0.4)
EOSINOPHIL NFR BLD: 0 % (ref 0–7)
ERYTHROCYTE [DISTWIDTH] IN BLOOD BY AUTOMATED COUNT: 18.4 % (ref 11.5–14.5)
GLUCOSE BLD STRIP.AUTO-MCNC: 120 MG/DL (ref 65–117)
GLUCOSE BLD STRIP.AUTO-MCNC: 129 MG/DL (ref 65–117)
GLUCOSE BLD STRIP.AUTO-MCNC: 145 MG/DL (ref 65–117)
GLUCOSE BLD STRIP.AUTO-MCNC: 176 MG/DL (ref 65–117)
GLUCOSE BLD STRIP.AUTO-MCNC: 182 MG/DL (ref 65–117)
GLUCOSE SERPL-MCNC: 135 MG/DL (ref 65–100)
HCT VFR BLD AUTO: 53.3 % (ref 35–47)
HGB BLD-MCNC: 15.9 G/DL (ref 11.5–16)
IMM GRANULOCYTES # BLD AUTO: 0.1 K/UL (ref 0–0.04)
IMM GRANULOCYTES NFR BLD AUTO: 0 % (ref 0–0.5)
LYMPHOCYTES # BLD: 0.8 K/UL (ref 0.8–3.5)
LYMPHOCYTES NFR BLD: 7 % (ref 12–49)
MCH RBC QN AUTO: 25.1 PG (ref 26–34)
MCHC RBC AUTO-ENTMCNC: 29.8 G/DL (ref 30–36.5)
MCV RBC AUTO: 84.1 FL (ref 80–99)
MONOCYTES # BLD: 1.6 K/UL (ref 0–1)
MONOCYTES NFR BLD: 13 % (ref 5–13)
NEUTS SEG # BLD: 9.5 K/UL (ref 1.8–8)
NEUTS SEG NFR BLD: 80 % (ref 32–75)
NRBC # BLD: 0 K/UL (ref 0–0.01)
NRBC BLD-RTO: 0 PER 100 WBC
PLATELET # BLD AUTO: 280 K/UL (ref 150–400)
PMV BLD AUTO: 10.4 FL (ref 8.9–12.9)
POTASSIUM SERPL-SCNC: 4.5 MMOL/L (ref 3.5–5.1)
RBC # BLD AUTO: 6.34 M/UL (ref 3.8–5.2)
SERVICE CMNT-IMP: ABNORMAL
SODIUM SERPL-SCNC: 138 MMOL/L (ref 136–145)
WBC # BLD AUTO: 11.9 K/UL (ref 3.6–11)

## 2024-01-03 PROCEDURE — 85025 COMPLETE CBC W/AUTO DIFF WBC: CPT

## 2024-01-03 PROCEDURE — 2700000000 HC OXYGEN THERAPY PER DAY

## 2024-01-03 PROCEDURE — 2060000000 HC ICU INTERMEDIATE R&B

## 2024-01-03 PROCEDURE — 80048 BASIC METABOLIC PNL TOTAL CA: CPT

## 2024-01-03 PROCEDURE — 2580000003 HC RX 258: Performed by: HOSPITALIST

## 2024-01-03 PROCEDURE — 82962 GLUCOSE BLOOD TEST: CPT

## 2024-01-03 PROCEDURE — 94760 N-INVAS EAR/PLS OXIMETRY 1: CPT

## 2024-01-03 PROCEDURE — 6360000002 HC RX W HCPCS: Performed by: HOSPITALIST

## 2024-01-03 PROCEDURE — 97116 GAIT TRAINING THERAPY: CPT | Performed by: PHYSICAL THERAPIST

## 2024-01-03 PROCEDURE — 6370000000 HC RX 637 (ALT 250 FOR IP): Performed by: INTERNAL MEDICINE

## 2024-01-03 PROCEDURE — 94640 AIRWAY INHALATION TREATMENT: CPT

## 2024-01-03 PROCEDURE — 6370000000 HC RX 637 (ALT 250 FOR IP): Performed by: HOSPITALIST

## 2024-01-03 PROCEDURE — 36415 COLL VENOUS BLD VENIPUNCTURE: CPT

## 2024-01-03 PROCEDURE — 2580000003 HC RX 258: Performed by: INTERNAL MEDICINE

## 2024-01-03 RX ORDER — INSULIN LISPRO 100 [IU]/ML
0-4 INJECTION, SOLUTION INTRAVENOUS; SUBCUTANEOUS
Status: DISCONTINUED | OUTPATIENT
Start: 2024-01-03 | End: 2024-01-06 | Stop reason: HOSPADM

## 2024-01-03 RX ADMIN — WATER 40 MG: 1 INJECTION INTRAMUSCULAR; INTRAVENOUS; SUBCUTANEOUS at 18:59

## 2024-01-03 RX ADMIN — GUAIFENESIN 600 MG: 600 TABLET, EXTENDED RELEASE ORAL at 09:59

## 2024-01-03 RX ADMIN — SODIUM CHLORIDE, PRESERVATIVE FREE 10 ML: 5 INJECTION INTRAVENOUS at 21:14

## 2024-01-03 RX ADMIN — METOPROLOL TARTRATE 25 MG: 25 TABLET, FILM COATED ORAL at 21:14

## 2024-01-03 RX ADMIN — IPRATROPIUM BROMIDE AND ALBUTEROL SULFATE 1 DOSE: 2.5; .5 SOLUTION RESPIRATORY (INHALATION) at 07:42

## 2024-01-03 RX ADMIN — PANTOPRAZOLE SODIUM 40 MG: 40 TABLET, DELAYED RELEASE ORAL at 05:06

## 2024-01-03 RX ADMIN — DULOXETINE HYDROCHLORIDE 60 MG: 60 CAPSULE, DELAYED RELEASE ORAL at 09:59

## 2024-01-03 RX ADMIN — ARFORMOTEROL TARTRATE: 15 SOLUTION RESPIRATORY (INHALATION) at 22:00

## 2024-01-03 RX ADMIN — ENOXAPARIN SODIUM 40 MG: 100 INJECTION SUBCUTANEOUS at 09:59

## 2024-01-03 RX ADMIN — ASPIRIN 81 MG: 81 TABLET, COATED ORAL at 09:59

## 2024-01-03 RX ADMIN — GUAIFENESIN 600 MG: 600 TABLET, EXTENDED RELEASE ORAL at 21:14

## 2024-01-03 RX ADMIN — IPRATROPIUM BROMIDE AND ALBUTEROL SULFATE 1 DOSE: 2.5; .5 SOLUTION RESPIRATORY (INHALATION) at 22:01

## 2024-01-03 RX ADMIN — AMLODIPINE BESYLATE 10 MG: 5 TABLET ORAL at 09:59

## 2024-01-03 RX ADMIN — CYANOCOBALAMIN TAB 500 MCG 1000 MCG: 500 TAB at 09:58

## 2024-01-03 RX ADMIN — WATER 40 MG: 1 INJECTION INTRAMUSCULAR; INTRAVENOUS; SUBCUTANEOUS at 05:06

## 2024-01-03 RX ADMIN — METOPROLOL TARTRATE 25 MG: 25 TABLET, FILM COATED ORAL at 09:59

## 2024-01-03 RX ADMIN — GABAPENTIN 300 MG: 600 TABLET, FILM COATED ORAL at 21:13

## 2024-01-03 RX ADMIN — ARFORMOTEROL TARTRATE: 15 SOLUTION RESPIRATORY (INHALATION) at 07:42

## 2024-01-03 RX ADMIN — GABAPENTIN 300 MG: 600 TABLET, FILM COATED ORAL at 09:58

## 2024-01-03 RX ADMIN — ATORVASTATIN CALCIUM 40 MG: 10 TABLET, FILM COATED ORAL at 09:58

## 2024-01-03 NOTE — PROGRESS NOTES
by the bedside, on oxygen via nasal cannula.  Alert x 3, awake, no acute distress,   HEENT: PEERL, EOMI, moist mucus membrane, TM clear  Neck: supple, no JVD, no meningeal signs  Chest: On oxygen via nasal collar.  Clear to auscultation bilaterally   CVS: S1 S2 heard, Capillary refill less than 2 seconds  Abd: soft/ non tender, non distended, BS physiological,   Ext: no clubbing, no cyanosis, no edema, brisk 2+ DP pulses  Neuro/Psych: pleasant mood and affect, CN 2-12 grossly intact, sensory grossly within normal limit, Strength 5/5 in all extremities, DTR 1+ x 4  Skin: warm     Data Review:    Review and/or order of clinical lab test      I have personally and independently reviewed all pertinent labs, diagnostic studies, imaging, and have provided independent interpretation of the same.     Labs:     Recent Labs     01/03/24  1715   WBC 11.9*   HGB 15.9   HCT 53.3*          Recent Labs     01/01/24  0320 01/02/24  0237    140   K 4.4 4.4    107   CO2 24 27   BUN 40* 33*   PHOS 2.1* 2.8       No results for input(s): \"ALT\", \"TP\", \"ALB\", \"GLOB\", \"GGT\", \"AML\" in the last 72 hours.    Invalid input(s): \"SGOT\", \"GPT\", \"AP\", \"TBIL\", \"TBILI\", \"AMYP\", \"LPSE\", \"HLPSE\"    No results for input(s): \"INR\", \"APTT\" in the last 72 hours.    Invalid input(s): \"PTP\"   No results for input(s): \"TIBC\", \"FERR\" in the last 72 hours.    Invalid input(s): \"FE\", \"PSAT\"   No results found for: \"FOL\", \"RBCF\"   No results for input(s): \"PH\", \"PCO2\", \"PO2\" in the last 72 hours.  No results for input(s): \"CPK\" in the last 72 hours.    Invalid input(s): \"CPKMB\", \"CKNDX\", \"TROIQ\"  No results found for: \"CHOL\", \"CHOLX\", \"CHLST\", \"CHOLV\", \"HDL\", \"HDLC\", \"LDL\", \"LDLC\", \"TGLX\", \"TRIGL\"  No results found for: \"GLUCPOC\"  [unfilled]    Notes reviewed from all clinical/nonclinical/nursing services involved in patient's clinical care. Care coordination discussions were held with appropriate clinical/nonclinical/ nursing providers based on

## 2024-01-03 NOTE — PLAN OF CARE
Problem: Physical Therapy - Adult  Goal: By Discharge: Performs mobility at highest level of function for planned discharge setting.  See evaluation for individualized goals.  Description: FUNCTIONAL STATUS PRIOR TO ADMISSION: Patient was modified independent using a rollator for functional mobility.    HOME SUPPORT PRIOR TO ADMISSION: The patient lived alone.    Physical Therapy Goals  Initiated 12/29/2023  1.  Patient will move from supine to sit and sit to supine in bed with modified independence within 7 day(s).    2.  Patient will perform sit to stand with modified independence within 7 day(s).  3.  Patient will transfer from bed to chair and chair to bed with modified independence using the least restrictive device within 7 day(s).  4.  Patient will ambulate with modified independence for 150 feet with the least restrictive device within 7 day(s).       Outcome: Progressing  PHYSICAL THERAPY TREATMENT    Patient: Juarez Tse (78 y.o. female)  Date: 1/3/2024  Diagnosis: Pulmonary hypertension (HCC) [I27.20]  Acute respiratory failure with hypercapnia (HCC) [J96.02]  Acute on chronic respiratory failure with hypoxia and hypercapnia (HCC) [J96.21, J96.22] Acute on chronic respiratory failure with hypoxia and hypercapnia (HCC)      Precautions: Fall Risk, Contact Precautions, Bed Alarm (bed bug)                    ASSESSMENT:  Patient continues to benefit from skilled PT services and is progressing towards goals. The patient is sitting EOB on arrival.  She is able to transfer independently to the BSC which is positioned next to the bed.  After using the BSC she returned to the EOB and then ambulated 100 feet slowly with a RW and one standing rest break.  At baseline she uses a rollator and ambulates forward flexed with her forearms resting on the rollator hand  so using a RW caused increased back fatigue.  Her PO2 on 2L with ambulation is 93%.  She is cooperative and motivated and to go to a SNF until

## 2024-01-03 NOTE — PLAN OF CARE
Problem: Skin/Tissue Integrity  Goal: Absence of new skin breakdown  Description: 1.  Monitor for areas of redness and/or skin breakdown  2.  Assess vascular access sites hourly  3.  Every 4-6 hours minimum:  Change oxygen saturation probe site  4.  Every 4-6 hours:  If on nasal continuous positive airway pressure, respiratory therapy assess nares and determine need for appliance change or resting period.  Outcome: Progressing     Problem: Discharge Planning  Goal: Discharge to home or other facility with appropriate resources  Flowsheets  Taken 1/2/2024 2113  Discharge to home or other facility with appropriate resources: Identify barriers to discharge with patient and caregiver  Taken 1/2/2024 1031  Discharge to home or other facility with appropriate resources: Identify barriers to discharge with patient and caregiver     Problem: Safety - Adult  Goal: Free from fall injury  Flowsheets (Taken 1/2/2024 2113)  Free From Fall Injury: Instruct family/caregiver on patient safety

## 2024-01-03 NOTE — PROGRESS NOTES
Pulmonary, Critical Care, and Sleep Medicine~Progress Note    Name: Juarez Tse MRN: [id number]   : 1945 Hospital: [facility]   Date: 1/3/2024 12:47 PM Admission: 2023     Impression Plan   Acute hypoxemic and hypercarbic respiratory failure.  COPD exacerbation.  Nicotine dependence.  Pulmonary hypertension Move to PO steroids   Bronchodilators.  NIV qhs while in hospital   O2 titration above 90%   Follow up with Dr Mcguire in 1-2 wks   We will be available again to see if needed      Pulmonary Consultation:    1/3  Up walking around       VSS     V/Q scan:  IMPRESSION:  1. Very low probability for pulmonary emboli.     ECHO:  Left Ventricle: Normal left ventricular systolic function with a visually estimated EF of 55 - 60%. Left ventricle size is normal. Normal wall thickness. Normal wall motion. Abnormal diastolic function.    Right Ventricle: Moderately reduced systolic function. TAPSE is abnormal. TAPSE is 1.2 cm.    Aortic Valve: Moderate stenosis of the aortic valve. AV mean gradient is 16 mmHg. AV peak gradient is 28 mmHg. AV peak velocity is 2.7 m/s. AV area by continuity VTI is 1.3 cm2. AV area by peak velocity is 1.2 cm2.    Mitral Valve: Thickened leaflet. Moderate annular calcification of the mitral valve. Mild regurgitation.    Tricuspid Valve: The estimated RVSP is 37 mmHg.    Aorta: Normal sized aortic root. Ao root diameter is 2.6 cm.    Image quality is fair.         Abg looks ok       78-year-old female with past medical history of hypertension, hyperlipidemia, COPD, CAD and arthritis who was transferred from [facility] for acute hypercarbic respiratory failure.  Patient presented to [facility] with complaints of lethargy and increased shortness of breath.  Initial ABG showed a pCO2 of more than 80 mmHg.  She was placed on BiPAP and subsequently transferred.    Patient is a poor historian.    Patient described to me  History     Tobacco Use    Smoking status: Every Day     Current packs/day: 0.25     Types: Cigarettes    Smokeless tobacco: Never   Substance Use Topics    Alcohol use: No      Family History   Problem Relation Age of Onset    Cancer Paternal Grandfather     Lung Disease Father         Lung cancer    Diabetes Maternal Grandmother     Heart Disease Maternal Grandfather     Rheum Arthritis Paternal Grandmother     Diabetes Mother      OBJECTIVE:     Vital Signs:     BP (!) 160/95   Pulse 70   Temp 98.3 °F (36.8 °C) (Oral)   Resp 16   Ht 1.753 m (5' 9\")   Wt 78.3 kg (172 lb 9.9 oz)   SpO2 96%   BMI 25.49 kg/m²    Temp (24hrs), Av.6 °F (36.4 °C), Min:97.4 °F (36.3 °C), Max:98.3 °F (36.8 °C)     Intake/Output:     Last shift: No intake/output data recorded.    Last 3 shifts: No intake/output data recorded.      No intake or output data in the 24 hours ending 24 1247      Physical Exam:                                        Exam Findings Other   General: No resp distress noted, appears stated age    HEENT:  No ulcers, JVD not elevated, no cervical LAD    Chest: No pectus deformity, normal chest rise b/l    HEART:  RRR, no murmurs/rubs/gallops    Lungs:  CTA b/l, no rhonchi/crackles/wheeze, diminished BS at bases    ABD: Soft/NT, non rigid mildly distended    EXT: No cyanosis/clubbing/edema, normal peripheral pulses    Skin: No rashes or ulcers, no mottling    Neuro: A/O x 3        Medications:  Current Facility-Administered Medications   Medication Dose Route Frequency    insulin lispro (HUMALOG) injection vial 0-4 Units  0-4 Units SubCUTAneous 4x Daily AC & HS    methylPREDNISolone sodium succ (SOLU-MEDROL) 40 mg in sterile water 1 mL injection  40 mg IntraVENous Q12H    amLODIPine (NORVASC) tablet 10 mg  10 mg Oral Daily    ipratropium 0.5 mg-albuterol 2.5 mg (DUONEB) nebulizer solution 1 Dose  1 Dose Inhalation BID RT    enoxaparin (LOVENOX) injection 40 mg  40 mg SubCUTAneous Daily    gabapentin

## 2024-01-04 ENCOUNTER — HOME HEALTH ADMISSION (OUTPATIENT)
Dept: HOME HEALTH SERVICES | Facility: HOME HEALTH | Age: 79
End: 2024-01-04

## 2024-01-04 LAB
ALBUMIN SERPL-MCNC: 2.8 G/DL (ref 3.5–5)
ANION GAP SERPL CALC-SCNC: 7 MMOL/L (ref 5–15)
BUN SERPL-MCNC: 38 MG/DL (ref 6–20)
BUN/CREAT SERPL: 34 (ref 12–20)
CALCIUM SERPL-MCNC: 8.6 MG/DL (ref 8.5–10.1)
CHLORIDE SERPL-SCNC: 106 MMOL/L (ref 97–108)
CO2 SERPL-SCNC: 23 MMOL/L (ref 21–32)
CREAT SERPL-MCNC: 1.12 MG/DL (ref 0.55–1.02)
ERYTHROCYTE [DISTWIDTH] IN BLOOD BY AUTOMATED COUNT: 18.8 % (ref 11.5–14.5)
GLUCOSE BLD STRIP.AUTO-MCNC: 136 MG/DL (ref 65–117)
GLUCOSE BLD STRIP.AUTO-MCNC: 141 MG/DL (ref 65–117)
GLUCOSE BLD STRIP.AUTO-MCNC: 142 MG/DL (ref 65–117)
GLUCOSE BLD STRIP.AUTO-MCNC: 171 MG/DL (ref 65–117)
GLUCOSE SERPL-MCNC: 154 MG/DL (ref 65–100)
HCT VFR BLD AUTO: 51.1 % (ref 35–47)
HGB BLD-MCNC: 15.9 G/DL (ref 11.5–16)
MCH RBC QN AUTO: 25.9 PG (ref 26–34)
MCHC RBC AUTO-ENTMCNC: 31.1 G/DL (ref 30–36.5)
MCV RBC AUTO: 83.1 FL (ref 80–99)
NRBC # BLD: 0 K/UL (ref 0–0.01)
NRBC BLD-RTO: 0 PER 100 WBC
PHOSPHATE SERPL-MCNC: 4.2 MG/DL (ref 2.6–4.7)
PLATELET # BLD AUTO: 268 K/UL (ref 150–400)
PMV BLD AUTO: 10.9 FL (ref 8.9–12.9)
POTASSIUM SERPL-SCNC: 5 MMOL/L (ref 3.5–5.1)
RBC # BLD AUTO: 6.15 M/UL (ref 3.8–5.2)
SERVICE CMNT-IMP: ABNORMAL
SODIUM SERPL-SCNC: 136 MMOL/L (ref 136–145)
WBC # BLD AUTO: 10.8 K/UL (ref 3.6–11)

## 2024-01-04 PROCEDURE — 6360000002 HC RX W HCPCS: Performed by: HOSPITALIST

## 2024-01-04 PROCEDURE — 2580000003 HC RX 258: Performed by: HOSPITALIST

## 2024-01-04 PROCEDURE — 97530 THERAPEUTIC ACTIVITIES: CPT

## 2024-01-04 PROCEDURE — 80069 RENAL FUNCTION PANEL: CPT

## 2024-01-04 PROCEDURE — 94660 CPAP INITIATION&MGMT: CPT

## 2024-01-04 PROCEDURE — 2700000000 HC OXYGEN THERAPY PER DAY

## 2024-01-04 PROCEDURE — 2580000003 HC RX 258: Performed by: INTERNAL MEDICINE

## 2024-01-04 PROCEDURE — 97535 SELF CARE MNGMENT TRAINING: CPT

## 2024-01-04 PROCEDURE — 6370000000 HC RX 637 (ALT 250 FOR IP): Performed by: INTERNAL MEDICINE

## 2024-01-04 PROCEDURE — 2060000000 HC ICU INTERMEDIATE R&B

## 2024-01-04 PROCEDURE — 85027 COMPLETE CBC AUTOMATED: CPT

## 2024-01-04 PROCEDURE — 6370000000 HC RX 637 (ALT 250 FOR IP): Performed by: HOSPITALIST

## 2024-01-04 PROCEDURE — 36415 COLL VENOUS BLD VENIPUNCTURE: CPT

## 2024-01-04 PROCEDURE — 82962 GLUCOSE BLOOD TEST: CPT

## 2024-01-04 PROCEDURE — 97116 GAIT TRAINING THERAPY: CPT

## 2024-01-04 PROCEDURE — 94760 N-INVAS EAR/PLS OXIMETRY 1: CPT

## 2024-01-04 PROCEDURE — 94640 AIRWAY INHALATION TREATMENT: CPT

## 2024-01-04 RX ORDER — PREDNISONE 20 MG/1
40 TABLET ORAL DAILY
Status: DISCONTINUED | OUTPATIENT
Start: 2024-01-04 | End: 2024-01-06 | Stop reason: HOSPADM

## 2024-01-04 RX ADMIN — AMLODIPINE BESYLATE 10 MG: 5 TABLET ORAL at 09:07

## 2024-01-04 RX ADMIN — GUAIFENESIN 600 MG: 600 TABLET, EXTENDED RELEASE ORAL at 20:51

## 2024-01-04 RX ADMIN — WATER 40 MG: 1 INJECTION INTRAMUSCULAR; INTRAVENOUS; SUBCUTANEOUS at 06:34

## 2024-01-04 RX ADMIN — GUAIFENESIN 600 MG: 600 TABLET, EXTENDED RELEASE ORAL at 09:07

## 2024-01-04 RX ADMIN — PREDNISONE 40 MG: 20 TABLET ORAL at 09:07

## 2024-01-04 RX ADMIN — GABAPENTIN 300 MG: 600 TABLET, FILM COATED ORAL at 09:07

## 2024-01-04 RX ADMIN — ATORVASTATIN CALCIUM 40 MG: 10 TABLET, FILM COATED ORAL at 09:07

## 2024-01-04 RX ADMIN — SODIUM CHLORIDE, PRESERVATIVE FREE 10 ML: 5 INJECTION INTRAVENOUS at 20:53

## 2024-01-04 RX ADMIN — CYANOCOBALAMIN TAB 500 MCG 1000 MCG: 500 TAB at 09:07

## 2024-01-04 RX ADMIN — ARFORMOTEROL TARTRATE: 15 SOLUTION RESPIRATORY (INHALATION) at 08:26

## 2024-01-04 RX ADMIN — DULOXETINE HYDROCHLORIDE 60 MG: 60 CAPSULE, DELAYED RELEASE ORAL at 09:07

## 2024-01-04 RX ADMIN — ENOXAPARIN SODIUM 40 MG: 100 INJECTION SUBCUTANEOUS at 09:08

## 2024-01-04 RX ADMIN — IPRATROPIUM BROMIDE AND ALBUTEROL SULFATE 1 DOSE: 2.5; .5 SOLUTION RESPIRATORY (INHALATION) at 08:26

## 2024-01-04 RX ADMIN — PANTOPRAZOLE SODIUM 40 MG: 40 TABLET, DELAYED RELEASE ORAL at 06:34

## 2024-01-04 RX ADMIN — GABAPENTIN 300 MG: 600 TABLET, FILM COATED ORAL at 20:51

## 2024-01-04 RX ADMIN — METOPROLOL TARTRATE 25 MG: 25 TABLET, FILM COATED ORAL at 20:51

## 2024-01-04 RX ADMIN — IPRATROPIUM BROMIDE AND ALBUTEROL SULFATE 1 DOSE: 2.5; .5 SOLUTION RESPIRATORY (INHALATION) at 20:23

## 2024-01-04 RX ADMIN — ARFORMOTEROL TARTRATE: 15 SOLUTION RESPIRATORY (INHALATION) at 20:23

## 2024-01-04 RX ADMIN — METOPROLOL TARTRATE 25 MG: 25 TABLET, FILM COATED ORAL at 09:07

## 2024-01-04 RX ADMIN — ASPIRIN 81 MG: 81 TABLET, COATED ORAL at 09:07

## 2024-01-04 NOTE — PLAN OF CARE
2000 Received patient from KEELY Fox.    3917 Bedside shift change report given to KEELY Fox (oncoming nurse) by Sammie Lemus RN. Report included the following information SBAR, Kardex, MAR, Recent Results, and Cardiac Rhythm NSR.     Problem: Skin/Tissue Integrity  Goal: Absence of new skin breakdown  Description: 1.  Monitor for areas of redness and/or skin breakdown  2.  Assess vascular access sites hourly  3.  Every 4-6 hours minimum:  Change oxygen saturation probe site  4.  Every 4-6 hours:  If on nasal continuous positive airway pressure, respiratory therapy assess nares and determine need for appliance change or resting period.  Outcome: Progressing     Problem: Discharge Planning  Goal: Discharge to home or other facility with appropriate resources  Outcome: Progressing     Problem: Safety - Adult  Goal: Free from fall injury  Outcome: Progressing

## 2024-01-04 NOTE — PROGRESS NOTES
[facility]                                                                                          Hospitalist Progress Note  Amelia Malik MD  Answering service: [phone] OR [phone] from in house phone        Date of Service:  2024  NAME:  Juarez Tse  :  1945  MRN:  [id number]       Admission Summary:   \"Juarez Tse is a 78 y.o. female with COPD/chronic hypoxemic respiratory failure on supplemental O2 prn, arthritis, HTN, and HLD who presented to [facility] ED by EMS with SOB and fatigue. EMS found her with SpO2 50% on RA. In the ED, SpO2 was 88% on RA. CT chest showed emphysema, bilateral lower lobe atx, cardiomegaly but no edema and chronic pulmonary hypertension. Rapid COVID/flu swab was negative. Pt was placed on BiPAP and transferred to [facility] ED under  service.\"        Interval history / Subjective:   No new complaints, states she's breathing ok, denies pain, n/v/d.     Assessment & Plan:     Acute on chronic hypoxic and hypercapnic respiratory failure  Chronic centrilobular emphysema/COPD  Pulmonary arterial hypertension, noted on CT chest  -Off BiPAP, on oxygen via nasal cannula.  2 L/min satting 95 to 96%.  - rapid COVID/flu swab negative, RVP negative  - troponin negative x2  -Continue bronchodilators, wean down Solu-Medrol switched to prednisone tomorrow, add inhaled rates.     GUERO creatinine ,resolved  -Obstructive, patient was found to have urinary retention and required Velásquez catheter.  Velásquez catheter removed on , patient voiding fine.  Creatinine significantly improved  -Appreciate Nephrology     Mildly elevated AST, trending down  - likely due to dehydration        Afebrile leucocytosis  RVP negative.  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  - noted on XR L knee  - continue home pain meds     CAD  HTN  HLD  - resume home meds     DIET: regular diet  ISOLATION  Frequency    predniSONE (DELTASONE) tablet 40 mg  40 mg Oral Daily    insulin lispro (HUMALOG) injection vial 0-4 Units  0-4 Units SubCUTAneous 4x Daily AC & HS    amLODIPine (NORVASC) tablet 10 mg  10 mg Oral Daily    ipratropium 0.5 mg-albuterol 2.5 mg (DUONEB) nebulizer solution 1 Dose  1 Dose Inhalation BID RT    enoxaparin (LOVENOX) injection 40 mg  40 mg SubCUTAneous Daily    gabapentin (NEURONTIN) tablet 300 mg  300 mg Oral BID    arformoterol 15 mcg-budesonide 0.5 mg neb solution   Nebulization BID RT    sodium chloride flush 0.9 % injection 5-40 mL  5-40 mL IntraVENous PRN    0.9 % sodium chloride infusion   IntraVENous PRN    ondansetron (ZOFRAN-ODT) disintegrating tablet 4 mg  4 mg Oral Q8H PRN    Or    ondansetron (ZOFRAN) injection 4 mg  4 mg IntraVENous Q6H PRN    polyethylene glycol (GLYCOLAX) packet 17 g  17 g Oral Daily PRN    guaiFENesin (MUCINEX) extended release tablet 600 mg  600 mg Oral BID    insulin lispro (HUMALOG) injection vial 0-4 Units  0-4 Units SubCUTAneous Nightly    glucose chewable tablet 16 g  4 tablet Oral PRN    dextrose bolus 10% 125 mL  125 mL IntraVENous PRN    Or    dextrose bolus 10% 250 mL  250 mL IntraVENous PRN    glucagon injection 1 mg  1 mg SubCUTAneous PRN    dextrose 10 % infusion   IntraVENous Continuous PRN    aspirin EC tablet 81 mg  81 mg Oral Daily    atorvastatin (LIPITOR) tablet 40 mg  40 mg Oral Daily    DULoxetine (CYMBALTA) extended release capsule 60 mg  60 mg Oral Daily    metoprolol tartrate (LOPRESSOR) tablet 25 mg  25 mg Oral BID    pantoprazole (PROTONIX) tablet 40 mg  40 mg Oral QAM AC    traMADol (ULTRAM) tablet 50 mg  50 mg Oral BID PRN    vitamin B-12 (CYANOCOBALAMIN) tablet 1,000 mcg  1,000 mcg Oral Daily     ______________________________________________________________________  EXPECTED LENGTH OF STAY: Unable to retrieve estimated LOS  ACTUAL LENGTH OF STAY:          7                 Amelia Malik MD

## 2024-01-04 NOTE — CARE COORDINATION
Transition Of Care:     Auth pending with BCBS Medicare for [facility]. Auth started on 1/3. CM called Mame at [facility] today and auth is still pending. [facility] had also accepted. Original plan was home with home health and home o2 was ordered with [employer]. CM to call [employer] to return portable oxygen when patient discharges to SNF. Transport TBD.     Son:  James Chang: [phone] agrees with plan.    RUR: 17%  Prior Level of Functioning: Lives independently   Disposition: SNF  If SNF or IPR: Date FOC offered: 1/2   Date FOC received: 1/2  Accepting facility: [facility]  Date authorization started with reference number: Auth started on 1/3    Date authorization received and expires:Pending   Follow up appointments: Per attending's recommendations   DME needed: none   Transportation at discharge: [facility] vs family   IM/IMM Medicare/ letter given:1st on 1/1.   Is patient a [place] and connected with VA? No               If yes, was  transfer form completed and VA notified?   Caregiver Contact: Son: James Chang: [phone]   Discharge Caregiver contacted prior to discharge?Y   Care Conference needed? No  Barriers to discharge: SNF placement and insurance auth.      Michaelle Del Cid RN/CRM  [phone]

## 2024-01-04 NOTE — PLAN OF CARE
Problem: Physical Therapy - Adult  Goal: By Discharge: Performs mobility at highest level of function for planned discharge setting.  See evaluation for individualized goals.  Description: FUNCTIONAL STATUS PRIOR TO ADMISSION: Patient was modified independent using a rollator for functional mobility.    HOME SUPPORT PRIOR TO ADMISSION: The patient lived alone.    Physical Therapy Goals  Initiated 12/29/2023  1.  Patient will move from supine to sit and sit to supine in bed with modified independence within 7 day(s).    2.  Patient will perform sit to stand with modified independence within 7 day(s).  3.  Patient will transfer from bed to chair and chair to bed with modified independence using the least restrictive device within 7 day(s).  4.  Patient will ambulate with modified independence for 150 feet with the least restrictive device within 7 day(s).       Outcome: Progressing   PHYSICAL THERAPY TREATMENT    Patient: Juarez Tse (78 y.o. female)  Date: 1/4/2024  Diagnosis: Pulmonary hypertension (HCC) [I27.20]  Acute respiratory failure with hypercapnia (HCC) [J96.02]  Acute on chronic respiratory failure with hypoxia and hypercapnia (HCC) [J96.21, J96.22] Acute on chronic respiratory failure with hypoxia and hypercapnia (HCC)      Precautions: Fall Risk, Contact Precautions, Bed Alarm (bed bug)                    ASSESSMENT:  Patient continues to benefit from skilled PT services and is progressing towards goals. Challenged pt today with having her manage the nasal cannula line during amb. She required verbal cues and ongoing assist to safely manage it, otherwise she was getting tangled in the line while amb and turning around. Sp02 stable on 2 liters of 02 (lowest reading was 91%). Post session she remained up to the chair. Continue to recommend short term rehab, if pt were to discharge to home as she presents now, am concerned that she will trip on the nasal cannula line.         PLAN:  Patient  shortened;Right shortened  Gait Abnormalities: Decreased step clearance  Neuro Re-Education:                    Pain Rating:  None rated   Pain Intervention(s):       Activity Tolerance:   Fair to good on 2 liters of 02 see assessment    After treatment:   Patient left in no apparent distress sitting up in chair, Call bell within reach, and Bed/ chair alarm activated      COMMUNICATION/EDUCATION:   The patient's plan of care was discussed with: occupational therapist and registered nurse    Patient Education  Education Given To: Patient  Education Provided: Role of Therapy;Fall Prevention Strategies  Education Provided Comments: use of incentive spirometer  Education Method: Verbal  Barriers to Learning: None  Education Outcome: Continued education needed      BENOIT BOSCH, PT  Minutes: 27

## 2024-01-04 NOTE — PLAN OF CARE
Problem: Occupational Therapy - Adult  Goal: By Discharge: Performs self-care activities at highest level of function for planned discharge setting.  See evaluation for individualized goals.  Description: FUNCTIONAL STATUS PRIOR TO ADMISSION:    HOME SUPPORT: Patient lived alone with no local support at [facility]. She has 2 sons out of town who assist with transportation occasionally    Occupational Therapy Goals:  Initiated 12/29/2023  1.  Patient will perform grooming in standing with Supervision within 7 day(s).  2.  Patient will perform anterior neck to thigh bathing with Stand by Assist and Set-up within 7 day(s).  3.  Patient will perform lower body dressing with Minimal Assist within 7 day(s).  4.  Patient will perform toilet transfers with Stand by Assist  within 7 day(s).  5.  Patient will perform all aspects of toileting with Minimal Assist within 7 day(s).  6.  Patient will participate in upper extremity therapeutic exercise/activities with Stand by Assist for 5 minutes within 7 day(s).    7.  Patient will utilize energy conservation techniques during functional activities with verbal and visual cues within 7 day(s).    Outcome: Progressing     OCCUPATIONAL THERAPY TREATMENT  Patient: Juarez Tse (78 y.o. female)  Date: 1/4/2024  Primary Diagnosis: Pulmonary hypertension (HCC) [I27.20]  Acute respiratory failure with hypercapnia (HCC) [J96.02]  Acute on chronic respiratory failure with hypoxia and hypercapnia (HCC) [J96.21, J96.22]       Precautions: Fall Risk, Contact Precautions, Bed Alarm (bed bug)                Chart, occupational therapy assessment, plan of care, and goals were reviewed.    ASSESSMENT  Patient continues to benefit from skilled OT services and is progressing towards goals. Pt cleared for therapy by nursing and received sitting upright in chair agreeable to therapy. Pt overall supervision-SBA for functional mobility to bathroom for toileting and grooming tasks. Requires assistance  Rating:  None reported      Pain Intervention(s):   pain is at a level acceptable to the patient    Activity Tolerance:   Fair  and requires rest breaks  Please refer to the flowsheet for vital signs taken during this treatment.    After treatment:   Patient left in no apparent distress in bed, Call bell within reach, and Side rails x3    COMMUNICATION/EDUCATION:   The patient's plan of care was discussed with: physical therapist and registered nurse       Thank you for this referral.  Rose Funez OT  Minutes: 27

## 2024-01-05 LAB
GLUCOSE BLD STRIP.AUTO-MCNC: 131 MG/DL (ref 65–117)
GLUCOSE BLD STRIP.AUTO-MCNC: 142 MG/DL (ref 65–117)
GLUCOSE BLD STRIP.AUTO-MCNC: 144 MG/DL (ref 65–117)
GLUCOSE BLD STRIP.AUTO-MCNC: 156 MG/DL (ref 65–117)
GLUCOSE BLD STRIP.AUTO-MCNC: 159 MG/DL (ref 65–117)
SERVICE CMNT-IMP: ABNORMAL

## 2024-01-05 PROCEDURE — 2060000000 HC ICU INTERMEDIATE R&B

## 2024-01-05 PROCEDURE — 97530 THERAPEUTIC ACTIVITIES: CPT

## 2024-01-05 PROCEDURE — 6370000000 HC RX 637 (ALT 250 FOR IP): Performed by: INTERNAL MEDICINE

## 2024-01-05 PROCEDURE — 97116 GAIT TRAINING THERAPY: CPT

## 2024-01-05 PROCEDURE — 6360000002 HC RX W HCPCS: Performed by: HOSPITALIST

## 2024-01-05 PROCEDURE — 6370000000 HC RX 637 (ALT 250 FOR IP): Performed by: HOSPITALIST

## 2024-01-05 PROCEDURE — 2700000000 HC OXYGEN THERAPY PER DAY

## 2024-01-05 PROCEDURE — 94640 AIRWAY INHALATION TREATMENT: CPT

## 2024-01-05 PROCEDURE — 97535 SELF CARE MNGMENT TRAINING: CPT

## 2024-01-05 PROCEDURE — 82962 GLUCOSE BLOOD TEST: CPT

## 2024-01-05 RX ADMIN — ARFORMOTEROL TARTRATE: 15 SOLUTION RESPIRATORY (INHALATION) at 08:08

## 2024-01-05 RX ADMIN — METOPROLOL TARTRATE 25 MG: 25 TABLET, FILM COATED ORAL at 21:28

## 2024-01-05 RX ADMIN — PANTOPRAZOLE SODIUM 40 MG: 40 TABLET, DELAYED RELEASE ORAL at 07:26

## 2024-01-05 RX ADMIN — PREDNISONE 40 MG: 20 TABLET ORAL at 10:52

## 2024-01-05 RX ADMIN — METOPROLOL TARTRATE 25 MG: 25 TABLET, FILM COATED ORAL at 10:52

## 2024-01-05 RX ADMIN — DULOXETINE HYDROCHLORIDE 60 MG: 60 CAPSULE, DELAYED RELEASE ORAL at 10:53

## 2024-01-05 RX ADMIN — AMLODIPINE BESYLATE 10 MG: 5 TABLET ORAL at 10:53

## 2024-01-05 RX ADMIN — GUAIFENESIN 600 MG: 600 TABLET, EXTENDED RELEASE ORAL at 21:28

## 2024-01-05 RX ADMIN — ENOXAPARIN SODIUM 40 MG: 100 INJECTION SUBCUTANEOUS at 10:52

## 2024-01-05 RX ADMIN — ARFORMOTEROL TARTRATE: 15 SOLUTION RESPIRATORY (INHALATION) at 21:41

## 2024-01-05 RX ADMIN — GABAPENTIN 300 MG: 600 TABLET, FILM COATED ORAL at 10:53

## 2024-01-05 RX ADMIN — ATORVASTATIN CALCIUM 40 MG: 10 TABLET, FILM COATED ORAL at 10:53

## 2024-01-05 RX ADMIN — CYANOCOBALAMIN TAB 500 MCG 1000 MCG: 500 TAB at 10:52

## 2024-01-05 RX ADMIN — IPRATROPIUM BROMIDE AND ALBUTEROL SULFATE 1 DOSE: 2.5; .5 SOLUTION RESPIRATORY (INHALATION) at 08:09

## 2024-01-05 RX ADMIN — GABAPENTIN 300 MG: 600 TABLET, FILM COATED ORAL at 21:27

## 2024-01-05 RX ADMIN — GUAIFENESIN 600 MG: 600 TABLET, EXTENDED RELEASE ORAL at 10:53

## 2024-01-05 RX ADMIN — POLYETHYLENE GLYCOL 3350 17 G: 17 POWDER, FOR SOLUTION ORAL at 21:35

## 2024-01-05 RX ADMIN — IPRATROPIUM BROMIDE AND ALBUTEROL SULFATE 1 DOSE: 2.5; .5 SOLUTION RESPIRATORY (INHALATION) at 21:41

## 2024-01-05 RX ADMIN — ASPIRIN 81 MG: 81 TABLET, COATED ORAL at 10:52

## 2024-01-05 NOTE — PLAN OF CARE
Problem: Occupational Therapy - Adult  Goal: By Discharge: Performs self-care activities at highest level of function for planned discharge setting.  See evaluation for individualized goals.  Description:   HOME SUPPORT: Patient lived alone with no local support at [facility]. She has 2 sons out of town who assist with transportation occasionally    Occupational Therapy Goals:  Initiated 12/29/2023;continue goals 1/5/24  1.  Patient will perform grooming in standing with Supervision within 7 day(s).  2.  Patient will perform anterior neck to thigh bathing with Stand by Assist and Set-up within 7 day(s).  3.  Patient will perform lower body dressing with Minimal Assist within 7 day(s).  4.  Patient will perform toilet transfers with Stand by Assist  within 7 day(s).  5.  Patient will perform all aspects of toileting with Minimal Assist within 7 day(s).  6.  Patient will participate in upper extremity therapeutic exercise/activities with Stand by Assist for 5 minutes within 7 day(s).    7.  Patient will utilize energy conservation techniques during functional activities with verbal and visual cues within 7 day(s).    Outcome: Progressing   OCCUPATIONAL THERAPY TREATMENT: WEEKLY REASSESSMENT    Patient: Juarez Tse (78 y.o. female)  Date: 1/5/2024  Primary Diagnosis: Pulmonary hypertension (HCC) [I27.20]  Acute respiratory failure with hypercapnia (HCC) [J96.02]  Acute on chronic respiratory failure with hypoxia and hypercapnia (HCC) [J96.21, J96.22]       Precautions: Fall Risk, Contact Precautions, Bed Alarm (bed bug)                Chart, occupational therapy assessment, plan of care, and goals were reviewed.    ASSESSMENT  Patient continues to benefit from skilled OT services and is progressing towards goals. Session focusing on energy conservation and LB dressing techniques on this date. Patient declined offered grooming tasks at sink/out of chair activity reporting increased fatigue follow PT session. Patient  educated on use of tailor sit method to don/doff socks and to apply lotions to BLEs. May benefit from practice with adaptive dressing equip in future sessions. Remained upright in chair at conclusion of session.Cont to recommend SNF as patient remains below her baseline LOF.         PLAN  Goals have been updated based on progression since last assessment.  Patient continues to benefit from skilled intervention to address the above impairments.    Recommendations and Planned Interventions:   self care training, therapeutic activities, functional mobility training, balance training, therapeutic exercise, endurance activities, patient education, home safety training, and family training/education    Frequency/Duration: OT Plan of Care: 5 times/week    Recommendation for discharge: (in order for the patient to meet his/her long term goals): Therapy up to 5 days/week in Skilled nursing facility    Other factors to consider for discharge: high risk for falls, not safe to be alone, and concern for safely navigating or managing the home environment    IF patient discharges home will need the following DME: continuing to assess with progress     SUBJECTIVE:   Patient stated “thank you for your help.”    OBJECTIVE DATA SUMMARY:   Cognitive/Behavioral Status:  Orientation  Orientation Level: Oriented X4  Cognition  Arousal/Alertness: Appropriate responses to stimuli  Following Commands: Follows one step commands consistently;Follows one step commands with increased time  Attention Span: Attends with cues to redirect  Safety Judgement: Decreased awareness of need for assistance;Decreased awareness of need for safety  Problem Solving: Decreased awareness of errors  Insights: Decreased awareness of deficits  Initiation: Requires cues for some  Sequencing: Requires cues for some    Functional Mobility and Transfers for ADLs:  Bed Mobility:        Transfers:   Transfer Training  Sit to Stand: Supervision  Stand to Sit:

## 2024-01-05 NOTE — PLAN OF CARE
Problem: Physical Therapy - Adult  Goal: By Discharge: Performs mobility at highest level of function for planned discharge setting.  See evaluation for individualized goals.  Description: FUNCTIONAL STATUS PRIOR TO ADMISSION: Patient was modified independent using a rollator for functional mobility.    HOME SUPPORT PRIOR TO ADMISSION: The patient lived alone.      Physical Therapy Goals  Revised 1/5/2024  1.  Patient will move from supine to sit and sit to supine , scoot up and down, and roll side to side in bed with independence within 7 day(s).  2.  Patient will transfer from bed to chair and chair to bed with modified independence using the least restrictive device within 7 day(s).  3.  Patient will perform sit to stand with modified independence within 7 day(s).  4.  Patient will ambulate with modified independence for 150 feet with the least restrictive device safely managing the nasal cannula (if supplemental 02 is required) within 7 day(s).            Physical Therapy Goals  Initiated 12/29/2023  1.  Patient will move from supine to sit and sit to supine in bed with modified independence within 7 day(s).    2.  Patient will perform sit to stand with modified independence within 7 day(s).  3.  Patient will transfer from bed to chair and chair to bed with modified independence using the least restrictive device within 7 day(s).  4.  Patient will ambulate with modified independence for 150 feet with the least restrictive device within 7 day(s).       Outcome: Progressing   PHYSICAL THERAPY TREATMENT: WEEKLY REASSESSMENT    Patient: Juarez Tse (78 y.o. female)  Date: 1/5/2024  Primary Diagnosis: Pulmonary hypertension (HCC) [I27.20]  Acute respiratory failure with hypercapnia (HCC) [J96.02]  Acute on chronic respiratory failure with hypoxia and hypercapnia (HCC) [J96.21, J96.22]       Precautions: Fall Risk, Contact Precautions, Bed Alarm (bed bug)                    ASSESSMENT :  Patient continues to

## 2024-01-05 NOTE — PLAN OF CARE
2000 Received patient from KEELY Fox.    0700 unable to get morning labs. Multiple attempts made    0730 Bedside shift change report given to KEELY Jaramillo (oncoming nurse) by Sammie Lemus RN. Report included the following information SBAR, Kardex, MAR, Recent Results, and Cardiac Rhythm NSR.     Problem: Occupational Therapy - Adult  Goal: By Discharge: Performs self-care activities at highest level of function for planned discharge setting.  See evaluation for individualized goals.  Description: FUNCTIONAL STATUS PRIOR TO ADMISSION:     ,  ,  ,  ,  ,  ,  ,  ,  ,  ,       HOME SUPPORT: Patient lived alone with no local support at [facility]. She has 2 sons out of town who assist with transportation occasionally    Occupational Therapy Goals:  Initiated 12/29/2023  1.  Patient will perform grooming in standing with Supervision within 7 day(s).  2.  Patient will perform anterior neck to thigh bathing with Stand by Assist and Set-up within 7 day(s).  3.  Patient will perform lower body dressing with Minimal Assist within 7 day(s).  4.  Patient will perform toilet transfers with Stand by Assist  within 7 day(s).  5.  Patient will perform all aspects of toileting with Minimal Assist within 7 day(s).  6.  Patient will participate in upper extremity therapeutic exercise/activities with Stand by Assist for 5 minutes within 7 day(s).    7.  Patient will utilize energy conservation techniques during functional activities with verbal and visual cues within 7 day(s).    1/4/2024 1209 by Rose Banks, OT  Outcome: Progressing     Problem: Skin/Tissue Integrity  Goal: Absence of new skin breakdown  Description: 1.  Monitor for areas of redness and/or skin breakdown  2.  Assess vascular access sites hourly  3.  Every 4-6 hours minimum:  Change oxygen saturation probe site  4.  Every 4-6 hours:  If on nasal continuous positive airway pressure, respiratory therapy assess nares and determine need for appliance change or resting

## 2024-01-05 NOTE — CARE COORDINATION
Transition Of Care:      Update 4:09pm: Dalton from [facility] ([phone]) called and has auth approval for admission and bed will be available on Saturday 1/6. CM to coordinate discharge with liaison. Dalton stated she would put info in notes in link.   3:04pm: Auth still pending with [employer] Medicare for [facility]. Auth started on 1/3. CM called Dalton at [facility] today and auth is still pending. [facility] had also accepted. Original plan was home with home health and home o2 was ordered with [employer]. CM to call [employer] ([phone]) to return portable oxygen when patient discharges to SNF. Transport likely [facility] vs family.      Son:  James Chang: [phone] agrees with plan.     RUR: 17%  Prior Level of Functioning: Lives independently   Disposition: SNF  If SNF or IPR: Date FOC offered: 1/2   Date FOC received: 1/2  Accepting facility: [facility]  Date authorization started with reference number: Auth started on 1/3    Date authorization received and expires:Pending   Follow up appointments: Per attending's recommendations   DME needed: none   Transportation at discharge: [facility] vs family   IM/IMM Medicare/ letter given:1st on 1/1.   Is patient a [place] and connected with [facility]? No   Caregiver Contact: Son: James Chang: [phone]   Discharge Caregiver contacted prior to discharge?Y   Care Conference needed? No  Barriers to discharge: SNF placement and insurance alondra.      Michaelle Del Cid RN/CRM  [phone]

## 2024-01-06 VITALS
RESPIRATION RATE: 20 BRPM | WEIGHT: 172.62 LBS | TEMPERATURE: 97.4 F | SYSTOLIC BLOOD PRESSURE: 144 MMHG | DIASTOLIC BLOOD PRESSURE: 85 MMHG | HEART RATE: 87 BPM | HEIGHT: 69 IN | OXYGEN SATURATION: 96 % | BODY MASS INDEX: 25.57 KG/M2

## 2024-01-06 PROBLEM — J96.22 ACUTE ON CHRONIC RESPIRATORY FAILURE WITH HYPOXIA AND HYPERCAPNIA (HCC): Status: RESOLVED | Noted: 2023-12-28 | Resolved: 2024-01-06

## 2024-01-06 PROBLEM — J96.21 ACUTE ON CHRONIC RESPIRATORY FAILURE WITH HYPOXIA AND HYPERCAPNIA (HCC): Status: RESOLVED | Noted: 2023-12-28 | Resolved: 2024-01-06

## 2024-01-06 LAB
GLUCOSE BLD STRIP.AUTO-MCNC: 132 MG/DL (ref 65–117)
GLUCOSE BLD STRIP.AUTO-MCNC: 137 MG/DL (ref 65–117)
SERVICE CMNT-IMP: ABNORMAL
SERVICE CMNT-IMP: ABNORMAL

## 2024-01-06 PROCEDURE — 6370000000 HC RX 637 (ALT 250 FOR IP): Performed by: INTERNAL MEDICINE

## 2024-01-06 PROCEDURE — 6370000000 HC RX 637 (ALT 250 FOR IP): Performed by: HOSPITALIST

## 2024-01-06 PROCEDURE — 6360000002 HC RX W HCPCS: Performed by: HOSPITALIST

## 2024-01-06 PROCEDURE — 82962 GLUCOSE BLOOD TEST: CPT

## 2024-01-06 PROCEDURE — 94640 AIRWAY INHALATION TREATMENT: CPT

## 2024-01-06 RX ORDER — AMLODIPINE BESYLATE 5 MG/1
10 TABLET ORAL DAILY
Qty: 30 TABLET | Refills: 3 | Status: SHIPPED
Start: 2024-01-06

## 2024-01-06 RX ADMIN — ASPIRIN 81 MG: 81 TABLET, COATED ORAL at 08:12

## 2024-01-06 RX ADMIN — ATORVASTATIN CALCIUM 40 MG: 10 TABLET, FILM COATED ORAL at 08:12

## 2024-01-06 RX ADMIN — GUAIFENESIN 600 MG: 600 TABLET, EXTENDED RELEASE ORAL at 08:11

## 2024-01-06 RX ADMIN — IPRATROPIUM BROMIDE AND ALBUTEROL SULFATE 1 DOSE: 2.5; .5 SOLUTION RESPIRATORY (INHALATION) at 07:20

## 2024-01-06 RX ADMIN — DULOXETINE HYDROCHLORIDE 60 MG: 60 CAPSULE, DELAYED RELEASE ORAL at 08:12

## 2024-01-06 RX ADMIN — AMLODIPINE BESYLATE 10 MG: 5 TABLET ORAL at 08:12

## 2024-01-06 RX ADMIN — PANTOPRAZOLE SODIUM 40 MG: 40 TABLET, DELAYED RELEASE ORAL at 07:19

## 2024-01-06 RX ADMIN — METOPROLOL TARTRATE 25 MG: 25 TABLET, FILM COATED ORAL at 08:12

## 2024-01-06 RX ADMIN — PREDNISONE 40 MG: 20 TABLET ORAL at 08:12

## 2024-01-06 RX ADMIN — ARFORMOTEROL TARTRATE: 15 SOLUTION RESPIRATORY (INHALATION) at 07:20

## 2024-01-06 RX ADMIN — GABAPENTIN 300 MG: 600 TABLET, FILM COATED ORAL at 08:12

## 2024-01-06 RX ADMIN — ENOXAPARIN SODIUM 40 MG: 100 INJECTION SUBCUTANEOUS at 08:13

## 2024-01-06 RX ADMIN — CYANOCOBALAMIN TAB 500 MCG 1000 MCG: 500 TAB at 08:12

## 2024-01-06 NOTE — PROGRESS NOTES
[facility]                                                                                          Hospitalist Progress Note  Amelia Malik MD  Answering service: [phone] OR [phone] from in house phone        Date of Service:  2024  NAME:  Juarez Tse  :  1945  MRN:  [id number]       Admission Summary:   \"Juarez Tse is a 78 y.o. female with COPD/chronic hypoxemic respiratory failure on supplemental O2 prn, arthritis, HTN, and HLD who presented to [facility] ED by EMS with SOB and fatigue. EMS found her with SpO2 50% on RA. In the ED, SpO2 was 88% on RA. CT chest showed emphysema, bilateral lower lobe atx, cardiomegaly but no edema and chronic pulmonary hypertension. Rapid COVID/flu swab was negative. Pt was placed on BiPAP and transferred to [facility] ED under  service.\"        Interval history / Subjective:   No new complaints, states she's breathing ok, denies pain, n/v/d.     Assessment & Plan:     Acute on chronic hypoxic and hypercapnic respiratory failure  Chronic centrilobular emphysema/COPD  Pulmonary arterial hypertension, noted on CT chest  -Off BiPAP, on oxygen via nasal cannula.  2 L/min satting 95 to 96%.  - rapid COVID/flu swab negative, RVP negative  - troponin negative x2  -Continue bronchodilators, wean down Solu-Medrol switched to prednisone tomorrow, add inhaled rates.     GUERO creatinine ,resolved  -Obstructive, patient was found to have urinary retention and required Velásquez catheter.  Velásquez catheter removed on , patient voiding fine.  Creatinine significantly improved  -Appreciate Nephrology     Mildly elevated AST, trending down  - likely due to dehydration        Afebrile leucocytosis  RVP negative.  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  - noted on XR L knee  - continue home pain meds     CAD  HTN  HLD  - resume home meds     DIET: regular diet  ISOLATION  injection vial 0-4 Units  0-4 Units SubCUTAneous 4x Daily AC & HS    amLODIPine (NORVASC) tablet 10 mg  10 mg Oral Daily    ipratropium 0.5 mg-albuterol 2.5 mg (DUONEB) nebulizer solution 1 Dose  1 Dose Inhalation BID RT    enoxaparin (LOVENOX) injection 40 mg  40 mg SubCUTAneous Daily    gabapentin (NEURONTIN) tablet 300 mg  300 mg Oral BID    arformoterol 15 mcg-budesonide 0.5 mg neb solution   Nebulization BID RT    sodium chloride flush 0.9 % injection 5-40 mL  5-40 mL IntraVENous PRN    0.9 % sodium chloride infusion   IntraVENous PRN    ondansetron (ZOFRAN-ODT) disintegrating tablet 4 mg  4 mg Oral Q8H PRN    Or    ondansetron (ZOFRAN) injection 4 mg  4 mg IntraVENous Q6H PRN    polyethylene glycol (GLYCOLAX) packet 17 g  17 g Oral Daily PRN    guaiFENesin (MUCINEX) extended release tablet 600 mg  600 mg Oral BID    insulin lispro (HUMALOG) injection vial 0-4 Units  0-4 Units SubCUTAneous Nightly    glucose chewable tablet 16 g  4 tablet Oral PRN    dextrose bolus 10% 125 mL  125 mL IntraVENous PRN    Or    dextrose bolus 10% 250 mL  250 mL IntraVENous PRN    glucagon injection 1 mg  1 mg SubCUTAneous PRN    dextrose 10 % infusion   IntraVENous Continuous PRN    aspirin EC tablet 81 mg  81 mg Oral Daily    atorvastatin (LIPITOR) tablet 40 mg  40 mg Oral Daily    DULoxetine (CYMBALTA) extended release capsule 60 mg  60 mg Oral Daily    metoprolol tartrate (LOPRESSOR) tablet 25 mg  25 mg Oral BID    pantoprazole (PROTONIX) tablet 40 mg  40 mg Oral QAM AC    traMADol (ULTRAM) tablet 50 mg  50 mg Oral BID PRN    vitamin B-12 (CYANOCOBALAMIN) tablet 1,000 mcg  1,000 mcg Oral Daily     ______________________________________________________________________  EXPECTED LENGTH OF STAY: Unable to retrieve estimated LOS  ACTUAL LENGTH OF STAY:          9                 Amelia Malik MD

## 2024-01-06 NOTE — DISCHARGE SUMMARY
Get Your Medications        Information about where to get these medications is not yet available    Ask your nurse or doctor about these medications  amLODIPine 5 MG tablet  arformoterol tartrate 15 MCG/2ML NEBU 15 mcg, budesonide 0.5 MG/2ML SUSP 500 mcg  guaiFENesin 600 MG extended release tablet  ipratropium 0.5 mg-albuterol 2.5 mg 0.5-2.5 (3) MG/3ML Soln nebulizer solution  predniSONE 20 MG tablet         PHYSICAL EXAMINATION AT DISCHARGE:    S: seen and examined, breathing at baseline, denies pain, n/v.     BP (!) 144/85   Pulse 76   Temp 97.4 °F (36.3 °C) (Oral)   Resp 20   Ht 1.753 m (5' 9\")   Wt 78.3 kg (172 lb 9.9 oz)   SpO2 96%   BMI 25.49 kg/m²     NAD  RRR  Lungs w/ minimal end-expiratory wheeze, normal work of breathing      CHRONIC MEDICAL DIAGNOSES:  Principal Problem (Resolved):    Acute on chronic respiratory failure with hypoxia and hypercapnia (HCC)  Active Problems:    * No active hospital problems. *        Greater than 31 minutes were spent with the patient on counseling and coordination of care    Signed:   Amelia Malik MD  1/6/2024  11:37 AM

## 2024-01-06 NOTE — PROGRESS NOTES
1545: Transfer report given to Judah Young at [facility]. [facility] scheduled to arrive within the next hour.     1617: Patient alert and oriented x4. VS stable. PIV removed. PCT assisted patient with getting dressed and packing belongings. Discharge instruction reviewed. Patient verbalized understanding; no questions or concerns. Patient folder handed to transport. Patient left floor via wheelchair with [facility]. Patient's son called and given update.

## 2024-01-06 NOTE — PLAN OF CARE
Problem: Skin/Tissue Integrity  Goal: Absence of new skin breakdown  Description: 1.  Monitor for areas of redness and/or skin breakdown  2.  Assess vascular access sites hourly  3.  Every 4-6 hours minimum:  Change oxygen saturation probe site  4.  Every 4-6 hours:  If on nasal continuous positive airway pressure, respiratory therapy assess nares and determine need for appliance change or resting period.  Outcome: Completed     Problem: Safety - Adult  Goal: Free from fall injury  Outcome: Completed

## 2024-01-06 NOTE — PROGRESS NOTES
[facility]  Jamee Ryan MD  [phone]            Renal Progress Note    NAME:  Juarez Tse   :   1945   MRN:   [id number]     Date/Time:  2024 8:31 AM            DISCUSSION / PLAN :      Assessment:     GUERO probably due to urinary tract obstruction-also on NSAIDs. GFR is now normal. Patient has been refusing labs for the last 3 days  -UA with RBC and WBC. Hematuria Might be traumatic due to oro, can not rule out UTI    Urinary retention, oro removed yesterday.   BL hydronephrosis due to AYERS  COPD  Hypercapnic resp failure-improved  pulmonary HTN  HTN-BPs high  DM2  CAD  EF 55-60%, diastolic dysfunction  UTI?           Plan:    Increased oral intake was encouraged  Monitor for urinary retention  Continue to hold lasix  C/w holding lisinopril  BP management- Resume amlodipine  Is and Os  No NSAIDs  F/u renal function test, if patient allows  Abx per primary team    Please call over the weekend if any questions. No further renal recommendations             ___________________________________________________  Subjective:       No new complaints, not SOB. Renal function continues to improve. Oro has been removed.   24 No new issues    23 Doing well, no complaints.      Medications reviewed:  Current Facility-Administered Medications   Medication Dose Route Frequency    predniSONE (DELTASONE) tablet 40 mg  40 mg Oral Daily    insulin lispro (HUMALOG) injection vial 0-4 Units  0-4 Units SubCUTAneous 4x Daily AC & HS    amLODIPine (NORVASC) tablet 10 mg  10 mg Oral Daily    ipratropium 0.5 mg-albuterol 2.5 mg (DUONEB) nebulizer solution 1 Dose  1 Dose Inhalation BID RT    enoxaparin (LOVENOX) injection 40 mg  40 mg SubCUTAneous Daily    gabapentin (NEURONTIN) tablet 300 mg  300 mg Oral BID    arformoterol 15 mcg-budesonide 0.5 mg neb solution   Nebulization BID RT    sodium chloride flush 0.9 % injection 5-40 mL  5-40 mL IntraVENous PRN    0.9 % sodium chloride infusion

## 2024-01-11 NOTE — CARE COORDINATION
Medicaid LTSS Screening submitted for processing on the [facility] portal.      Dominique Perez LCSW [facility]  Care Manager   [phone]